# Patient Record
Sex: MALE | Race: BLACK OR AFRICAN AMERICAN | Employment: OTHER | URBAN - METROPOLITAN AREA
[De-identification: names, ages, dates, MRNs, and addresses within clinical notes are randomized per-mention and may not be internally consistent; named-entity substitution may affect disease eponyms.]

---

## 2017-01-03 ENCOUNTER — HOSPITAL ENCOUNTER (EMERGENCY)
Age: 67
Discharge: HOME | End: 2017-01-03
Attending: EMERGENCY MEDICINE
Payer: MEDICAID

## 2017-01-03 VITALS
WEIGHT: 136 LBS | RESPIRATION RATE: 16 BRPM | SYSTOLIC BLOOD PRESSURE: 129 MMHG | DIASTOLIC BLOOD PRESSURE: 59 MMHG | HEIGHT: 66 IN | OXYGEN SATURATION: 98 % | BODY MASS INDEX: 21.86 KG/M2 | HEART RATE: 95 BPM

## 2017-01-03 DIAGNOSIS — R31.9 HEMATURIA: ICD-10-CM

## 2017-01-03 DIAGNOSIS — S37.30XA URETHRAL INJURY, INITIAL ENCOUNTER: Primary | ICD-10-CM

## 2017-01-03 LAB
ALBUMIN SERPL-MCNC: 3.5 G/DL (ref 3.9–4.9)
ALP BLD-CCNC: 133 U/L (ref 35–104)
ALT SERPL-CCNC: 14 U/L (ref 0–41)
ANION GAP SERPL CALCULATED.3IONS-SCNC: 13 MEQ/L (ref 7–13)
AST SERPL-CCNC: 19 U/L (ref 0–40)
BACTERIA: ABNORMAL /HPF
BASOPHILS ABSOLUTE: 0 K/UL (ref 0–0.2)
BASOPHILS RELATIVE PERCENT: 0.3 %
BILIRUB SERPL-MCNC: 0.2 MG/DL (ref 0–1.2)
BILIRUBIN URINE: NEGATIVE
BLOOD, URINE: ABNORMAL
BUN BLDV-MCNC: 16 MG/DL (ref 8–23)
CALCIUM SERPL-MCNC: 9.4 MG/DL (ref 8.6–10.2)
CHLORIDE BLD-SCNC: 99 MEQ/L (ref 98–107)
CLARITY: ABNORMAL
CO2: 27 MEQ/L (ref 22–29)
COLOR: ABNORMAL
CREAT SERPL-MCNC: 0.4 MG/DL (ref 0.7–1.2)
EOSINOPHILS ABSOLUTE: 0.3 K/UL (ref 0–0.7)
EOSINOPHILS RELATIVE PERCENT: 2.3 %
EPITHELIAL CELLS, UA: ABNORMAL /HPF
GFR AFRICAN AMERICAN: >60
GFR NON-AFRICAN AMERICAN: >60
GLOBULIN: 3.4 G/DL (ref 2.3–3.5)
GLUCOSE BLD-MCNC: 224 MG/DL (ref 74–109)
GLUCOSE URINE: 100 MG/DL
HCT VFR BLD CALC: 38.1 % (ref 42–52)
HEMOGLOBIN: 12.6 G/DL (ref 14–18)
INR BLD: 0.9
KETONES, URINE: NEGATIVE MG/DL
LEUKOCYTE ESTERASE, URINE: ABNORMAL
LYMPHOCYTES ABSOLUTE: 1.9 K/UL (ref 1–4.8)
LYMPHOCYTES RELATIVE PERCENT: 13.2 %
MCH RBC QN AUTO: 28.9 PG (ref 27–31.3)
MCHC RBC AUTO-ENTMCNC: 33.1 % (ref 33–37)
MCV RBC AUTO: 87.2 FL (ref 80–100)
MONOCYTES ABSOLUTE: 1.3 K/UL (ref 0.2–0.8)
MONOCYTES RELATIVE PERCENT: 9.5 %
NEUTROPHILS ABSOLUTE: 10.6 K/UL (ref 1.4–6.5)
NEUTROPHILS RELATIVE PERCENT: 74.7 %
NITRITE, URINE: NEGATIVE
PDW BLD-RTO: 14.9 % (ref 11.5–14.5)
PH UA: 6 (ref 5–9)
PLATELET # BLD: 252 K/UL (ref 130–400)
POTASSIUM SERPL-SCNC: 4.1 MEQ/L (ref 3.5–5.1)
PROTEIN UA: >=300 MG/DL
PROTHROMBIN TIME: 9.9 SEC (ref 9.6–12.3)
RBC # BLD: 4.36 M/UL (ref 4.7–6.1)
RBC UA: >100 /HPF (ref 0–2)
SODIUM BLD-SCNC: 139 MEQ/L (ref 132–144)
SPECIFIC GRAVITY UA: 1.02 (ref 1–1.03)
TOTAL PROTEIN: 6.9 G/DL (ref 6.4–8.1)
URINE REFLEX TO CULTURE: YES
UROBILINOGEN, URINE: 0.2 E.U./DL
WBC # BLD: 14.1 K/UL (ref 4.8–10.8)
WBC UA: ABNORMAL /HPF (ref 0–5)

## 2017-01-03 PROCEDURE — 85025 COMPLETE CBC W/AUTO DIFF WBC: CPT

## 2017-01-03 PROCEDURE — 85610 PROTHROMBIN TIME: CPT

## 2017-01-03 PROCEDURE — 6370000000 HC RX 637 (ALT 250 FOR IP): Performed by: EMERGENCY MEDICINE

## 2017-01-03 PROCEDURE — 36415 COLL VENOUS BLD VENIPUNCTURE: CPT

## 2017-01-03 PROCEDURE — 2580000003 HC RX 258: Performed by: EMERGENCY MEDICINE

## 2017-01-03 PROCEDURE — 87086 URINE CULTURE/COLONY COUNT: CPT

## 2017-01-03 PROCEDURE — 81001 URINALYSIS AUTO W/SCOPE: CPT

## 2017-01-03 PROCEDURE — 80053 COMPREHEN METABOLIC PANEL: CPT

## 2017-01-03 PROCEDURE — 99283 EMERGENCY DEPT VISIT LOW MDM: CPT

## 2017-01-03 RX ORDER — 0.9 % SODIUM CHLORIDE 0.9 %
1000 INTRAVENOUS SOLUTION INTRAVENOUS ONCE
Status: COMPLETED | OUTPATIENT
Start: 2017-01-03 | End: 2017-01-03

## 2017-01-03 RX ORDER — CIPROFLOXACIN 500 MG/1
500 TABLET, FILM COATED ORAL 2 TIMES DAILY
Qty: 20 TABLET | Refills: 0 | Status: SHIPPED | OUTPATIENT
Start: 2017-01-03 | End: 2017-01-13

## 2017-01-03 RX ORDER — CIPROFLOXACIN 500 MG/1
500 TABLET, FILM COATED ORAL ONCE
Status: COMPLETED | OUTPATIENT
Start: 2017-01-03 | End: 2017-01-03

## 2017-01-03 RX ADMIN — SODIUM CHLORIDE 1000 ML: 9 INJECTION, SOLUTION INTRAVENOUS at 15:44

## 2017-01-03 RX ADMIN — CIPROFLOXACIN 500 MG: 500 TABLET, FILM COATED ORAL at 15:01

## 2017-01-03 ASSESSMENT — PAIN SCALES - GENERAL: PAINLEVEL_OUTOF10: 0

## 2017-01-03 ASSESSMENT — ENCOUNTER SYMPTOMS
ABDOMINAL PAIN: 0
CHOKING: 0
SINUS PRESSURE: 0
FACIAL SWELLING: 0
EYE DISCHARGE: 0
EYE REDNESS: 0
BLOOD IN STOOL: 0
COUGH: 0
STRIDOR: 0
TROUBLE SWALLOWING: 0
CONSTIPATION: 0
WHEEZING: 0
EYE PAIN: 0
DIARRHEA: 0
BACK PAIN: 0
VOMITING: 0
SHORTNESS OF BREATH: 0
CHEST TIGHTNESS: 0
VOICE CHANGE: 0
SORE THROAT: 0

## 2017-01-05 LAB — URINE CULTURE, ROUTINE: NORMAL

## 2017-03-20 ENCOUNTER — HOSPITAL ENCOUNTER (EMERGENCY)
Age: 67
Discharge: HOME | End: 2017-03-21
Attending: EMERGENCY MEDICINE
Payer: MEDICAID

## 2017-03-20 DIAGNOSIS — Q54.0 BALANIC HYPOSPADIAS: Primary | ICD-10-CM

## 2017-03-20 DIAGNOSIS — T83.9XXS FOLEY CATHETER PROBLEM, SEQUELA: ICD-10-CM

## 2017-03-20 PROCEDURE — 6370000000 HC RX 637 (ALT 250 FOR IP): Performed by: EMERGENCY MEDICINE

## 2017-03-20 PROCEDURE — 99284 EMERGENCY DEPT VISIT MOD MDM: CPT

## 2017-03-20 RX ADMIN — LIDOCAINE HYDROCHLORIDE: 20 JELLY TOPICAL at 21:37

## 2017-03-20 RX ADMIN — LIDOCAINE HYDROCHLORIDE: 20 JELLY TOPICAL at 22:30

## 2017-03-21 VITALS
HEART RATE: 68 BPM | DIASTOLIC BLOOD PRESSURE: 77 MMHG | OXYGEN SATURATION: 97 % | RESPIRATION RATE: 16 BRPM | SYSTOLIC BLOOD PRESSURE: 133 MMHG | TEMPERATURE: 97.7 F

## 2017-05-23 ENCOUNTER — HOSPITAL ENCOUNTER (EMERGENCY)
Age: 67
Discharge: HOME | End: 2017-05-24
Attending: EMERGENCY MEDICINE
Payer: MEDICAID

## 2017-05-23 VITALS — HEIGHT: 66 IN | WEIGHT: 135 LBS | BODY MASS INDEX: 21.69 KG/M2

## 2017-05-23 DIAGNOSIS — R33.9 URINARY RETENTION: Primary | ICD-10-CM

## 2017-05-23 DIAGNOSIS — Q54.9 HYPOSPADIAS, UNSPECIFIED HYPOSPADIAS TYPE: ICD-10-CM

## 2017-05-23 PROCEDURE — 51702 INSERT TEMP BLADDER CATH: CPT

## 2017-05-23 PROCEDURE — 99282 EMERGENCY DEPT VISIT SF MDM: CPT

## 2017-05-23 ASSESSMENT — PAIN SCALES - PAIN ASSESSMENT IN ADVANCED DEMENTIA (PAINAD)
CONSOLABILITY: 0
NEGVOCALIZATION: 0
BODYLANGUAGE: 0
TOTALSCORE: 0
BREATHING: 0
FACIALEXPRESSION: 0

## 2017-05-24 PROCEDURE — 6370000000 HC RX 637 (ALT 250 FOR IP): Performed by: EMERGENCY MEDICINE

## 2017-05-24 RX ADMIN — LIDOCAINE HYDROCHLORIDE: 20 JELLY TOPICAL at 00:23

## 2017-07-13 ENCOUNTER — HOSPITAL ENCOUNTER (EMERGENCY)
Age: 67
Discharge: HOME | End: 2017-07-13
Attending: EMERGENCY MEDICINE
Payer: MEDICAID

## 2017-07-13 VITALS
SYSTOLIC BLOOD PRESSURE: 139 MMHG | TEMPERATURE: 98.5 F | OXYGEN SATURATION: 97 % | BODY MASS INDEX: 24.91 KG/M2 | DIASTOLIC BLOOD PRESSURE: 85 MMHG | HEIGHT: 66 IN | HEART RATE: 78 BPM | RESPIRATION RATE: 16 BRPM | WEIGHT: 155 LBS

## 2017-07-13 DIAGNOSIS — T83.9XXD URINARY CATHETER COMPLICATION, SUBSEQUENT ENCOUNTER: Primary | ICD-10-CM

## 2017-07-13 DIAGNOSIS — Q54.0 HYPOSPADIAS, BALANIC: ICD-10-CM

## 2017-07-13 DIAGNOSIS — R33.9 URINARY RETENTION: ICD-10-CM

## 2017-07-13 PROCEDURE — 99282 EMERGENCY DEPT VISIT SF MDM: CPT

## 2017-07-13 RX ORDER — ATORVASTATIN CALCIUM 10 MG/1
10 TABLET, FILM COATED ORAL DAILY
COMMUNITY
End: 2017-09-05 | Stop reason: SDUPTHER

## 2017-07-13 RX ORDER — BUPROPION HYDROCHLORIDE 150 MG/1
150 TABLET ORAL EVERY MORNING
COMMUNITY
End: 2017-09-05 | Stop reason: SDUPTHER

## 2017-07-13 ASSESSMENT — ENCOUNTER SYMPTOMS
ABDOMINAL PAIN: 0
EYE DISCHARGE: 0
CONSTIPATION: 0
FACIAL SWELLING: 0
SINUS PRESSURE: 0
VOICE CHANGE: 0
PHOTOPHOBIA: 0
WHEEZING: 0
EYE REDNESS: 0
STRIDOR: 0
COUGH: 0
SHORTNESS OF BREATH: 0
NAUSEA: 0
EYE PAIN: 0
APNEA: 0
EYE ITCHING: 0
CHEST TIGHTNESS: 0
SORE THROAT: 0
COLOR CHANGE: 0
RECTAL PAIN: 0
ABDOMINAL DISTENTION: 0
BACK PAIN: 0
ANAL BLEEDING: 0
CHOKING: 0
BLOOD IN STOOL: 0
RHINORRHEA: 0
VOMITING: 0
TROUBLE SWALLOWING: 0
DIARRHEA: 0

## 2017-09-04 ENCOUNTER — OFFICE VISIT (OUTPATIENT)
Dept: GERIATRIC MEDICINE | Age: 67
End: 2017-09-04

## 2017-09-04 DIAGNOSIS — E11.8 DM TYPE 2, CONTROLLED, WITH COMPLICATION (HCC): ICD-10-CM

## 2017-09-04 DIAGNOSIS — G40.909 SEIZURE DISORDER (HCC): ICD-10-CM

## 2017-09-04 DIAGNOSIS — I10 ESSENTIAL HYPERTENSION: ICD-10-CM

## 2017-09-04 DIAGNOSIS — F79 MR (MENTAL RETARDATION): Primary | ICD-10-CM

## 2017-09-04 PROCEDURE — 99309 SBSQ NF CARE MODERATE MDM 30: CPT | Performed by: NURSE PRACTITIONER

## 2017-09-05 VITALS
SYSTOLIC BLOOD PRESSURE: 125 MMHG | RESPIRATION RATE: 16 BRPM | OXYGEN SATURATION: 96 % | DIASTOLIC BLOOD PRESSURE: 73 MMHG | WEIGHT: 142 LBS | TEMPERATURE: 97.3 F | HEART RATE: 81 BPM | BODY MASS INDEX: 22.92 KG/M2

## 2017-09-05 RX ORDER — BUPROPION HYDROCHLORIDE 150 MG/1
150 TABLET ORAL EVERY MORNING
COMMUNITY
End: 2019-08-28

## 2017-09-05 RX ORDER — PROMETHAZINE HYDROCHLORIDE 25 MG/1
25 SUPPOSITORY RECTAL EVERY 6 HOURS PRN
COMMUNITY

## 2017-09-05 RX ORDER — CLONIDINE HYDROCHLORIDE 0.1 MG/1
0.1 TABLET ORAL 3 TIMES DAILY
COMMUNITY

## 2017-09-05 RX ORDER — ACETAMINOPHEN 325 MG/1
325 TABLET ORAL DAILY
COMMUNITY

## 2017-09-05 RX ORDER — LORATADINE 10 MG/1
10 TABLET ORAL DAILY
COMMUNITY
End: 2020-05-27 | Stop reason: SDUPTHER

## 2017-09-05 RX ORDER — ACETAMINOPHEN 160 MG
TABLET,DISINTEGRATING ORAL
COMMUNITY

## 2017-09-05 RX ORDER — FERROUS SULFATE 325(65) MG
325 TABLET ORAL 2 TIMES DAILY
COMMUNITY

## 2017-09-05 RX ORDER — BUSPIRONE HYDROCHLORIDE 15 MG/1
15 TABLET ORAL 3 TIMES DAILY
COMMUNITY

## 2017-09-05 RX ORDER — CYANOCOBALAMIN 1000 UG/ML
1000 INJECTION, SOLUTION INTRAMUSCULAR; SUBCUTANEOUS SEE ADMIN INSTRUCTIONS
COMMUNITY
End: 2019-08-28

## 2017-09-05 RX ORDER — ONDANSETRON 4 MG/1
4 TABLET, FILM COATED ORAL EVERY 6 HOURS PRN
COMMUNITY

## 2017-09-05 RX ORDER — ACETAMINOPHEN 325 MG/1
650 TABLET ORAL EVERY 4 HOURS PRN
COMMUNITY
End: 2019-08-28

## 2017-09-05 RX ORDER — PYRIDOXINE HCL (VITAMIN B6) 100 MG
500 TABLET ORAL 2 TIMES DAILY
COMMUNITY

## 2017-09-05 RX ORDER — LISINOPRIL 40 MG/1
40 TABLET ORAL 2 TIMES DAILY
COMMUNITY

## 2017-09-05 RX ORDER — PANTOPRAZOLE SODIUM 40 MG/1
40 TABLET, DELAYED RELEASE ORAL DAILY
COMMUNITY

## 2017-09-05 RX ORDER — AMLODIPINE BESYLATE 5 MG/1
5 TABLET ORAL DAILY
COMMUNITY

## 2017-09-05 RX ORDER — CALCIUM CARBONATE 500(1250)
500 TABLET ORAL 2 TIMES DAILY
COMMUNITY

## 2017-09-05 RX ORDER — SERTRALINE HYDROCHLORIDE 100 MG/1
100 TABLET, FILM COATED ORAL DAILY
COMMUNITY
End: 2020-11-15

## 2017-09-05 RX ORDER — ATORVASTATIN CALCIUM 10 MG/1
10 TABLET, FILM COATED ORAL DAILY
COMMUNITY

## 2017-09-05 RX ORDER — LEVETIRACETAM 750 MG/1
750 TABLET ORAL 2 TIMES DAILY
COMMUNITY

## 2017-09-05 RX ORDER — LANOLIN ALCOHOL/MO/W.PET/CERES
25 CREAM (GRAM) TOPICAL DAILY
COMMUNITY

## 2017-09-05 RX ORDER — PIOGLITAZONEHYDROCHLORIDE 30 MG/1
45 TABLET ORAL DAILY
COMMUNITY

## 2017-10-03 LAB
AVERAGE GLUCOSE: 169
HBA1C MFR BLD: 7.5 %

## 2017-10-11 DIAGNOSIS — Z79.899 ENCOUNTER FOR LONG-TERM (CURRENT) USE OF OTHER MEDICATIONS: ICD-10-CM

## 2017-10-11 PROCEDURE — 77080 DXA BONE DENSITY AXIAL: CPT

## 2017-11-21 ENCOUNTER — OFFICE VISIT (OUTPATIENT)
Dept: GERIATRIC MEDICINE | Age: 67
End: 2017-11-21

## 2017-11-21 VITALS
OXYGEN SATURATION: 94 % | DIASTOLIC BLOOD PRESSURE: 80 MMHG | HEART RATE: 84 BPM | SYSTOLIC BLOOD PRESSURE: 119 MMHG | RESPIRATION RATE: 16 BRPM | TEMPERATURE: 97.7 F

## 2017-11-21 DIAGNOSIS — E55.9 HYPOVITAMINOSIS D: ICD-10-CM

## 2017-11-21 DIAGNOSIS — N18.1 DIABETES MELLITUS DUE TO UNDERLYING CONDITION WITH STAGE 1 CHRONIC KIDNEY DISEASE, WITHOUT LONG-TERM CURRENT USE OF INSULIN (HCC): ICD-10-CM

## 2017-11-21 DIAGNOSIS — F79 MR (MENTAL RETARDATION): Primary | ICD-10-CM

## 2017-11-21 DIAGNOSIS — E08.22 DIABETES MELLITUS DUE TO UNDERLYING CONDITION WITH STAGE 1 CHRONIC KIDNEY DISEASE, WITHOUT LONG-TERM CURRENT USE OF INSULIN (HCC): ICD-10-CM

## 2017-11-21 DIAGNOSIS — Z97.8 CHRONIC INDWELLING FOLEY CATHETER: ICD-10-CM

## 2017-11-21 PROCEDURE — 99307 SBSQ NF CARE SF MDM 10: CPT | Performed by: NURSE PRACTITIONER

## 2017-12-03 NOTE — PROGRESS NOTES
does   self propel,  Constitutional: There are no reports of screaming out loud psychotic behavioral issues, can be uncooperative at times, no change in appetite, fever, has general weakness at baseline. No bleeding issues. Respiratory: nurse notes no  SOB, dyspnea, dyspnea on exertion  Cardiovascular: nursereports no  CP, PND, orthopnea. GI: Eats fairly well with no gross wt loss, Is incontinent of bowel  No N/V/D. : gayle, has chronic UTIs, last one ~ one year ago   Extremities: non ambulatory, No reports of pain issues, has trace edema at baseline    Physical exam:   /80   Pulse 84   Temp 97.7 °F (36.5 °C)   Resp 16   SpO2 94%   Constitutional: Awake and alert sitting up in wheel chair, slightly uncooperative with exam,  non verbal  mumbles at exam   Cardiovascular: Regular rate  -c/r  Respiratory: LCTA  GI: abdomen NT ND  : no suprapubic tenderness  Extremities: tr  edema, DP pulses 2+/4    ASSESSMENT:    1. MR (mental retardation)     2. Chronic indwelling Gayle catheter     3. Diabetes mellitus due to underlying condition with stage 1 chronic kidney disease, without long-term current use of insulin ([facility] [address]     4. Hypovitaminosis D         PLAN:   1. senior care care  2. No recent UTIs  3. A1c 7.5%, could be better for age  3. Supplement     Return in about 2 months (around 1/21/2018). Pertinent POC, labs, have been reviewed, continue same. Encourage fluids and good nutrition. Fall prevention strategies in place.     Nilesh Fall, DNP, MSN, RN, GNP-BC, NP-C  Adult Nurse Practitioner  [address], [facility]  Department of Geriatrics  8:30am-4:30pm   [phone]  After hours Answering service [phone]

## 2018-01-02 ENCOUNTER — HOSPITAL ENCOUNTER (EMERGENCY)
Age: 68
Discharge: HOME | End: 2018-01-03
Attending: EMERGENCY MEDICINE
Payer: MEDICAID

## 2018-01-02 DIAGNOSIS — Q54.1 PENILE HYPOSPADIAS: ICD-10-CM

## 2018-01-02 DIAGNOSIS — R33.9 URINARY RETENTION: Primary | ICD-10-CM

## 2018-01-02 PROCEDURE — 87186 SC STD MICRODIL/AGAR DIL: CPT

## 2018-01-02 PROCEDURE — 51702 INSERT TEMP BLADDER CATH: CPT

## 2018-01-02 PROCEDURE — 87077 CULTURE AEROBIC IDENTIFY: CPT

## 2018-01-02 PROCEDURE — 87086 URINE CULTURE/COLONY COUNT: CPT

## 2018-01-02 PROCEDURE — 99284 EMERGENCY DEPT VISIT MOD MDM: CPT

## 2018-01-03 VITALS
HEART RATE: 78 BPM | SYSTOLIC BLOOD PRESSURE: 166 MMHG | DIASTOLIC BLOOD PRESSURE: 92 MMHG | RESPIRATION RATE: 18 BRPM | OXYGEN SATURATION: 98 % | TEMPERATURE: 97.2 F

## 2018-01-03 NOTE — ED NOTES
[phone] report given to nurse, aware patient returning.      Morgan Shin RN  01/02/18 0460

## 2018-01-03 NOTE — ED PROVIDER NOTES
[facility] ED  eMERGENCY dEPARTMENT eNCOUnter      Pt Name: James Negrete  MRN: [id number]  [place] 1950  Date of evaluation: 1/2/2018  Provider: Milan Mac MD    [address] [place] [address]       Chief Complaint   Patient presents with    Urinary Catheter Problem         HISTORY OF PRESENT ILLNESS   (Location/Symptom, Timing/Onset, Context/Setting, Quality, Duration, Modifying Factors, Severity)  Note limiting factors. James Negrete is a 74 y.o. male who presents to the emergency department For Ha catheter replacement. Patient has a chronic indwelling Ha catheter for an extended period of time. 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 Ha catheter could not be replaced after it became displaced at the nursing facility. No indication of fever or abdominal pain. The history is provided by the nursing home. Nursing Notes were reviewed. REVIEW OF SYSTEMS    (2-9 systems for level 4, 10 or more for level 5)     Review of Systems   Unable to perform ROS: Patient nonverbal       Except as noted above the remainder of the review of systems was reviewed and negative.        PAST MEDICAL HISTORY     Past Medical History:   Diagnosis Date    Anemia     Chronic indwelling Ha catheter     Chronic UTI     Chronic UTI     Dermatitis     DM (diabetes mellitus) (HCC)     Eczema     Gastric polyp     GERD (gastroesophageal reflux disease)     H/O bone density study 9.2.11    lumbar spine, lt femoral neck osteopenia, rt femoral neck normal    High blood pressure     HTN (hypertension)     Hypokalemia     Hypovitaminosis D     VANIA (iron deficiency anemia)     MR (mental retardation)     Osteopenia     Seizure disorder ([address]     Wound of back          SURGICAL HISTORY       Past Surgical History:   Procedure Laterality Date    COLONOSCOPY  8.19.11    negative    UPPER GASTROINTESTINAL ENDOSCOPY  8.19.11    by Dr. Nicolette Bence MEDICATIONS       Previous Medications    ACETAMINOPHEN (TYLENOL) 325 MG TABLET    Take 325 mg by mouth daily    ACETAMINOPHEN (TYLENOL) 325 MG TABLET    Take 650 mg by mouth every 4 hours as needed for Pain    AMLODIPINE (NORVASC) 5 MG TABLET    Take 5 mg by mouth daily    ATORVASTATIN (LIPITOR) 10 MG TABLET    Take 10 mg by mouth daily    BUPROPION (WELLBUTRIN XL) 150 MG EXTENDED RELEASE TABLET    Take 150 mg by mouth every morning    BUSPIRONE (BUSPAR) 15 MG TABLET    Take 15 mg by mouth 3 times daily    CALCIUM CARBONATE (OSCAL) 500 MG TABS TABLET    Take 500 mg by mouth 2 times daily    CHOLECALCIFEROL (VITAMIN D3) 2000 UNITS CAPS    Take by mouth Daily with lunch    CLONIDINE (CATAPRES) 0.1 MG TABLET    Take 0.1 mg by mouth 3 times daily    CRANBERRY 500 MG CAPS    Take 500 mg by mouth 2 times daily    CYANOCOBALAMIN 1000 MCG/ML INJECTION    Inject 1,000 mcg into the muscle See Admin Instructions THE 7TH AND 21ST OF EVERY MO    FERROUS SULFATE 325 (65 FE) MG TABLET    Take 325 mg by mouth 2 times daily    LEVETIRACETAM (KEPPRA) 750 MG TABLET    Take 750 mg by mouth 2 times daily    LISINOPRIL (PRINIVIL;ZESTRIL) 40 MG TABLET    Take 40 mg by mouth 2 times daily    LORATADINE (CLARITIN) 10 MG TABLET    Take 10 mg by mouth daily    METFORMIN (GLUCOPHAGE) 500 MG TABLET    Take 750 mg by mouth 2 times daily (with meals)    MULTIPLE VITAMIN (MULTIVITAMINS PO)    Take by mouth daily    ONDANSETRON (ZOFRAN) 4 MG TABLET    Take 4 mg by mouth every 6 hours as needed for Nausea or Vomiting    PANTOPRAZOLE (PROTONIX) 40 MG TABLET    Take 40 mg by mouth daily    PIOGLITAZONE (ACTOS) 30 MG TABLET    Take 30 mg by mouth daily    PROBIOTIC CAPS    Take by mouth 2 times daily    PROMETHAZINE (PHENERGAN) 25 MG SUPPOSITORY    Place 25 mg rectally every 6 hours as needed for Nausea    PYRIDOXINE HCL (VITAMIN B-6) 50 MG TABLET    Take 25 mg by mouth daily    SERTRALINE (ZOLOFT) 100 MG TABLET    Take 100 mg by mouth daily ALLERGIES     Review of patient's allergies indicates no known allergies. FAMILY HISTORY     History reviewed. No pertinent family history. SOCIAL HISTORY       Social History     Social History    Marital status: Single     Spouse name: N/A    Number of children: N/A    Years of education: N/A     Social History Main Topics    Smoking status: Never Smoker    Smokeless tobacco: Never Used    Alcohol use No    Drug use: No    Sexual activity: No     Other Topics Concern    None     Social History Narrative    None       SCREENINGS             PHYSICAL EXAM    (up to 7 for level 4, 8 or more for level 5)     ED Triage Vitals [01/02/18 2214]   BP Temp Temp Source Pulse Resp SpO2 Height Weight   (!) 164/93 97.2 °F (36.2 °C) Tympanic 74 18 98 % -- --       Physical Exam  This is a 29-year-old male without distress. No abdominal tenderness or obvious bladder distention. No acute skin rash. Patient is awake but nonverbal.  No gross area of skin rash. Genital exam shows there is significant erosion of the urethral meatus from chronic Ha catheter trauma. DIAGNOSTIC RESULTS     EKG: All EKG's are interpreted by the Emergency Department Physician who either signs or Co-signs this chart in the absence of a cardiologist.        RADIOLOGY:   Non-plain film images such as CT, Ultrasound and MRI are read by the radiologist. Plain radiographic images are visualized and preliminarily interpreted by the emergency physician with the below findings:        Interpretation per the Radiologist below, if available at the time of this note:    No orders to display         ED BEDSIDE ULTRASOUND:   Performed by ED Physician - none    LABS:  Labs Reviewed   URINE CULTURE       All other labs were within normal range or not returned as of this dictation.     EMERGENCY DEPARTMENT COURSE and DIFFERENTIAL DIAGNOSIS/MDM:   Vitals:    Vitals:    01/02/18 2214   BP: (!) 164/93   Pulse: 74   Resp: 18   Temp: 97.2 °F (36.2 °C)   TempSrc: Tympanic   SpO2: 98%       The Ha catheter was placed by nursing staff significant difficulty. Coudé catheter was utilized. Didn't appear clinically that he has a false lumen present that was causing this difficulty. In light of the significant traumatic hypospadias and difficulty with catheterization recommended that he have evaluation by urologist.  Urine culture is sent and pending. MDM    CRITICAL CARE TIME   Total Critical Care time was  minutes, excluding separately reportable procedures. There was a high probability of clinically significant/life threatening deterioration in the patient's condition which required my urgent intervention. CONSULTS:  None    PROCEDURES:  Unless otherwise noted below, none     Procedures    FINAL IMPRESSION      1. Urinary retention    2.  Penile hypospadias          DISPOSITION/PLAN   DISPOSITION Decision To Discharge 01/02/2018 11:15:58 PM      PATIENT REFERRED TO:  Jarocho Kwok MD  [address]  [phone]    Schedule an appointment as soon as possible for a visit         DISCHARGE MEDICATIONS:  New Prescriptions    No medications on file          (Please note that portions of this note were completed with a voice recognition program.  Efforts were made to edit the dictations but occasionally words are mis-transcribed.)    Loreto Carrillo MD (electronically signed)  Attending Emergency Physician          Carlotta Beltran MD  01/02/18 0390

## 2018-01-03 NOTE — ED NOTES
Catheter balloon inflated with 15ml of saline- per package insert okay to do, Dr. Devan Christensen aware and okay with this due to patients current penile anatomy state.       Janel Soria RN  01/02/18 5653

## 2018-01-05 LAB
ORGANISM: ABNORMAL
URINE CULTURE, ROUTINE: ABNORMAL
URINE CULTURE, ROUTINE: ABNORMAL

## 2018-01-23 ENCOUNTER — HOSPITAL ENCOUNTER (OUTPATIENT)
Dept: PHYSICAL THERAPY | Age: 68
Setting detail: THERAPIES SERIES
Discharge: HOME | End: 2018-01-23
Payer: MEDICAID

## 2018-01-23 ENCOUNTER — HOSPITAL ENCOUNTER (OUTPATIENT)
Dept: SPEECH THERAPY | Age: 68
Setting detail: THERAPIES SERIES
Discharge: HOME | End: 2018-01-23
Payer: MEDICAID

## 2018-01-23 PROCEDURE — G8990 OTHER PT/OT CURRENT STATUS: HCPCS

## 2018-01-23 PROCEDURE — G8996 SWALLOW CURRENT STATUS: HCPCS

## 2018-01-23 PROCEDURE — G8991 OTHER PT/OT GOAL STATUS: HCPCS

## 2018-01-23 PROCEDURE — G8992 OTHER PT/OT  D/C STATUS: HCPCS

## 2018-01-23 PROCEDURE — G8997 SWALLOW GOAL STATUS: HCPCS

## 2018-01-23 PROCEDURE — 97163 PT EVAL HIGH COMPLEX 45 MIN: CPT

## 2018-01-23 PROCEDURE — 97110 THERAPEUTIC EXERCISES: CPT

## 2018-01-23 PROCEDURE — 92610 EVALUATE SWALLOWING FUNCTION: CPT

## 2018-01-23 PROCEDURE — G8998 SWALLOW D/C STATUS: HCPCS

## 2018-02-09 ENCOUNTER — OFFICE VISIT (OUTPATIENT)
Dept: GERIATRIC MEDICINE | Age: 68
End: 2018-02-09
Payer: MEDICAID

## 2018-02-09 DIAGNOSIS — I10 ESSENTIAL HYPERTENSION: ICD-10-CM

## 2018-02-09 DIAGNOSIS — E08.22 DIABETES MELLITUS DUE TO UNDERLYING CONDITION WITH STAGE 1 CHRONIC KIDNEY DISEASE, WITHOUT LONG-TERM CURRENT USE OF INSULIN (HCC): Primary | ICD-10-CM

## 2018-02-09 DIAGNOSIS — F79 MR (MENTAL RETARDATION): ICD-10-CM

## 2018-02-09 DIAGNOSIS — E55.9 HYPOVITAMINOSIS D: ICD-10-CM

## 2018-02-09 DIAGNOSIS — N18.1 DIABETES MELLITUS DUE TO UNDERLYING CONDITION WITH STAGE 1 CHRONIC KIDNEY DISEASE, WITHOUT LONG-TERM CURRENT USE OF INSULIN (HCC): Primary | ICD-10-CM

## 2018-02-09 PROCEDURE — 99308 SBSQ NF CARE LOW MDM 20: CPT | Performed by: NURSE PRACTITIONER

## 2018-02-13 VITALS
DIASTOLIC BLOOD PRESSURE: 79 MMHG | RESPIRATION RATE: 20 BRPM | WEIGHT: 134 LBS | HEART RATE: 95 BPM | SYSTOLIC BLOOD PRESSURE: 134 MMHG | TEMPERATURE: 97 F | BODY MASS INDEX: 21.63 KG/M2

## 2018-04-13 ENCOUNTER — OFFICE VISIT (OUTPATIENT)
Dept: GERIATRIC MEDICINE | Age: 68
End: 2018-04-13
Payer: MEDICAID

## 2018-04-13 DIAGNOSIS — I10 ESSENTIAL HYPERTENSION: ICD-10-CM

## 2018-04-13 DIAGNOSIS — E08.22 DIABETES MELLITUS DUE TO UNDERLYING CONDITION WITH STAGE 1 CHRONIC KIDNEY DISEASE, WITHOUT LONG-TERM CURRENT USE OF INSULIN (HCC): ICD-10-CM

## 2018-04-13 DIAGNOSIS — F79 MR (MENTAL RETARDATION): Primary | ICD-10-CM

## 2018-04-13 DIAGNOSIS — N18.1 DIABETES MELLITUS DUE TO UNDERLYING CONDITION WITH STAGE 1 CHRONIC KIDNEY DISEASE, WITHOUT LONG-TERM CURRENT USE OF INSULIN (HCC): ICD-10-CM

## 2018-04-13 DIAGNOSIS — Z97.8 CHRONIC INDWELLING FOLEY CATHETER: ICD-10-CM

## 2018-04-13 PROCEDURE — 99308 SBSQ NF CARE LOW MDM 20: CPT | Performed by: NURSE PRACTITIONER

## 2018-04-17 VITALS
WEIGHT: 133.2 LBS | OXYGEN SATURATION: 96 % | TEMPERATURE: 97.6 F | HEART RATE: 83 BPM | BODY MASS INDEX: 21.5 KG/M2 | SYSTOLIC BLOOD PRESSURE: 118 MMHG | RESPIRATION RATE: 20 BRPM | DIASTOLIC BLOOD PRESSURE: 77 MMHG

## 2018-06-21 ENCOUNTER — OFFICE VISIT (OUTPATIENT)
Dept: GERIATRIC MEDICINE | Age: 68
End: 2018-06-21
Payer: MEDICAID

## 2018-06-21 DIAGNOSIS — E08.22 DIABETES MELLITUS DUE TO UNDERLYING CONDITION WITH STAGE 1 CHRONIC KIDNEY DISEASE, WITHOUT LONG-TERM CURRENT USE OF INSULIN (HCC): ICD-10-CM

## 2018-06-21 DIAGNOSIS — N18.1 DIABETES MELLITUS DUE TO UNDERLYING CONDITION WITH STAGE 1 CHRONIC KIDNEY DISEASE, WITHOUT LONG-TERM CURRENT USE OF INSULIN (HCC): ICD-10-CM

## 2018-06-21 DIAGNOSIS — F79 DISORDER OF INTELLECTUAL DEVELOPMENT: Primary | ICD-10-CM

## 2018-06-21 DIAGNOSIS — M85.80 OSTEOPENIA, UNSPECIFIED LOCATION: ICD-10-CM

## 2018-06-21 DIAGNOSIS — I10 ESSENTIAL HYPERTENSION: ICD-10-CM

## 2018-06-21 PROCEDURE — 99308 SBSQ NF CARE LOW MDM 20: CPT | Performed by: NURSE PRACTITIONER

## 2018-07-03 ENCOUNTER — OFFICE VISIT (OUTPATIENT)
Dept: GERIATRIC MEDICINE | Age: 68
End: 2018-07-03
Payer: MEDICAID

## 2018-07-03 DIAGNOSIS — M85.80 OSTEOPENIA, UNSPECIFIED LOCATION: ICD-10-CM

## 2018-07-03 DIAGNOSIS — I10 ESSENTIAL HYPERTENSION: ICD-10-CM

## 2018-07-03 DIAGNOSIS — F79 DISORDER OF INTELLECTUAL DEVELOPMENT: Primary | ICD-10-CM

## 2018-07-03 DIAGNOSIS — G40.909 SEIZURE DISORDER (HCC): ICD-10-CM

## 2018-07-03 DIAGNOSIS — K21.9 GASTROESOPHAGEAL REFLUX DISEASE WITHOUT ESOPHAGITIS: ICD-10-CM

## 2018-07-03 DIAGNOSIS — N18.1 DIABETES MELLITUS DUE TO UNDERLYING CONDITION WITH STAGE 1 CHRONIC KIDNEY DISEASE, WITHOUT LONG-TERM CURRENT USE OF INSULIN (HCC): ICD-10-CM

## 2018-07-03 DIAGNOSIS — E08.22 DIABETES MELLITUS DUE TO UNDERLYING CONDITION WITH STAGE 1 CHRONIC KIDNEY DISEASE, WITHOUT LONG-TERM CURRENT USE OF INSULIN (HCC): ICD-10-CM

## 2018-07-03 PROCEDURE — 99310 SBSQ NF CARE HIGH MDM 45: CPT | Performed by: NURSE PRACTITIONER

## 2018-07-13 VITALS
HEART RATE: 87 BPM | TEMPERATURE: 98.4 F | RESPIRATION RATE: 20 BRPM | HEIGHT: 66 IN | WEIGHT: 132 LBS | OXYGEN SATURATION: 95 % | SYSTOLIC BLOOD PRESSURE: 115 MMHG | BODY MASS INDEX: 21.21 KG/M2 | DIASTOLIC BLOOD PRESSURE: 76 MMHG

## 2018-07-14 NOTE — PROGRESS NOTES
use: No    Sexual activity: No     Other Topics Concern    Not on file     Social History Narrative    No narrative on file       Allergies: Patient has no known allergies.   Current Outpatient Prescriptions on File Prior to Visit   Medication Sig Dispense Refill    amLODIPine (NORVASC) 5 MG tablet Take 5 mg by mouth daily      acetaminophen (TYLENOL) 325 MG tablet Take 325 mg by mouth daily      atorvastatin (LIPITOR) 10 MG tablet Take 10 mg by mouth daily      buPROPion (WELLBUTRIN XL) 150 MG extended release tablet Take 150 mg by mouth every morning      busPIRone (BUSPAR) 15 MG tablet Take 15 mg by mouth 3 times daily      cloNIDine (CATAPRES) 0.1 MG tablet Take 0.1 mg by mouth 3 times daily      Cranberry 500 MG CAPS Take 500 mg by mouth 2 times daily      cyanocobalamin 1000 MCG/ML injection Inject 1,000 mcg into the muscle See Admin Instructions THE 7TH AND 21ST OF EVERY MO      ferrous sulfate 325 (65 Fe) MG tablet Take 325 mg by mouth 2 times daily      levETIRAcetam (KEPPRA) 750 MG tablet Take 750 mg by mouth 2 times daily      lisinopril (PRINIVIL;ZESTRIL) 40 MG tablet Take 40 mg by mouth 2 times daily      loratadine (CLARITIN) 10 MG tablet Take 10 mg by mouth daily      metFORMIN (GLUCOPHAGE) 500 MG tablet Take 750 mg by mouth 2 times daily (with meals)      Multiple Vitamin (MULTIVITAMINS PO) Take by mouth daily      calcium carbonate (OSCAL) 500 MG TABS tablet Take 500 mg by mouth 2 times daily      pantoprazole (PROTONIX) 40 MG tablet Take 40 mg by mouth daily      pioglitazone (ACTOS) 30 MG tablet Take 30 mg by mouth daily      sertraline (ZOLOFT) 100 MG tablet Take 100 mg by mouth daily      Pyridoxine HCl (VITAMIN B-6) 50 MG tablet Take 25 mg by mouth daily      Cholecalciferol (VITAMIN D3) 2000 units CAPS Take by mouth Daily with lunch      acetaminophen (TYLENOL) 325 MG tablet Take 650 mg by mouth every 4 hours as needed for Pain      ondansetron (ZOFRAN) 4 MG tablet Take 4 mg by mouth every 6 hours as needed for Nausea or Vomiting      Probiotic CAPS Take by mouth 2 times daily      promethazine (PHENERGAN) 25 MG suppository Place 25 mg rectally every 6 hours as needed for Nausea       No current facility-administered medications on file prior to visit. NF MEDICATIONS REVIEWED/Labs reviewed    Review of Systems    ROS:  Does not speak much but can say no clearly,  able to  Communicate with staff,   Can  make needs known. Non ambulatory uses modified wheel chair,   does not  self propel. Constitutional:  no change in appetite, no gross wt loss, no fever, has general weakness at baseline. No bleeding issues. FUNCTIONAL LIMITATIONS: Prosthetic devices  none  Implants none  HEENT:  Vision good     Glasses  none Teeth own Dentures none  Hearing intact  Respiratory: nurse notes no  SOB, dyspnea, dyspnea on exertion  Cardiovascular: nurse reports no  CP, PND, orthopnea. GI: Eats  fairly well with Modified diet . Is incontinent of  bowel    No N/V/D.  + constipation       No   Hernia   : neurogenic bladder,  Ha for drainage is long term   MS/Extremities: Scoliosis  None     Kyphosis none   non ambulatory,   Chronic pain issues none, Chronic edema in pedal, and danle at times,  Chronic rash none    Wound  none    Objective:   /76   Pulse 87   Temp 98.4 °F (36.9 °C)   Resp 20   Ht 5' 6\" (1.676 m)   Wt 132 lb (59.9 kg)   SpO2 95%   BMI 21.31 kg/m²     Physical Exam    PE:  General Appearance: sitting up in [facility] with lap belt. Good grooming, No drooling  HEENT: atraumatic,  + temporal and thenar wasting,  Teeth intact, ears wnl, PEARLA, EOMI  Chest: wnl  Lungs: CTA  Breast: wnl  Cardiac: S1s2 wnl  -m/c/r  GI: +BS NT ND no hepatomegaly  : no suprapubic tnderness  Back: wnl  Extremities:  B/l pedal 2+ edema, Right shoe lift  L knee inversion 2/2 OA and remodeling. Neurological: grossly intact,  José Luis x 4    Assessment:       Diagnosis Orders   1.  Disorder of

## 2018-07-15 VITALS
RESPIRATION RATE: 18 BRPM | HEART RATE: 80 BPM | SYSTOLIC BLOOD PRESSURE: 120 MMHG | BODY MASS INDEX: 21.31 KG/M2 | DIASTOLIC BLOOD PRESSURE: 72 MMHG | OXYGEN SATURATION: 95 % | WEIGHT: 132 LBS | TEMPERATURE: 97 F

## 2018-07-15 NOTE — PROGRESS NOTES
PATIENTArbie Class : 1950 DOS: 2018     [facility]    The patient is being seen for followup of chronic illnesses of IDD, osteopenia, hypertension, diabetes. The patient lives in [address] [place] [address] for [facility] care. He does not speak clearly, but he can say no. He can also make his needs known and he appears to understand and follow simple commands. No screaming out psychotic behaviors. He can be uncooperative at times. There have been no fevers, no bleeding issues. No known chest pain, orthopnea. No gross weight loss. He does have a Ha with chronic UTIs, generally nonambulatory. No reports of pain issues. MEDICATIONS/ALLERGIES: Reviewed in the nursing facility chart. SOCIAL HISTORY AND FAMILY MEDICAL HISTORY: See Epic H&P.     PMH: see EPIC note     PHYSICAL EXAMINATION: Vital Signs: 97, 80, 18, 120/72, 95% O2 saturation on room air. Weight is 132 pounds. The patient is sitting up in the wheelchair. His seat belt is in place. Ha catheter noted to drain yellow urine with some white sedimentation. He is awake, alert, good tracking with his eyes. He is following simple commands, slightly hypervigilant. He is thin. He is frail in appearance. Heart: RRR. Lungs are clear to auscultation. Positive bowel sounds. No hepatomegaly. Lower extremities: Left has some edema at baseline. He is slightly local. He is nodding his head yes and no appropriately. ASSESSMENT AND PLAN:   1.  IDD. 2.  DM2.   3.  Chronic kidney disease I without insulin. 4.  Osteopenia, tolerating meds. 5.  Hypertension: Blood pressure is under fairly good control. POC, labs reviewed. We will follow up in 2 months.           Electronically Signed By: KARLOS Pino GNP-BC on 2018 13:44:53  ______________________________  KARLOS Pino GNP-BC  /[id number]  D: 2018 17:03:33  T: 2018 08:58:35

## 2018-08-07 ENCOUNTER — HOSPITAL ENCOUNTER (EMERGENCY)
Age: 68
Discharge: HOME | End: 2018-08-07
Attending: EMERGENCY MEDICINE
Payer: MEDICAID

## 2018-08-07 VITALS
DIASTOLIC BLOOD PRESSURE: 58 MMHG | OXYGEN SATURATION: 98 % | HEIGHT: 66 IN | RESPIRATION RATE: 18 BRPM | HEART RATE: 97 BPM | SYSTOLIC BLOOD PRESSURE: 113 MMHG | TEMPERATURE: 99 F | BODY MASS INDEX: 24.91 KG/M2 | WEIGHT: 155 LBS

## 2018-08-07 DIAGNOSIS — T83.011A MALFUNCTION OF INDWELLING URINARY CATHETER, INITIAL ENCOUNTER (HCC): Primary | ICD-10-CM

## 2018-08-07 PROCEDURE — 99283 EMERGENCY DEPT VISIT LOW MDM: CPT

## 2018-08-07 PROCEDURE — 51798 US URINE CAPACITY MEASURE: CPT

## 2018-08-08 ENCOUNTER — HOSPITAL ENCOUNTER (EMERGENCY)
Age: 68
Discharge: HOME | End: 2018-08-08
Attending: EMERGENCY MEDICINE
Payer: MEDICAID

## 2018-08-08 VITALS
OXYGEN SATURATION: 100 % | DIASTOLIC BLOOD PRESSURE: 80 MMHG | SYSTOLIC BLOOD PRESSURE: 115 MMHG | HEART RATE: 67 BPM | RESPIRATION RATE: 16 BRPM

## 2018-08-08 DIAGNOSIS — R33.9 URINARY RETENTION: Primary | ICD-10-CM

## 2018-08-08 DIAGNOSIS — N39.0 URINARY TRACT INFECTION WITHOUT HEMATURIA, SITE UNSPECIFIED: ICD-10-CM

## 2018-08-08 PROCEDURE — 51702 INSERT TEMP BLADDER CATH: CPT

## 2018-08-08 PROCEDURE — 99284 EMERGENCY DEPT VISIT MOD MDM: CPT

## 2018-08-08 NOTE — ED TRIAGE NOTES
patient in via ems from [facility] with catheter problem. Patient was given a bladder scan and 765 ml was retained in bladder . Doctor at bedside to reinsertcatherer.

## 2018-08-08 NOTE — ED PROVIDER NOTES
Dr. Loni Malhotra       Current Discharge Medication List      CONTINUE these medications which have NOT CHANGED    Details   amLODIPine (NORVASC) 5 MG tablet Take 5 mg by mouth daily      !! acetaminophen (TYLENOL) 325 MG tablet Take 325 mg by mouth daily      atorvastatin (LIPITOR) 10 MG tablet Take 10 mg by mouth daily      buPROPion (WELLBUTRIN XL) 150 MG extended release tablet Take 150 mg by mouth every morning      busPIRone (BUSPAR) 15 MG tablet Take 15 mg by mouth 3 times daily      cloNIDine (CATAPRES) 0.1 MG tablet Take 0.1 mg by mouth 3 times daily      Cranberry 500 MG CAPS Take 500 mg by mouth 2 times daily      cyanocobalamin 1000 MCG/ML injection Inject 1,000 mcg into the muscle See Admin Instructions THE 7TH AND 21ST OF EVERY MO      ferrous sulfate 325 (65 Fe) MG tablet Take 325 mg by mouth 2 times daily      levETIRAcetam (KEPPRA) 750 MG tablet Take 750 mg by mouth 2 times daily      lisinopril (PRINIVIL;ZESTRIL) 40 MG tablet Take 40 mg by mouth 2 times daily      loratadine (CLARITIN) 10 MG tablet Take 10 mg by mouth daily      metFORMIN (GLUCOPHAGE) 500 MG tablet Take 750 mg by mouth 2 times daily (with meals)      Multiple Vitamin (MULTIVITAMINS PO) Take by mouth daily      calcium carbonate (OSCAL) 500 MG TABS tablet Take 500 mg by mouth 2 times daily      pantoprazole (PROTONIX) 40 MG tablet Take 40 mg by mouth daily      pioglitazone (ACTOS) 30 MG tablet Take 30 mg by mouth daily      sertraline (ZOLOFT) 100 MG tablet Take 100 mg by mouth daily      Pyridoxine HCl (VITAMIN B-6) 50 MG tablet Take 25 mg by mouth daily      Cholecalciferol (VITAMIN D3) 2000 units CAPS Take by mouth Daily with lunch      !! acetaminophen (TYLENOL) 325 MG tablet Take 650 mg by mouth every 4 hours as needed for Pain      ondansetron (ZOFRAN) 4 MG tablet Take 4 mg by mouth every 6 hours as needed for Nausea or Vomiting      Probiotic CAPS Take by mouth 2 times daily      promethazine (PHENERGAN) 25 MG suppository Place 25 mg rectally every 6 hours as needed for Nausea       !! - Potential duplicate medications found. Please discuss with provider. ALLERGIES     Patient has no known allergies. FAMILY HISTORY     History reviewed. No pertinent family history. SOCIAL HISTORY       Social History     Social History    Marital status: Single     Spouse name: N/A    Number of children: N/A    Years of education: N/A     Social History Main Topics    Smoking status: Never Smoker    Smokeless tobacco: Never Used    Alcohol use No    Drug use: No    Sexual activity: No     Other Topics Concern    None     Social History Narrative    None       SCREENINGS             PHYSICAL EXAM    (up to 7 for level 4, 8 or more for level 5)     ED Triage Vitals [08/07/18 2124]   BP Temp Temp Source Pulse Resp SpO2 Height Weight   (!) 113/58 99 °F (37.2 °C) Axillary 97 18 98 % 5' 6\" (1.676 m) 155 lb (70.3 kg)       Physical Exam   Constitutional: He is oriented to person, place, and time. He appears well-developed and well-nourished. No distress. HENT:   Head: Normocephalic and atraumatic. Nose: Nose normal.   Mouth/Throat: Oropharynx is clear and moist. No oropharyngeal exudate. Eyes: Conjunctivae and EOM are normal. Pupils are equal, round, and reactive to light. Right eye exhibits no discharge. Left eye exhibits no discharge. No scleral icterus. Neck: Normal range of motion. Neck supple. No JVD present. No tracheal deviation present. No thyromegaly present. Cardiovascular: Normal rate, regular rhythm, normal heart sounds and intact distal pulses. Exam reveals no gallop and no friction rub. No murmur heard. Pulmonary/Chest: Effort normal and breath sounds normal. No stridor. No respiratory distress. He has no wheezes. He has no rales. He exhibits no tenderness. Abdominal: Soft. Bowel sounds are normal. He exhibits no distension and no mass. There is no tenderness.  There is

## 2018-08-08 NOTE — ED PROVIDER NOTES
 Hypovitaminosis D     VANIA (iron deficiency anemia)     MR (mental retardation)     Osteopenia     Seizure disorder (HCC)     Wound of back          SURGICAL HISTORY       Past Surgical History:   Procedure Laterality Date    COLONOSCOPY  8.19.11    negative    UPPER GASTROINTESTINAL ENDOSCOPY  8.19.11    by Dr. Huseyin Scott       Previous Medications    ACETAMINOPHEN (TYLENOL) 325 MG TABLET    Take 325 mg by mouth daily    ACETAMINOPHEN (TYLENOL) 325 MG TABLET    Take 650 mg by mouth every 4 hours as needed for Pain    AMLODIPINE (NORVASC) 5 MG TABLET    Take 5 mg by mouth daily    ATORVASTATIN (LIPITOR) 10 MG TABLET    Take 10 mg by mouth daily    BUPROPION (WELLBUTRIN XL) 150 MG EXTENDED RELEASE TABLET    Take 150 mg by mouth every morning    BUSPIRONE (BUSPAR) 15 MG TABLET    Take 15 mg by mouth 3 times daily    CALCIUM CARBONATE (OSCAL) 500 MG TABS TABLET    Take 500 mg by mouth 2 times daily    CHOLECALCIFEROL (VITAMIN D3) 2000 UNITS CAPS    Take by mouth Daily with lunch    CLONIDINE (CATAPRES) 0.1 MG TABLET    Take 0.1 mg by mouth 3 times daily    CRANBERRY 500 MG CAPS    Take 500 mg by mouth 2 times daily    CYANOCOBALAMIN 1000 MCG/ML INJECTION    Inject 1,000 mcg into the muscle See Admin Instructions THE 7TH AND 21ST OF EVERY MO    FERROUS SULFATE 325 (65 FE) MG TABLET    Take 325 mg by mouth 2 times daily    LEVETIRACETAM (KEPPRA) 750 MG TABLET    Take 750 mg by mouth 2 times daily    LISINOPRIL (PRINIVIL;ZESTRIL) 40 MG TABLET    Take 40 mg by mouth 2 times daily    LORATADINE (CLARITIN) 10 MG TABLET    Take 10 mg by mouth daily    METFORMIN (GLUCOPHAGE) 500 MG TABLET    Take 750 mg by mouth 2 times daily (with meals)    MULTIPLE VITAMIN (MULTIVITAMINS PO)    Take by mouth daily    ONDANSETRON (ZOFRAN) 4 MG TABLET    Take 4 mg by mouth every 6 hours as needed for Nausea or Vomiting    PANTOPRAZOLE (PROTONIX) 40 MG TABLET    Take 40 mg by mouth daily    PIOGLITAZONE (ACTOS) Resp: 16   SpO2: 100%       Patient presents to the emergency department for reevaluation of urination issues as described. We're able to easily pass a Ha catheter. He is currently being treated for UTI and the treatment will continue. No complications, catheter in place, patient will be sent back to nursing home. No orders to display              CONSULTS:  None    PROCEDURES:  Unless otherwise noted below, none     Procedures    FINAL IMPRESSION      1. Urinary retention    2.  Urinary tract infection without hematuria, site unspecified          DISPOSITION/PLAN   DISPOSITION Discharge - Pending Orders Complete 08/08/2018 04:22:14 AM      PATIENT REFERRED TO:  Marilyn Julian MD  [address] [facility]  [phone]    In 3 days        DISCHARGE MEDICATIONS:  New Prescriptions    No medications on file          (Please note that portions of this note were completed with a voice recognition program.  Efforts were made to edit the dictations but occasionally words are mis-transcribed.)    Lori Major DO (electronically signed)  Attending Emergency Physician          Lori Major DO  08/08/18 9815

## 2018-08-09 ENCOUNTER — OFFICE VISIT (OUTPATIENT)
Dept: GERIATRIC MEDICINE | Age: 68
End: 2018-08-09
Payer: MEDICAID

## 2018-08-09 DIAGNOSIS — E08.22 DIABETES MELLITUS DUE TO UNDERLYING CONDITION WITH STAGE 1 CHRONIC KIDNEY DISEASE, WITHOUT LONG-TERM CURRENT USE OF INSULIN (HCC): Primary | ICD-10-CM

## 2018-08-09 DIAGNOSIS — F79 DISORDER OF INTELLECTUAL DEVELOPMENT: ICD-10-CM

## 2018-08-09 DIAGNOSIS — I10 ESSENTIAL HYPERTENSION: ICD-10-CM

## 2018-08-09 DIAGNOSIS — N18.1 DIABETES MELLITUS DUE TO UNDERLYING CONDITION WITH STAGE 1 CHRONIC KIDNEY DISEASE, WITHOUT LONG-TERM CURRENT USE OF INSULIN (HCC): Primary | ICD-10-CM

## 2018-08-09 PROCEDURE — 99308 SBSQ NF CARE LOW MDM 20: CPT | Performed by: NURSE PRACTITIONER

## 2018-08-27 VITALS
HEART RATE: 72 BPM | OXYGEN SATURATION: 97 % | SYSTOLIC BLOOD PRESSURE: 100 MMHG | TEMPERATURE: 98.4 F | RESPIRATION RATE: 16 BRPM | DIASTOLIC BLOOD PRESSURE: 62 MMHG

## 2018-10-23 ENCOUNTER — OFFICE VISIT (OUTPATIENT)
Dept: GERIATRIC MEDICINE | Age: 68
End: 2018-10-23
Payer: MEDICAID

## 2018-10-23 DIAGNOSIS — E08.22 DIABETES MELLITUS DUE TO UNDERLYING CONDITION WITH STAGE 1 CHRONIC KIDNEY DISEASE, WITHOUT LONG-TERM CURRENT USE OF INSULIN (HCC): ICD-10-CM

## 2018-10-23 DIAGNOSIS — N18.1 DIABETES MELLITUS DUE TO UNDERLYING CONDITION WITH STAGE 1 CHRONIC KIDNEY DISEASE, WITHOUT LONG-TERM CURRENT USE OF INSULIN (HCC): ICD-10-CM

## 2018-10-23 DIAGNOSIS — Z97.8 CHRONIC INDWELLING FOLEY CATHETER: ICD-10-CM

## 2018-10-23 DIAGNOSIS — F79 DISORDER OF INTELLECTUAL DEVELOPMENT: Primary | ICD-10-CM

## 2018-10-23 PROCEDURE — 99308 SBSQ NF CARE LOW MDM 20: CPT | Performed by: NURSE PRACTITIONER

## 2018-11-03 ENCOUNTER — HOSPITAL ENCOUNTER (EMERGENCY)
Age: 68
Discharge: HOME | End: 2018-11-03
Attending: EMERGENCY MEDICINE
Payer: MEDICAID

## 2018-11-03 VITALS
TEMPERATURE: 98.2 F | WEIGHT: 150 LBS | HEIGHT: 68 IN | RESPIRATION RATE: 18 BRPM | BODY MASS INDEX: 22.73 KG/M2 | HEART RATE: 56 BPM | OXYGEN SATURATION: 97 % | DIASTOLIC BLOOD PRESSURE: 71 MMHG | SYSTOLIC BLOOD PRESSURE: 135 MMHG

## 2018-11-03 DIAGNOSIS — T83.021A DISLODGED FOLEY CATHETER, INITIAL ENCOUNTER: Primary | ICD-10-CM

## 2018-11-03 PROCEDURE — 51701 INSERT BLADDER CATHETER: CPT

## 2018-11-03 PROCEDURE — 99284 EMERGENCY DEPT VISIT MOD MDM: CPT

## 2018-11-03 ASSESSMENT — ENCOUNTER SYMPTOMS
ABDOMINAL PAIN: 0
PHOTOPHOBIA: 0
SINUS PRESSURE: 0
COUGH: 0
ABDOMINAL DISTENTION: 0
BACK PAIN: 0
DIARRHEA: 0
VOMITING: 0
SHORTNESS OF BREATH: 0
COLOR CHANGE: 0
APNEA: 0
NAUSEA: 0
SORE THROAT: 0
RHINORRHEA: 0
CONSTIPATION: 0
EYE PAIN: 0
WHEEZING: 0

## 2018-11-03 NOTE — ED PROVIDER NOTES
MEDICATIONS:  Discharge Medication List as of 11/3/2018  1:16 PM             (Please note that portions of this note were completed with a voice recognitionprogram.  Efforts were made to edit the dictations but occasionally words are mis-transcribed.)    Yelitza Rose MD (electronically signed)  Attending Emergency Physician          Yelitza Rose MD  11/03/18 0458

## 2018-11-03 NOTE — ED NOTES
Inserted 16 F gayle per Wes Davies and Dr. Clifton Chavez. Immediate return of clear yellow urine. Patient tolerated well.  Patient to [facility] Ellis RN  11/03/18 4400

## 2018-11-07 VITALS
TEMPERATURE: 97.4 F | HEART RATE: 57 BPM | RESPIRATION RATE: 18 BRPM | DIASTOLIC BLOOD PRESSURE: 90 MMHG | SYSTOLIC BLOOD PRESSURE: 125 MMHG | WEIGHT: 127.8 LBS | BODY MASS INDEX: 20.63 KG/M2 | OXYGEN SATURATION: 97 %

## 2018-12-20 ENCOUNTER — OFFICE VISIT (OUTPATIENT)
Dept: GERIATRIC MEDICINE | Age: 68
End: 2018-12-20
Payer: MEDICAID

## 2018-12-20 DIAGNOSIS — E08.22 DIABETES MELLITUS DUE TO UNDERLYING CONDITION WITH STAGE 1 CHRONIC KIDNEY DISEASE, WITHOUT LONG-TERM CURRENT USE OF INSULIN (HCC): Primary | ICD-10-CM

## 2018-12-20 DIAGNOSIS — N31.9 NEUROGENIC DYSFUNCTION OF THE URINARY BLADDER: ICD-10-CM

## 2018-12-20 DIAGNOSIS — N18.1 DIABETES MELLITUS DUE TO UNDERLYING CONDITION WITH STAGE 1 CHRONIC KIDNEY DISEASE, WITHOUT LONG-TERM CURRENT USE OF INSULIN (HCC): Primary | ICD-10-CM

## 2018-12-20 DIAGNOSIS — I10 ESSENTIAL HYPERTENSION: ICD-10-CM

## 2018-12-20 DIAGNOSIS — F79 DISORDER OF INTELLECTUAL DEVELOPMENT: ICD-10-CM

## 2018-12-20 PROCEDURE — 99308 SBSQ NF CARE LOW MDM 20: CPT | Performed by: NURSE PRACTITIONER

## 2019-01-22 VITALS
HEART RATE: 67 BPM | BODY MASS INDEX: 19.71 KG/M2 | OXYGEN SATURATION: 97 % | DIASTOLIC BLOOD PRESSURE: 80 MMHG | RESPIRATION RATE: 16 BRPM | WEIGHT: 129.6 LBS | SYSTOLIC BLOOD PRESSURE: 129 MMHG | TEMPERATURE: 97 F

## 2019-01-22 PROBLEM — N31.9 NEUROGENIC DYSFUNCTION OF THE URINARY BLADDER: Status: ACTIVE | Noted: 2019-01-22

## 2019-01-22 RX ORDER — CEPHALEXIN 500 MG/1
500 CAPSULE ORAL
COMMUNITY
Start: 2019-01-15 | End: 2019-01-25

## 2019-02-13 ENCOUNTER — OFFICE VISIT (OUTPATIENT)
Dept: GERIATRIC MEDICINE | Age: 69
End: 2019-02-13
Payer: MEDICAID

## 2019-02-13 DIAGNOSIS — E08.22 DIABETES MELLITUS DUE TO UNDERLYING CONDITION WITH STAGE 1 CHRONIC KIDNEY DISEASE, WITHOUT LONG-TERM CURRENT USE OF INSULIN (HCC): Primary | ICD-10-CM

## 2019-02-13 DIAGNOSIS — N18.1 DIABETES MELLITUS DUE TO UNDERLYING CONDITION WITH STAGE 1 CHRONIC KIDNEY DISEASE, WITHOUT LONG-TERM CURRENT USE OF INSULIN (HCC): Primary | ICD-10-CM

## 2019-02-13 PROCEDURE — 99308 SBSQ NF CARE LOW MDM 20: CPT | Performed by: PHYSICIAN ASSISTANT

## 2019-02-16 ENCOUNTER — HOSPITAL ENCOUNTER (EMERGENCY)
Age: 69
Discharge: HOME | End: 2019-02-16
Attending: EMERGENCY MEDICINE
Payer: MEDICAID

## 2019-02-16 VITALS
RESPIRATION RATE: 20 BRPM | TEMPERATURE: 98.9 F | SYSTOLIC BLOOD PRESSURE: 143 MMHG | HEART RATE: 74 BPM | OXYGEN SATURATION: 98 % | DIASTOLIC BLOOD PRESSURE: 75 MMHG

## 2019-02-16 DIAGNOSIS — Z46.6 URINARY CATHETER (FOLEY) CHANGE REQUIRED: Primary | ICD-10-CM

## 2019-02-16 PROCEDURE — 99284 EMERGENCY DEPT VISIT MOD MDM: CPT

## 2019-02-16 PROCEDURE — 51701 INSERT BLADDER CATHETER: CPT

## 2019-02-27 ENCOUNTER — OFFICE VISIT (OUTPATIENT)
Dept: GERIATRIC MEDICINE | Age: 69
End: 2019-02-27
Payer: MEDICAID

## 2019-02-27 DIAGNOSIS — N39.0 CHRONIC UTI: ICD-10-CM

## 2019-02-27 DIAGNOSIS — Z97.8 CHRONIC INDWELLING FOLEY CATHETER: ICD-10-CM

## 2019-02-27 DIAGNOSIS — I10 ESSENTIAL HYPERTENSION: Primary | ICD-10-CM

## 2019-02-27 DIAGNOSIS — E08.22 DIABETES MELLITUS DUE TO UNDERLYING CONDITION WITH STAGE 1 CHRONIC KIDNEY DISEASE, WITHOUT LONG-TERM CURRENT USE OF INSULIN (HCC): ICD-10-CM

## 2019-02-27 DIAGNOSIS — N18.1 DIABETES MELLITUS DUE TO UNDERLYING CONDITION WITH STAGE 1 CHRONIC KIDNEY DISEASE, WITHOUT LONG-TERM CURRENT USE OF INSULIN (HCC): ICD-10-CM

## 2019-02-27 PROCEDURE — 99309 SBSQ NF CARE MODERATE MDM 30: CPT | Performed by: PHYSICIAN ASSISTANT

## 2019-03-08 ENCOUNTER — APPOINTMENT (OUTPATIENT)
Dept: GENERAL RADIOLOGY | Age: 69
End: 2019-03-08
Payer: MEDICAID

## 2019-03-08 ENCOUNTER — HOSPITAL ENCOUNTER (EMERGENCY)
Age: 69
Discharge: HOME | End: 2019-03-08
Attending: EMERGENCY MEDICINE
Payer: MEDICAID

## 2019-03-08 VITALS
DIASTOLIC BLOOD PRESSURE: 80 MMHG | SYSTOLIC BLOOD PRESSURE: 160 MMHG | HEART RATE: 87 BPM | TEMPERATURE: 97.3 F | RESPIRATION RATE: 16 BRPM | WEIGHT: 165 LBS | BODY MASS INDEX: 25.09 KG/M2 | OXYGEN SATURATION: 98 %

## 2019-03-08 DIAGNOSIS — R09.89 CHOKING EPISODE: Primary | ICD-10-CM

## 2019-03-08 PROCEDURE — 99283 EMERGENCY DEPT VISIT LOW MDM: CPT

## 2019-03-08 PROCEDURE — 71045 X-RAY EXAM CHEST 1 VIEW: CPT

## 2019-03-31 VITALS
BODY MASS INDEX: 19.86 KG/M2 | RESPIRATION RATE: 16 BRPM | OXYGEN SATURATION: 97 % | HEART RATE: 74 BPM | DIASTOLIC BLOOD PRESSURE: 79 MMHG | TEMPERATURE: 97.6 F | WEIGHT: 130.6 LBS | SYSTOLIC BLOOD PRESSURE: 112 MMHG

## 2019-04-24 ENCOUNTER — OFFICE VISIT (OUTPATIENT)
Dept: GERIATRIC MEDICINE | Age: 69
End: 2019-04-24
Payer: MEDICAID

## 2019-04-24 DIAGNOSIS — M85.80 OSTEOPENIA, UNSPECIFIED LOCATION: ICD-10-CM

## 2019-04-24 DIAGNOSIS — G40.909 SEIZURE DISORDER (HCC): ICD-10-CM

## 2019-04-24 DIAGNOSIS — K21.9 GASTROESOPHAGEAL REFLUX DISEASE WITHOUT ESOPHAGITIS: Primary | ICD-10-CM

## 2019-04-24 PROCEDURE — 99308 SBSQ NF CARE LOW MDM 20: CPT | Performed by: PHYSICIAN ASSISTANT

## 2019-05-20 VITALS
RESPIRATION RATE: 16 BRPM | TEMPERATURE: 98.2 F | DIASTOLIC BLOOD PRESSURE: 68 MMHG | OXYGEN SATURATION: 98 % | HEART RATE: 69 BPM | SYSTOLIC BLOOD PRESSURE: 130 MMHG

## 2019-06-05 ENCOUNTER — OFFICE VISIT (OUTPATIENT)
Dept: GERIATRIC MEDICINE | Age: 69
End: 2019-06-05
Payer: MEDICAID

## 2019-06-05 DIAGNOSIS — I10 ESSENTIAL HYPERTENSION: ICD-10-CM

## 2019-06-05 DIAGNOSIS — N39.0 CHRONIC UTI: ICD-10-CM

## 2019-06-05 DIAGNOSIS — G40.909 SEIZURE DISORDER (HCC): ICD-10-CM

## 2019-06-05 DIAGNOSIS — F79 DISORDER OF INTELLECTUAL DEVELOPMENT: Primary | ICD-10-CM

## 2019-06-05 PROCEDURE — 99308 SBSQ NF CARE LOW MDM 20: CPT | Performed by: PHYSICIAN ASSISTANT

## 2019-06-30 ENCOUNTER — HOSPITAL ENCOUNTER (EMERGENCY)
Age: 69
Discharge: HOME | End: 2019-06-30
Payer: MEDICAID

## 2019-06-30 ENCOUNTER — APPOINTMENT (OUTPATIENT)
Dept: GENERAL RADIOLOGY | Age: 69
End: 2019-06-30
Payer: MEDICAID

## 2019-06-30 VITALS
HEART RATE: 69 BPM | RESPIRATION RATE: 17 BRPM | OXYGEN SATURATION: 97 % | SYSTOLIC BLOOD PRESSURE: 114 MMHG | TEMPERATURE: 96.9 F | DIASTOLIC BLOOD PRESSURE: 78 MMHG

## 2019-06-30 DIAGNOSIS — R60.9 DEPENDENT EDEMA: Primary | ICD-10-CM

## 2019-06-30 LAB
ALBUMIN SERPL-MCNC: 3.9 G/DL (ref 3.5–4.6)
ALP BLD-CCNC: 131 U/L (ref 35–104)
ALT SERPL-CCNC: 6 U/L (ref 0–41)
ANION GAP SERPL CALCULATED.3IONS-SCNC: 12 MEQ/L (ref 9–15)
APTT: 29.9 SEC (ref 21.6–35.4)
AST SERPL-CCNC: 12 U/L (ref 0–40)
BASOPHILS ABSOLUTE: 0.1 K/UL (ref 0–0.2)
BASOPHILS RELATIVE PERCENT: 0.6 %
BILIRUB SERPL-MCNC: <0.2 MG/DL (ref 0.2–0.7)
BUN BLDV-MCNC: 15 MG/DL (ref 8–23)
CALCIUM SERPL-MCNC: 9.5 MG/DL (ref 8.5–9.9)
CHLORIDE BLD-SCNC: 102 MEQ/L (ref 95–107)
CO2: 27 MEQ/L (ref 20–31)
CREAT SERPL-MCNC: 0.36 MG/DL (ref 0.7–1.2)
EOSINOPHILS ABSOLUTE: 0.3 K/UL (ref 0–0.7)
EOSINOPHILS RELATIVE PERCENT: 2.9 %
GFR AFRICAN AMERICAN: >60
GFR NON-AFRICAN AMERICAN: >60
GLOBULIN: 3.5 G/DL (ref 2.3–3.5)
GLUCOSE BLD-MCNC: 152 MG/DL (ref 70–99)
HCT VFR BLD CALC: 36.4 % (ref 42–52)
HEMOGLOBIN: 12.3 G/DL (ref 14–18)
INR BLD: 1.1
LYMPHOCYTES ABSOLUTE: 2.3 K/UL (ref 1–4.8)
LYMPHOCYTES RELATIVE PERCENT: 19.7 %
MCH RBC QN AUTO: 30.4 PG (ref 27–31.3)
MCHC RBC AUTO-ENTMCNC: 33.7 % (ref 33–37)
MCV RBC AUTO: 89.9 FL (ref 80–100)
MONOCYTES ABSOLUTE: 1 K/UL (ref 0.2–0.8)
MONOCYTES RELATIVE PERCENT: 8.5 %
NEUTROPHILS ABSOLUTE: 8 K/UL (ref 1.4–6.5)
NEUTROPHILS RELATIVE PERCENT: 68.3 %
PDW BLD-RTO: 14.6 % (ref 11.5–14.5)
PLATELET # BLD: 253 K/UL (ref 130–400)
POTASSIUM SERPL-SCNC: 3.8 MEQ/L (ref 3.4–4.9)
PRO-BNP: 173 PG/ML
PROTHROMBIN TIME: 10.7 SEC (ref 9–11.5)
RBC # BLD: 4.05 M/UL (ref 4.7–6.1)
SODIUM BLD-SCNC: 141 MEQ/L (ref 135–144)
TOTAL PROTEIN: 7.4 G/DL (ref 6.3–8)
WBC # BLD: 11.7 K/UL (ref 4.8–10.8)

## 2019-06-30 PROCEDURE — 85730 THROMBOPLASTIN TIME PARTIAL: CPT

## 2019-06-30 PROCEDURE — 36415 COLL VENOUS BLD VENIPUNCTURE: CPT

## 2019-06-30 PROCEDURE — 83880 ASSAY OF NATRIURETIC PEPTIDE: CPT

## 2019-06-30 PROCEDURE — 85610 PROTHROMBIN TIME: CPT

## 2019-06-30 PROCEDURE — 99284 EMERGENCY DEPT VISIT MOD MDM: CPT

## 2019-06-30 PROCEDURE — 80053 COMPREHEN METABOLIC PANEL: CPT

## 2019-06-30 PROCEDURE — 85025 COMPLETE CBC W/AUTO DIFF WBC: CPT

## 2019-06-30 PROCEDURE — 71045 X-RAY EXAM CHEST 1 VIEW: CPT

## 2019-06-30 ASSESSMENT — ENCOUNTER SYMPTOMS
SORE THROAT: 0
RHINORRHEA: 0
EYE DISCHARGE: 0
COLOR CHANGE: 0
CONSTIPATION: 0
ABDOMINAL DISTENTION: 0
SHORTNESS OF BREATH: 0
ABDOMINAL PAIN: 0

## 2019-06-30 NOTE — ED NOTES
Bed: 08  Expected date: 6/30/19  Expected time:   Means of arrival:   Comments:  79 male with swollen legd.  One touch [id number]     Delano Sinha RN  06/30/19 5908

## 2019-06-30 NOTE — ED PROVIDER NOTES
arthralgias. Skin: Negative for color change. Neurological: Negative for dizziness, syncope, numbness and headaches. Psychiatric/Behavioral: Negative for agitation and confusion. Except as noted above the remainder of the review of systems was reviewed and negative.        PAST MEDICAL HISTORY     Past Medical History:   Diagnosis Date    Anemia     Chronic indwelling Ha catheter     Chronic UTI     Chronic UTI     Dermatitis     DM [facility]di[facility]) [facility]     DM type 2 (diabetes mellitus, type 2) ([facility] 10/29/2013    Eczema     Gastric polyp     GERD (gastroesophageal reflux disease)     H/O bone density study 9.2.11    lumbar spine, lt femoral neck osteopenia, rt femoral neck normal    High blood pressure     HTN (hypertension)     Hypokalemia     Hypovitaminosis D     VANIA (iron deficiency anemia)     MR (mental retardation)     Neurogenic dysfunction of the urinary bladder 1/22/2019    Osteopenia     Seizure disorder (HCC)     Wound of back          SURGICALHISTORY       Past Surgical History:   Procedure Laterality Date    COLONOSCOPY  8.19.11    negative    UPPER GASTROINTESTINAL ENDOSCOPY  8.19.11    by Dr. Luca Vasquez       Previous Medications    ACETAMINOPHEN (TYLENOL) 325 MG TABLET    Take 325 mg by mouth daily    ACETAMINOPHEN (TYLENOL) 325 MG TABLET    Take 650 mg by mouth every 4 hours as needed for Pain    AMLODIPINE (NORVASC) 5 MG TABLET    Take 5 mg by mouth daily    ATORVASTATIN (LIPITOR) 10 MG TABLET    Take 10 mg by mouth daily    BUPROPION (WELLBUTRIN XL) 150 MG EXTENDED RELEASE TABLET    Take 150 mg by mouth every morning    BUSPIRONE (BUSPAR) 15 MG TABLET    Take 15 mg by mouth 3 times daily    CALCIUM CARBONATE (OSCAL) 500 MG TABS TABLET    Take 500 mg by mouth 2 times daily    CHOLECALCIFEROL (VITAMIN D3) 2000 UNITS CAPS    Take by mouth Daily with lunch    CLONIDINE (CATAPRES) 0.1 MG TABLET    Take 0.1 mg by mouth 3 times daily such as CT, Ultrasound and MRI are read by the radiologist. Plain radiographic images are visualized and preliminarily interpreted by the emergency physician with the below findings:    Portable chest x-ray is negative for acute process no pulmonary edema or focal effusions or infiltrate    Interpretation per the Radiologist below, if available at the time ofthis note:    XR CHEST PORTABLE    (Results Pending)         ED BEDSIDE ULTRASOUND:   Performed by ED Physician - none    LABS:  Labs Reviewed   COMPREHENSIVE METABOLIC PANEL - Abnormal; Notable for the following components:       Result Value    Glucose 152 (*)     CREATININE 0.36 (*)     Alkaline Phosphatase 131 (*)     All other components within normal limits   CBC WITH AUTO DIFFERENTIAL - Abnormal; Notable for the following components:    WBC 11.7 (*)     RBC 4.05 (*)     Hemoglobin 12.3 (*)     Hematocrit 36.4 (*)     RDW 14.6 (*)     Neutrophils # 8.0 (*)     Monocytes # 1.0 (*)     All other components within normal limits   BRAIN NATRIURETIC PEPTIDE   PROTIME-INR   APTT       All other labs were within normal range or not returned as of this dictation. EMERGENCY DEPARTMENT COURSE and DIFFERENTIAL DIAGNOSIS/MDM:   Vitals:    Vitals:    06/30/19 1649 06/30/19 1749 06/30/19 1849 06/30/19 1939   BP: 124/71 132/76 122/64 118/67   Pulse: 81 78 70 77   Resp: 17 16 17 18   Temp: 96.9 °F (36.1 °C)      TempSrc: Temporal      SpO2: 97% 97% 97% 96%       GRETA Root assumed care of this patient at 1800 hrs. awaiting final lab results     MDM  Patient presents is afebrile nontoxic no acute distress hemodynamically stable. Patient has no symptoms or signs of respiratory distress cough or congestion. Lab work was ordered for further evaluation of noted dependent edema of the bilateral lower extremity's +2.   Patient's chart review shows the patient has experienced this in the past.  The new nursing home staff at the patient only for the past few days,

## 2019-06-30 NOTE — ED TRIAGE NOTES
Pt brought by Chanel Marinelli from [facility] ([facility]) for c/o bilateral feet edema. Per Chanel Marinelli employees at [facility] stated he is a new patient and they are unsure if this is the way they always appear.

## 2019-07-29 ENCOUNTER — HOSPITAL ENCOUNTER (EMERGENCY)
Age: 69
Discharge: HOME | End: 2019-07-29
Payer: MEDICAID

## 2019-07-29 VITALS
HEART RATE: 68 BPM | SYSTOLIC BLOOD PRESSURE: 122 MMHG | RESPIRATION RATE: 18 BRPM | DIASTOLIC BLOOD PRESSURE: 68 MMHG | TEMPERATURE: 98.7 F | WEIGHT: 180 LBS | HEIGHT: 70 IN | OXYGEN SATURATION: 96 % | BODY MASS INDEX: 25.77 KG/M2

## 2019-07-29 DIAGNOSIS — T83.511A URINARY TRACT INFECTION ASSOCIATED WITH INDWELLING URETHRAL CATHETER, INITIAL ENCOUNTER (HCC): Primary | ICD-10-CM

## 2019-07-29 DIAGNOSIS — N39.0 URINARY TRACT INFECTION ASSOCIATED WITH INDWELLING URETHRAL CATHETER, INITIAL ENCOUNTER (HCC): Primary | ICD-10-CM

## 2019-07-29 LAB
BACTERIA: ABNORMAL /HPF
BILIRUBIN URINE: NEGATIVE
BLOOD, URINE: ABNORMAL
CLARITY: ABNORMAL
COLOR: ABNORMAL
EPITHELIAL CELLS, UA: ABNORMAL /HPF (ref 0–5)
GLUCOSE URINE: NEGATIVE MG/DL
HYALINE CASTS: ABNORMAL /HPF (ref 0–5)
KETONES, URINE: NEGATIVE MG/DL
LEUKOCYTE ESTERASE, URINE: ABNORMAL
NITRITE, URINE: NEGATIVE
PH UA: >=9 (ref 5–9)
PROTEIN UA: 100 MG/DL
RBC UA: ABNORMAL /HPF (ref 0–5)
SPECIFIC GRAVITY UA: 1.02 (ref 1–1.03)
URINE REFLEX TO CULTURE: YES
UROBILINOGEN, URINE: 1 E.U./DL
WBC UA: ABNORMAL /HPF (ref 0–5)

## 2019-07-29 PROCEDURE — 87086 URINE CULTURE/COLONY COUNT: CPT

## 2019-07-29 PROCEDURE — 87077 CULTURE AEROBIC IDENTIFY: CPT

## 2019-07-29 PROCEDURE — 87186 SC STD MICRODIL/AGAR DIL: CPT

## 2019-07-29 PROCEDURE — 6370000000 HC RX 637 (ALT 250 FOR IP): Performed by: NURSE PRACTITIONER

## 2019-07-29 PROCEDURE — 99283 EMERGENCY DEPT VISIT LOW MDM: CPT

## 2019-07-29 PROCEDURE — 81001 URINALYSIS AUTO W/SCOPE: CPT

## 2019-07-29 RX ORDER — SULFAMETHOXAZOLE AND TRIMETHOPRIM 800; 160 MG/1; MG/1
1 TABLET ORAL 2 TIMES DAILY
Qty: 14 TABLET | Refills: 0 | Status: SHIPPED | OUTPATIENT
Start: 2019-07-29 | End: 2019-10-03

## 2019-07-29 RX ORDER — SULFAMETHOXAZOLE AND TRIMETHOPRIM 800; 160 MG/1; MG/1
1 TABLET ORAL ONCE
Status: COMPLETED | OUTPATIENT
Start: 2019-07-29 | End: 2019-07-29

## 2019-07-29 RX ADMIN — SULFAMETHOXAZOLE AND TRIMETHOPRIM 1 TABLET: 800; 160 TABLET ORAL at 13:19

## 2019-07-29 NOTE — ED PROVIDER NOTES
[facility] ED  EMERGENCY DEPARTMENT ENCOUNTER      Pt Name: Gilberto Whittington  MRN: [id number]  [place] 1950  Date of evaluation: 7/29/2019  Provider: Nadia Simons       Chief Complaint   Patient presents with    Hematuria         HISTORY OF PRESENT ILLNESS   (Location/Symptom, Timing/Onset,Context/Setting, Quality, Duration, Modifying Factors, Severity)  Note limiting factors. Gilberto Whittington is a 79 y.o. male who presents to the emergency department by home health nurse. Nurse felt that the patient may benefit from suprapubic cath due to ongoing pulling and irritation from catheter. Pt lives in group home and nurse was unsure of way to go about getting evaluation for suprapubic cath. Per caregiver at bedside \"she told us to squad him to the ED. \"  No acute problems or complaints. No fever, change in appetite or intake. Location/Symptom - eval  Onset - today  Context/Setting - as above  Quality - evaluation  Duration - today  Modifying Factors - none, nonverbal  Severity - mild        Nursing Notes were reviewed. REVIEW OF SYSTEMS    (2-9 systems for level 4, 10 or more for level 5)     Review of Systems   Constitutional: Negative for chills, diaphoresis, fatigue and fever. HENT: Negative for congestion, rhinorrhea and sore throat. Eyes: Negative for photophobia and pain. Respiratory: Negative for cough and shortness of breath. Cardiovascular: Negative for chest pain and palpitations. Gastrointestinal: Negative for abdominal pain, diarrhea, nausea and vomiting. Genitourinary: Negative for dysuria and flank pain. Musculoskeletal: Negative for back pain. Skin: Negative for rash. Neurological: Negative for dizziness, light-headedness and headaches. Psychiatric/Behavioral: Negative. All other systems reviewed and are negative. Except as noted above the remainder of the review of systems was reviewed and negative.        PAST

## 2019-07-29 NOTE — ED TRIAGE NOTES
Patient arrived via [facility] with c/o hematuria. Per rep from [facility] the patient is new to them they do not have hx for him and he has been having issues with his gayle. Patients gayle is draining tea colored urine.

## 2019-07-30 ASSESSMENT — ENCOUNTER SYMPTOMS
PHOTOPHOBIA: 0
SORE THROAT: 0
BACK PAIN: 0
ABDOMINAL PAIN: 0
RHINORRHEA: 0
DIARRHEA: 0
VOMITING: 0
NAUSEA: 0
EYE PAIN: 0
SHORTNESS OF BREATH: 0
COUGH: 0

## 2019-07-31 LAB
ORGANISM: ABNORMAL
URINE CULTURE, ROUTINE: ABNORMAL
URINE CULTURE, ROUTINE: ABNORMAL

## 2019-08-28 ENCOUNTER — TELEPHONE (OUTPATIENT)
Dept: FAMILY MEDICINE CLINIC | Age: 69
End: 2019-08-28

## 2019-08-28 ENCOUNTER — OFFICE VISIT (OUTPATIENT)
Dept: FAMILY MEDICINE CLINIC | Age: 69
End: 2019-08-28
Payer: MEDICAID

## 2019-08-28 VITALS
SYSTOLIC BLOOD PRESSURE: 116 MMHG | RESPIRATION RATE: 15 BRPM | TEMPERATURE: 97.1 F | DIASTOLIC BLOOD PRESSURE: 66 MMHG | OXYGEN SATURATION: 99 % | HEART RATE: 60 BPM

## 2019-08-28 DIAGNOSIS — K21.9 GASTROESOPHAGEAL REFLUX DISEASE WITHOUT ESOPHAGITIS: ICD-10-CM

## 2019-08-28 DIAGNOSIS — Z12.5 SPECIAL SCREENING FOR MALIGNANT NEOPLASM OF PROSTATE: ICD-10-CM

## 2019-08-28 DIAGNOSIS — Z74.09 MOBILITY IMPAIRED: ICD-10-CM

## 2019-08-28 DIAGNOSIS — E78.00 PURE HYPERCHOLESTEROLEMIA: ICD-10-CM

## 2019-08-28 DIAGNOSIS — F09 COGNITIVE DISORDER: ICD-10-CM

## 2019-08-28 DIAGNOSIS — I10 ESSENTIAL HYPERTENSION: Primary | ICD-10-CM

## 2019-08-28 DIAGNOSIS — G40.909 SEIZURE DISORDER (HCC): ICD-10-CM

## 2019-08-28 DIAGNOSIS — E55.9 VITAMIN D DEFICIENCY: ICD-10-CM

## 2019-08-28 DIAGNOSIS — E53.1 VITAMIN B6 DEFICIENCY: ICD-10-CM

## 2019-08-28 DIAGNOSIS — E11.9 TYPE 2 DIABETES MELLITUS WITHOUT COMPLICATION, WITHOUT LONG-TERM CURRENT USE OF INSULIN (HCC): ICD-10-CM

## 2019-08-28 DIAGNOSIS — F41.9 ANXIETY: ICD-10-CM

## 2019-08-28 PROCEDURE — 99203 OFFICE O/P NEW LOW 30 MIN: CPT | Performed by: FAMILY MEDICINE

## 2019-08-28 RX ORDER — FOLIC ACID 1 MG/1
1 TABLET ORAL DAILY
COMMUNITY

## 2019-08-28 RX ORDER — WHEELCHAIR
EACH MISCELLANEOUS
Qty: 1 EACH | Refills: 0 | Status: SHIPPED | OUTPATIENT
Start: 2019-08-28

## 2019-08-28 RX ORDER — AMMONIUM LACTATE 12 G/100G
LOTION TOPICAL PRN
COMMUNITY

## 2019-08-28 RX ORDER — ASPIRIN 81 MG/1
81 TABLET, CHEWABLE ORAL DAILY
COMMUNITY

## 2019-08-28 ASSESSMENT — PATIENT HEALTH QUESTIONNAIRE - PHQ9
1. LITTLE INTEREST OR PLEASURE IN DOING THINGS: 0
SUM OF ALL RESPONSES TO PHQ QUESTIONS 1-9: 0
SUM OF ALL RESPONSES TO PHQ9 QUESTIONS 1 & 2: 0
2. FEELING DOWN, DEPRESSED OR HOPELESS: 0
SUM OF ALL RESPONSES TO PHQ QUESTIONS 1-9: 0

## 2019-08-28 ASSESSMENT — ENCOUNTER SYMPTOMS
SHORTNESS OF BREATH: 0
ABDOMINAL PAIN: 0

## 2019-08-29 DIAGNOSIS — G40.909 SEIZURE DISORDER (HCC): ICD-10-CM

## 2019-08-29 DIAGNOSIS — E53.1 VITAMIN B6 DEFICIENCY: ICD-10-CM

## 2019-08-29 DIAGNOSIS — Z12.5 SPECIAL SCREENING FOR MALIGNANT NEOPLASM OF PROSTATE: ICD-10-CM

## 2019-08-29 DIAGNOSIS — E11.9 TYPE 2 DIABETES MELLITUS WITHOUT COMPLICATION, WITHOUT LONG-TERM CURRENT USE OF INSULIN (HCC): ICD-10-CM

## 2019-08-29 DIAGNOSIS — I10 ESSENTIAL HYPERTENSION: ICD-10-CM

## 2019-08-29 DIAGNOSIS — E78.00 PURE HYPERCHOLESTEROLEMIA: ICD-10-CM

## 2019-08-29 DIAGNOSIS — E55.9 VITAMIN D DEFICIENCY: ICD-10-CM

## 2019-08-29 LAB
ALBUMIN SERPL-MCNC: 3.7 G/DL (ref 3.5–4.6)
ALP BLD-CCNC: 109 U/L (ref 35–104)
ALT SERPL-CCNC: 11 U/L (ref 0–41)
ANION GAP SERPL CALCULATED.3IONS-SCNC: 16 MEQ/L (ref 9–15)
AST SERPL-CCNC: 22 U/L (ref 0–40)
BASOPHILS ABSOLUTE: 0.1 K/UL (ref 0–0.2)
BASOPHILS RELATIVE PERCENT: 0.7 %
BILIRUB SERPL-MCNC: <0.2 MG/DL (ref 0.2–0.7)
BUN BLDV-MCNC: 19 MG/DL (ref 8–23)
CALCIUM SERPL-MCNC: 9.7 MG/DL (ref 8.5–9.9)
CHLORIDE BLD-SCNC: 104 MEQ/L (ref 95–107)
CHOLESTEROL, TOTAL: 131 MG/DL (ref 0–199)
CO2: 25 MEQ/L (ref 20–31)
CREAT SERPL-MCNC: 0.32 MG/DL (ref 0.7–1.2)
EOSINOPHILS ABSOLUTE: 0.2 K/UL (ref 0–0.7)
EOSINOPHILS RELATIVE PERCENT: 1.9 %
GFR AFRICAN AMERICAN: >60
GFR NON-AFRICAN AMERICAN: >60
GLOBULIN: 3.9 G/DL (ref 2.3–3.5)
GLUCOSE BLD-MCNC: 118 MG/DL (ref 70–99)
HBA1C MFR BLD: 6.4 % (ref 4.8–5.9)
HCT VFR BLD CALC: 36.1 % (ref 42–52)
HDLC SERPL-MCNC: 66 MG/DL (ref 40–59)
HEMOGLOBIN: 11.5 G/DL (ref 14–18)
LDL CHOLESTEROL CALCULATED: 55 MG/DL (ref 0–129)
LYMPHOCYTES ABSOLUTE: 1.7 K/UL (ref 1–4.8)
LYMPHOCYTES RELATIVE PERCENT: 16.2 %
MCH RBC QN AUTO: 29 PG (ref 27–31.3)
MCHC RBC AUTO-ENTMCNC: 31.7 % (ref 33–37)
MCV RBC AUTO: 91.3 FL (ref 80–100)
MONOCYTES ABSOLUTE: 0.9 K/UL (ref 0.2–0.8)
MONOCYTES RELATIVE PERCENT: 8.3 %
NEUTROPHILS ABSOLUTE: 7.6 K/UL (ref 1.4–6.5)
NEUTROPHILS RELATIVE PERCENT: 72.9 %
PDW BLD-RTO: 15.5 % (ref 11.5–14.5)
PLATELET # BLD: 211 K/UL (ref 130–400)
POTASSIUM SERPL-SCNC: 3.8 MEQ/L (ref 3.4–4.9)
PROSTATE SPECIFIC ANTIGEN: 0.01 NG/ML (ref 0–5.4)
RBC # BLD: 3.96 M/UL (ref 4.7–6.1)
SODIUM BLD-SCNC: 145 MEQ/L (ref 135–144)
TOTAL PROTEIN: 7.6 G/DL (ref 6.3–8)
TRIGL SERPL-MCNC: 50 MG/DL (ref 0–150)
VITAMIN D 25-HYDROXY: 71.2 NG/ML (ref 30–100)
WBC # BLD: 10.4 K/UL (ref 4.8–10.8)

## 2019-08-31 LAB — KEPPRA: 28 UG/ML (ref 12–46)

## 2019-09-03 ENCOUNTER — TELEPHONE (OUTPATIENT)
Dept: FAMILY MEDICINE CLINIC | Age: 69
End: 2019-09-03

## 2019-09-03 DIAGNOSIS — D64.9 ANEMIA, UNSPECIFIED TYPE: Primary | ICD-10-CM

## 2019-09-03 DIAGNOSIS — G40.909 SEIZURE DISORDER (HCC): Primary | ICD-10-CM

## 2019-09-03 LAB — VITAMIN B6: 99.8 NMOL/L (ref 20–125)

## 2019-09-03 RX ORDER — SYRING-NEEDL,DISP,INSUL,0.3 ML 30 GX5/16"
1 SYRINGE, EMPTY DISPOSABLE MISCELLANEOUS ONCE
Qty: 1 DEVICE | Refills: 0 | Status: SHIPPED | OUTPATIENT
Start: 2019-09-03 | End: 2019-09-04 | Stop reason: SDUPTHER

## 2019-09-03 NOTE — TELEPHONE ENCOUNTER
The patients needs a new lancet device, the one he has now is broken. Currently they have it taped up but will need a new generic device soon. Please advise, thank you.

## 2019-09-04 ENCOUNTER — TELEPHONE (OUTPATIENT)
Dept: FAMILY MEDICINE CLINIC | Age: 69
End: 2019-09-04

## 2019-09-04 RX ORDER — SYRING-NEEDL,DISP,INSUL,0.3 ML 30 GX5/16"
1 SYRINGE, EMPTY DISPOSABLE MISCELLANEOUS ONCE
Qty: 1 DEVICE | Refills: 0 | Status: SHIPPED | OUTPATIENT
Start: 2019-09-04 | End: 2021-04-09

## 2019-09-05 VITALS
OXYGEN SATURATION: 97 % | SYSTOLIC BLOOD PRESSURE: 114 MMHG | HEART RATE: 76 BPM | DIASTOLIC BLOOD PRESSURE: 72 MMHG | TEMPERATURE: 97.6 F

## 2019-09-05 NOTE — PROGRESS NOTES
David Sampson : 1950 DOS: 2019     RESCARE OF Dereck Lo[facility]    HISTORY OF PRESENT ILLNESS: The patient is being seen today for bimonthly follow up visit for chronic conditions. 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 The patient is doing well, up at this point baseline, had recent catheter replacement this a.m. with Dr. Josey Norwood. No new issues of concern. No new symptoms of UTI at this time. Does have history of chronic infections in the past. Blood sugar is fairly well controlled currently. Vital signs are stable. Overall, the patient has no acute issues. GERD symptoms are well managed at this time with current regimen. Overall baseline visit. MEDICATIONS: Reviewed. REVIEW OF SYSTEMS: The patient IDD disallows for adequate review of systems. Per nursing staff, no new acute issues of note. PHYSICAL EXAMINATION: VITAL SIGNS: /72, temp 97.6, pulse 76, respirations 16, pulse ox 97%. GENERAL: The patient displays good hygiene overall. The patient shows no evident distress. Seated in his wheelchair. The patient wears seatbelt for own safety. Overall awake and alert at this time. HEENT: No new evidence of erythema or exudate on oropharyngeal exam. Pupils are equal and round without injection. They are reactive to light and equal.   CARDIAC: RRR. No significant lower extremity edema. No JVD. Intact distal pulses in bilateral lower extremities. PULMONARY: LSCTA. Good entry and effort. No acute wheezes, rhonchi, or crackles noted. ABDOMINAL: Normoactive bowel sounds. Soft and nontender. No rebound tenderness noted. : The patient has indwelling chronic Ha catheter. No issues. No pus discharge, blood or cloudiness in urine at this time. The patient's urine is straw colored, slightly concentrated, mildly dark.    MENTAL STATUS: The patient is awake, alert, oriented 2/3. ASSESSMENT AND PLAN:   1. IDD - continue to be supervised and have focus with the care at facility. No new orders. 2.  Seizure disorder - will be followed by neurology. Currently no changes reported. No new changes to medications at this time. He will have follow up with neurology for any medication management. 3.  Chronic UTI  -  patient has new Ha catheter placed today. No new issues. We will maintain current management. We will follow up if new symptoms begin. 4.  Essential hypertension - will maintain current medical management. The patient's vital signs are well within normal limits today. No new orders.          Electronically Signed By: Bella Denton PA-C on 08/06/2019 00:18:16  ______________________________  Bella Denton PA-C  OR/[id number]  D: 08/05/2019 23:11:44  T: 08/05/2019 23:59:17    cc: - Leobardo Sauer

## 2019-09-10 DIAGNOSIS — D64.9 ANEMIA, UNSPECIFIED TYPE: ICD-10-CM

## 2019-09-10 LAB
FOLATE: >20 NG/ML (ref 7.3–26.1)
VITAMIN B-12: 733 PG/ML (ref 232–1245)

## 2019-09-18 ENCOUNTER — OFFICE VISIT (OUTPATIENT)
Dept: FAMILY MEDICINE CLINIC | Age: 69
End: 2019-09-18
Payer: MEDICAID

## 2019-09-18 VITALS
DIASTOLIC BLOOD PRESSURE: 70 MMHG | SYSTOLIC BLOOD PRESSURE: 130 MMHG | HEIGHT: 70 IN | RESPIRATION RATE: 14 BRPM | WEIGHT: 155 LBS | TEMPERATURE: 98 F | HEART RATE: 76 BPM | BODY MASS INDEX: 22.19 KG/M2

## 2019-09-18 DIAGNOSIS — E11.9 TYPE 2 DIABETES MELLITUS WITHOUT COMPLICATION, WITHOUT LONG-TERM CURRENT USE OF INSULIN (HCC): ICD-10-CM

## 2019-09-18 DIAGNOSIS — F09 COGNITIVE DISORDER: Primary | ICD-10-CM

## 2019-09-18 DIAGNOSIS — D64.9 ANEMIA, UNSPECIFIED TYPE: ICD-10-CM

## 2019-09-18 DIAGNOSIS — Z74.09 MOBILITY IMPAIRED: ICD-10-CM

## 2019-09-18 DIAGNOSIS — G40.909 SEIZURE DISORDER (HCC): ICD-10-CM

## 2019-09-18 PROCEDURE — 99213 OFFICE O/P EST LOW 20 MIN: CPT | Performed by: FAMILY MEDICINE

## 2019-09-18 NOTE — PROGRESS NOTES
Subjective:      Patient ID: Ruthie Watt is a 79 y.o. male. HPI  Treatment Adherence:   Medication compliance:  {Desc; compliance:5303::\"compliant most of the time\"}  Diet compliance:  {Desc; compliance:5303::\"compliant most of the time\"}  Weight trend: {INCREASING/DECREASING/STABLE:27276}  Current exercise: {EXERCISE NBLN:871312132}  Barriers: {Barriers to success:66341}    Hypertension:  Home blood pressure monitoring: {NO/YES:9682277001::\"No\"}. He {is/is not:9024} adherent to a low sodium diet. Patient {denies/complains:01336} {Symptoms of Hypertension, Denies:78645}. Antihypertensive medication side effects: {Hypertension med side effects:5728::\"no medication side effects noted\"}. Use of agents associated with hypertension: {bp agents assoc with hypertension:511::\"none\"}. Sodium (mEq/L)   Date Value   08/29/2019 145 (H)    BUN (mg/dL)   Date Value   08/29/2019 19    Glucose (mg/dL)   Date Value   08/29/2019 118 (H)   06/01/2012 89      Potassium (mEq/L)   Date Value   08/29/2019 3.8    CREATININE (mg/dL)   Date Value   08/29/2019 0.32 (L)         Hyperlipidemia:  No new myalgias or GI upset on {RP HYPERLIPIDEMIA MEDS:14914}.      Lab Results   Component Value Date    CHOL 131 08/29/2019    TRIG 50 08/29/2019    HDL 66 (H) 08/29/2019    LDLCALC 55 08/29/2019     Lab Results   Component Value Date    ALT 11 08/29/2019    AST 22 08/29/2019          Review of Systems    Objective:   Physical Exam    Assessment / Plan:
of Systems    Objective:   Physical Exam    Assessment / Plan:
(ZOLOFT) 100 MG tablet Take 100 mg by mouth daily      Pyridoxine HCl (VITAMIN B-6) 50 MG tablet Take 25 mg by mouth daily      Cholecalciferol (VITAMIN D3) 2000 units CAPS Take by mouth Daily with lunch      ondansetron (ZOFRAN) 4 MG tablet Take 4 mg by mouth every 6 hours as needed for Nausea or Vomiting      promethazine (PHENERGAN) 25 MG suppository Place 25 mg rectally every 6 hours as needed for Nausea       No current facility-administered medications for this visit. History reviewed. No pertinent family history. Past Medical History:   Diagnosis Date    Anemia     Chronic indwelling Ha catheter     Chronic UTI     Chronic UTI     Dermatitis     [facility]     DM type 2 (diabetes mellitus, type 2) ([facility] 10/29/2013    Eczema     Gastric polyp     GERD (gastroesophageal reflux disease)     H/O bone density study 9.2.11    lumbar spine, lt femoral neck osteopenia, rt femoral neck normal    High blood pressure     HTN (hypertension)     Hypokalemia     Hypovitaminosis D     VANIA (iron deficiency anemia)     MR (mental retardation)     Neurogenic dysfunction of the urinary bladder 1/22/2019    Osteopenia     Seizure disorder (HCC)     Wound of back      Objective:   /70   Pulse 76   Temp 98 °F (36.7 °C) (Oral)   Resp 14   Ht 5' 10\" (1.778 m)   Wt 155 lb (70.3 kg)   BMI 22.24 kg/m²     Physical Exam  Neuro;   Moving arms but minimal movement of legs. No controlled movements of lower extremities; Mental Status:  Awake. Does not follow commands. Heart:   Rate reg. No murmur n  Lungs;   Clear and equal breath sounds    Assessment:       Diagnosis Orders   1. Cognitive disorder  Wheelchair   2. Seizure disorder ([facility]  Wheelchair   3. Mobility impaired  Wheelchair   4. Anemia, unspecified type  Rashida Merchant MD, Gastroenterology, [facility]   5.  Type 2 diabetes mellitus without complication, without long-term current use of insulin

## 2019-10-03 ENCOUNTER — OFFICE VISIT (OUTPATIENT)
Dept: GASTROENTEROLOGY | Age: 69
End: 2019-10-03
Payer: MEDICAID

## 2019-10-03 VITALS
BODY MASS INDEX: 22.24 KG/M2 | OXYGEN SATURATION: 97 % | HEART RATE: 87 BPM | DIASTOLIC BLOOD PRESSURE: 80 MMHG | HEIGHT: 70 IN | SYSTOLIC BLOOD PRESSURE: 120 MMHG

## 2019-10-03 DIAGNOSIS — D50.9 IRON DEFICIENCY ANEMIA, UNSPECIFIED IRON DEFICIENCY ANEMIA TYPE: Primary | ICD-10-CM

## 2019-10-03 PROCEDURE — 99204 OFFICE O/P NEW MOD 45 MIN: CPT | Performed by: INTERNAL MEDICINE

## 2019-10-03 RX ORDER — LOPERAMIDE HYDROCHLORIDE 2 MG/1
2 CAPSULE ORAL 4 TIMES DAILY PRN
COMMUNITY

## 2019-10-03 RX ORDER — SODIUM, POTASSIUM,MAG SULFATES 17.5-3.13G
SOLUTION, RECONSTITUTED, ORAL ORAL
Qty: 1 BOTTLE | Refills: 0 | Status: SHIPPED | OUTPATIENT
Start: 2019-10-03

## 2019-11-07 ENCOUNTER — ANESTHESIA EVENT (OUTPATIENT)
Dept: OPERATING ROOM | Age: 69
End: 2019-11-07
Payer: MEDICAID

## 2019-11-07 ENCOUNTER — HOSPITAL ENCOUNTER (OUTPATIENT)
Age: 69
Setting detail: OUTPATIENT SURGERY
Discharge: HOME | End: 2019-11-07
Attending: INTERNAL MEDICINE | Admitting: INTERNAL MEDICINE
Payer: MEDICAID

## 2019-11-07 ENCOUNTER — ANESTHESIA (OUTPATIENT)
Dept: OPERATING ROOM | Age: 69
End: 2019-11-07
Payer: MEDICAID

## 2019-11-07 VITALS
SYSTOLIC BLOOD PRESSURE: 163 MMHG | RESPIRATION RATE: 16 BRPM | TEMPERATURE: 100 F | DIASTOLIC BLOOD PRESSURE: 83 MMHG | HEART RATE: 109 BPM | OXYGEN SATURATION: 98 %

## 2019-11-07 VITALS
DIASTOLIC BLOOD PRESSURE: 67 MMHG | SYSTOLIC BLOOD PRESSURE: 123 MMHG | RESPIRATION RATE: 15 BRPM | OXYGEN SATURATION: 100 %

## 2019-11-07 DIAGNOSIS — D50.9 IRON DEFICIENCY ANEMIA, UNSPECIFIED IRON DEFICIENCY ANEMIA TYPE: Primary | ICD-10-CM

## 2019-11-07 PROCEDURE — 43235 EGD DIAGNOSTIC BRUSH WASH: CPT | Performed by: INTERNAL MEDICINE

## 2019-11-07 PROCEDURE — 7100000010 HC PHASE II RECOVERY - FIRST 15 MIN: Performed by: INTERNAL MEDICINE

## 2019-11-07 PROCEDURE — 2709999900 HC NON-CHARGEABLE SUPPLY: Performed by: INTERNAL MEDICINE

## 2019-11-07 PROCEDURE — 7100000011 HC PHASE II RECOVERY - ADDTL 15 MIN: Performed by: INTERNAL MEDICINE

## 2019-11-07 PROCEDURE — 3700000001 HC ADD 15 MINUTES (ANESTHESIA): Performed by: INTERNAL MEDICINE

## 2019-11-07 PROCEDURE — 2580000003 HC RX 258: Performed by: INTERNAL MEDICINE

## 2019-11-07 PROCEDURE — 2580000003 HC RX 258: Performed by: NURSE ANESTHETIST, CERTIFIED REGISTERED

## 2019-11-07 PROCEDURE — 3609017100 HC EGD: Performed by: INTERNAL MEDICINE

## 2019-11-07 PROCEDURE — 6360000002 HC RX W HCPCS: Performed by: NURSE ANESTHETIST, CERTIFIED REGISTERED

## 2019-11-07 PROCEDURE — 2580000003 HC RX 258

## 2019-11-07 PROCEDURE — 3700000000 HC ANESTHESIA ATTENDED CARE: Performed by: INTERNAL MEDICINE

## 2019-11-07 PROCEDURE — 2500000003 HC RX 250 WO HCPCS: Performed by: NURSE ANESTHETIST, CERTIFIED REGISTERED

## 2019-11-07 RX ORDER — ONDANSETRON 2 MG/ML
4 INJECTION INTRAMUSCULAR; INTRAVENOUS
Status: DISCONTINUED | OUTPATIENT
Start: 2019-11-07 | End: 2019-11-07 | Stop reason: HOSPADM

## 2019-11-07 RX ORDER — SODIUM CHLORIDE, SODIUM LACTATE, POTASSIUM CHLORIDE, CALCIUM CHLORIDE 600; 310; 30; 20 MG/100ML; MG/100ML; MG/100ML; MG/100ML
INJECTION, SOLUTION INTRAVENOUS CONTINUOUS PRN
Status: DISCONTINUED | OUTPATIENT
Start: 2019-11-07 | End: 2019-11-07 | Stop reason: SDUPTHER

## 2019-11-07 RX ORDER — PROPOFOL 10 MG/ML
INJECTION, EMULSION INTRAVENOUS PRN
Status: DISCONTINUED | OUTPATIENT
Start: 2019-11-07 | End: 2019-11-07 | Stop reason: SDUPTHER

## 2019-11-07 RX ORDER — LIDOCAINE HYDROCHLORIDE 20 MG/ML
INJECTION, SOLUTION INFILTRATION; PERINEURAL PRN
Status: DISCONTINUED | OUTPATIENT
Start: 2019-11-07 | End: 2019-11-07 | Stop reason: SDUPTHER

## 2019-11-07 RX ORDER — SODIUM CHLORIDE, SODIUM LACTATE, POTASSIUM CHLORIDE, CALCIUM CHLORIDE 600; 310; 30; 20 MG/100ML; MG/100ML; MG/100ML; MG/100ML
INJECTION, SOLUTION INTRAVENOUS
Status: COMPLETED
Start: 2019-11-07 | End: 2019-11-07

## 2019-11-07 RX ORDER — MAGNESIUM HYDROXIDE 1200 MG/15ML
LIQUID ORAL PRN
Status: DISCONTINUED | OUTPATIENT
Start: 2019-11-07 | End: 2019-11-07 | Stop reason: ALTCHOICE

## 2019-11-07 RX ADMIN — SODIUM CHLORIDE, POTASSIUM CHLORIDE, SODIUM LACTATE AND CALCIUM CHLORIDE 1 ML/HR: 600; 310; 30; 20 INJECTION, SOLUTION INTRAVENOUS at 12:25

## 2019-11-07 RX ADMIN — SODIUM CHLORIDE, POTASSIUM CHLORIDE, SODIUM LACTATE AND CALCIUM CHLORIDE: 600; 310; 30; 20 INJECTION, SOLUTION INTRAVENOUS at 13:42

## 2019-11-07 RX ADMIN — LIDOCAINE HYDROCHLORIDE 60 MG: 20 INJECTION, SOLUTION INFILTRATION; PERINEURAL at 13:51

## 2019-11-07 RX ADMIN — PROPOFOL 200 MG: 10 INJECTION, EMULSION INTRAVENOUS at 13:51

## 2019-11-07 ASSESSMENT — PULMONARY FUNCTION TESTS
PIF_VALUE: 0
PIF_VALUE: 1
PIF_VALUE: 0

## 2019-11-11 DIAGNOSIS — E11.9 TYPE 2 DIABETES MELLITUS WITHOUT COMPLICATION, WITHOUT LONG-TERM CURRENT USE OF INSULIN (HCC): ICD-10-CM

## 2019-11-11 LAB — HBA1C MFR BLD: 6.7 % (ref 4.8–5.9)

## 2019-11-15 ENCOUNTER — TELEPHONE (OUTPATIENT)
Dept: FAMILY MEDICINE CLINIC | Age: 69
End: 2019-11-15

## 2020-01-21 ENCOUNTER — OFFICE VISIT (OUTPATIENT)
Dept: FAMILY MEDICINE CLINIC | Age: 70
End: 2020-01-21
Payer: MEDICAID

## 2020-01-21 ENCOUNTER — HOSPITAL ENCOUNTER (OUTPATIENT)
Dept: GENERAL RADIOLOGY | Age: 70
Discharge: HOME | End: 2020-01-23
Payer: MEDICAID

## 2020-01-21 VITALS
TEMPERATURE: 97.8 F | HEART RATE: 80 BPM | RESPIRATION RATE: 14 BRPM | OXYGEN SATURATION: 95 % | DIASTOLIC BLOOD PRESSURE: 74 MMHG | SYSTOLIC BLOOD PRESSURE: 120 MMHG

## 2020-01-21 PROCEDURE — 73590 X-RAY EXAM OF LOWER LEG: CPT

## 2020-01-21 PROCEDURE — 73610 X-RAY EXAM OF ANKLE: CPT

## 2020-01-21 PROCEDURE — 73630 X-RAY EXAM OF FOOT: CPT

## 2020-01-21 PROCEDURE — 99213 OFFICE O/P EST LOW 20 MIN: CPT | Performed by: FAMILY MEDICINE

## 2020-01-21 ASSESSMENT — ENCOUNTER SYMPTOMS: VOMITING: 0

## 2020-01-21 ASSESSMENT — PATIENT HEALTH QUESTIONNAIRE - PHQ9
SUM OF ALL RESPONSES TO PHQ QUESTIONS 1-9: 0
1. LITTLE INTEREST OR PLEASURE IN DOING THINGS: 0
SUM OF ALL RESPONSES TO PHQ QUESTIONS 1-9: 0

## 2020-01-21 NOTE — PROGRESS NOTES
Subjective:      Patient ID: Radha Muñoz is a 81 y.o. male    Leg Pain    There was no injury mechanism. The pain is present in the right foot, right ankle and right leg. Other   Pertinent negatives include no fever, rash, vomiting or weakness. here with care giver. Here with right leg problems over the last 5 days. No injury. Seems to be holding foot in abnormal position and seems to have slightly more swelling of leg at times. No rashes. No chills. Review of Systems   Constitutional: Negative for fever and unexpected weight change. Cardiovascular: Positive for leg swelling. Gastrointestinal: Negative for vomiting. Skin: Negative for rash. Neurological: Negative for weakness.      Reviewed allergy, medical, social, surgical, family and med list changes and updated   Files     Social History     Socioeconomic History    Marital status: Single     Spouse name: None    Number of children: None    Years of education: None    Highest education level: None   Occupational History    None   Social Needs    Financial resource strain: None    Food insecurity:     Worry: None     Inability: None    Transportation needs:     Medical: None     Non-medical: None   Tobacco Use    Smoking status: Never Smoker    Smokeless tobacco: Never Used   Substance and Sexual Activity    Alcohol use: No    Drug use: No    Sexual activity: Never   Lifestyle    Physical activity:     Days per week: None     Minutes per session: None    Stress: None   Relationships    Social connections:     Talks on phone: None     Gets together: None     Attends [employer] service: None     Active member of club or organization: None     Attends meetings of clubs or organizations: None     Relationship status: None    Intimate partner violence:     Fear of current or ex partner: None     Emotionally abused: None     Physically abused: None     Forced sexual activity: None   Other Topics Concern    None   Social History Narrative    None     Current Outpatient Medications   Medication Sig Dispense Refill    loperamide (IMODIUM) 2 MG capsule Take 2 mg by mouth 4 times daily as needed for Diarrhea      Na Sulfate-K Sulfate-Mg Sulf 17.5-3.13-1.6 GM/177ML SOLN As directed 1 Bottle 0    SITagliptin (JANUVIA) 100 MG tablet Take 100 mg by mouth      ammonium lactate (LAC-HYDRIN) 12 % lotion Apply topically as needed for Dry Skin Apply topically as needed.  aspirin 81 MG chewable tablet Take 81 mg by mouth daily      folic acid (FOLVITE) 1 MG tablet Take 1 mg by mouth daily      [facility]. Devices [facility]) MISC Manual wheel chair with foot petals  A50.49 1 each 0    Elastic Bandages & Supports (T.E.D. KNEE LENGTH/M-REGULAR) MISC 1 set of MINERVA stockings. Apply daily while upright in wheelchair as needed for dependent edema control.   Pharmacy/ DME may adjust size as appropriate with corrected measurement 1 each 0    amLODIPine (NORVASC) 5 MG tablet Take 5 mg by mouth daily      acetaminophen (TYLENOL) 325 MG tablet Take 325 mg by mouth daily      atorvastatin (LIPITOR) 10 MG tablet Take 10 mg by mouth daily      busPIRone (BUSPAR) 15 MG tablet Take 15 mg by mouth 3 times daily      cloNIDine (CATAPRES) 0.1 MG tablet Take 0.1 mg by mouth 3 times daily      Cranberry 500 MG CAPS Take 500 mg by mouth 2 times daily      ferrous sulfate 325 (65 Fe) MG tablet Take 325 mg by mouth 2 times daily      levETIRAcetam (KEPPRA) 750 MG tablet Take 750 mg by mouth 2 times daily      lisinopril (PRINIVIL;ZESTRIL) 40 MG tablet Take 40 mg by mouth 2 times daily      loratadine (CLARITIN) 10 MG tablet Take 10 mg by mouth daily      metFORMIN (GLUCOPHAGE) 500 MG tablet Take 750 mg by mouth 2 times daily (with meals)      Multiple Vitamin (MULTIVITAMINS PO) Take by mouth daily      calcium carbonate (OSCAL) 500 MG TABS tablet Take 500 mg by mouth 2 times daily      pantoprazole (PROTONIX) 40 MG tablet Take 40 mg by mouth daily  pioglitazone (ACTOS) 30 MG tablet Take 45 mg by mouth daily       sertraline (ZOLOFT) 100 MG tablet Take 100 mg by mouth daily      Pyridoxine HCl (VITAMIN B-6) 50 MG tablet Take 25 mg by mouth daily      Cholecalciferol (VITAMIN D3) 2000 units CAPS Take by mouth Daily with lunch      ondansetron (ZOFRAN) 4 MG tablet Take 4 mg by mouth every 6 hours as needed for Nausea or Vomiting      promethazine (PHENERGAN) 25 MG suppository Place 25 mg rectally every 6 hours as needed for Nausea      Lancet Device MISC 1 Device by Does not apply route once for 1 dose 1 Device 0     No current facility-administered medications for this visit. History reviewed. No pertinent family history. Past Medical History:   Diagnosis Date    Anemia     Chronic indwelling Ha catheter     Chronic UTI     Chronic UTI     Dermatitis     [facility]     DM type 2 (diabetes mellitus, type 2) ([facility] 10/29/2013    Eczema     Gastric polyp     GERD (gastroesophageal reflux disease)     H/O bone density study 9.2.11    lumbar spine, lt femoral neck osteopenia, rt femoral neck normal    High blood pressure     HTN (hypertension)     Hypokalemia     Hypovitaminosis D     VANIA (iron deficiency anemia)     MR (mental retardation)     Neurogenic dysfunction of the urinary bladder 1/22/2019    Osteopenia     Seizure disorder (HCC)     Wound of back      Objective:   /74   Pulse 80   Temp 97.8 °F (36.6 °C)   Resp 14   SpO2 95%     Physical Exam     Extremities:      Mild  Swelling and tenderness of     Right   Ankle. Tenderness over the lateral                            aspect of ankle. No heel tenderness. Mild  forefoot tenderness and swelling  swelling                                 Dorsi/plantar flexion of ankle mildly reduced secondary to pain. Minimal tenderness over distal aspect of right lower leg                                 Drawer   Test is neg. D.P  Pulses 1+ and equal  Lungs:                 Clear and equal breath sounds   Heart:                   Rate reg. No murmur                            Assessment:       Diagnosis Orders   1. Pain of right lower extremity  XR TIBIA FIBULA RIGHT (2 VIEWS)   2. Acute right ankle pain  XR ANKLE RIGHT (MIN 3 VIEWS)   3. Right foot pain  XR FOOT RIGHT (MIN 3 VIEWS)   4.  Cognitive disorder           Plan:      Orders Placed This Encounter   Procedures    XR TIBIA FIBULA RIGHT (2 VIEWS)     Standing Status:   Future     Standing Expiration Date:   1/21/2021    XR ANKLE RIGHT (MIN 3 VIEWS)     Standing Status:   Future     Standing Expiration Date:   1/21/2021    XR FOOT RIGHT (MIN 3 VIEWS)     Standing Status:   Future     Standing Expiration Date:   1/21/2021   f/u dependent on xrys-may need podiatry referral?

## 2020-02-10 PROBLEM — M21.969 FOOT DEFORMITY: Status: ACTIVE | Noted: 2020-02-10

## 2020-04-02 RX ORDER — LORATADINE 10 MG/1
TABLET ORAL
Qty: 30 TABLET | Refills: 10 | OUTPATIENT
Start: 2020-04-02

## 2020-04-24 RX ORDER — LORATADINE 10 MG/1
TABLET ORAL
Qty: 30 TABLET | Refills: 10 | OUTPATIENT
Start: 2020-04-24

## 2020-05-27 RX ORDER — LORATADINE 10 MG/1
10 TABLET ORAL DAILY
Qty: 90 TABLET | Refills: 0 | Status: SHIPPED | OUTPATIENT
Start: 2020-05-27 | End: 2020-08-04

## 2020-07-10 ENCOUNTER — HOSPITAL ENCOUNTER (EMERGENCY)
Age: 70
Discharge: HOME | End: 2020-07-11
Attending: EMERGENCY MEDICINE
Payer: MEDICAID

## 2020-07-10 VITALS
OXYGEN SATURATION: 99 % | DIASTOLIC BLOOD PRESSURE: 79 MMHG | SYSTOLIC BLOOD PRESSURE: 143 MMHG | HEART RATE: 63 BPM | RESPIRATION RATE: 18 BRPM | TEMPERATURE: 97.6 F

## 2020-07-10 PROCEDURE — 99282 EMERGENCY DEPT VISIT SF MDM: CPT

## 2020-07-11 NOTE — ED NOTES
Unable to insert 16 non-latex regular catheter. 16f latex coude inserted. Tolerated well. Urine noted.       José Miguel York RN  07/10/20 [id number]

## 2020-07-11 NOTE — ED NOTES
Patient and caregiver given discharge instructions and verbalized understanding. Vital signs stable. Resp even and unlabored. Skin warm, dry and intact. Patient or caregiver don't have any questions at this time. Patient awaiting [facility] for transportation back to group home.         Kala Gu RN  07/10/20 [id number]

## 2020-07-11 NOTE — ED PROVIDER NOTES
Current Discharge Medication List      CONTINUE these medications which have NOT CHANGED    Details   SITagliptin (JANUVIA) 100 MG tablet Take 1 tablet by mouth daily  Qty: 90 tablet, Refills: 0    Comments: Blood work and follow up prior to any more refills      loratadine (CLARITIN) 10 MG tablet Take 1 tablet by mouth daily  Qty: 90 tablet, Refills: 0      loperamide (IMODIUM) 2 MG capsule Take 2 mg by mouth 4 times daily as needed for Diarrhea      Na Sulfate-K Sulfate-Mg Sulf 17.5-3.13-1.6 GM/177ML SOLN As directed  Qty: 1 Bottle, Refills: 0      Lancet Device MISC 1 Device by Does not apply route once for 1 dose  Qty: 1 Device, Refills: 0      ammonium lactate (LAC-HYDRIN) 12 % lotion Apply topically as needed for Dry Skin Apply topically as needed. aspirin 81 MG chewable tablet Take 81 mg by mouth daily      folic acid (FOLVITE) 1 MG tablet Take 1 mg by mouth daily      Misc. Devices [facility] MISC Manual wheel chair with foot petals  A50.49  Qty: 1 each, Refills: 0      Elastic Bandages & Supports (T.E.D. KNEE LENGTH/M-REGULAR) MISC 1 set of MINERVA stockings. Apply daily while upright in wheelchair as needed for dependent edema control.   Pharmacy/ DME may adjust size as appropriate with corrected measurement  Qty: 1 each, Refills: 0      amLODIPine (NORVASC) 5 MG tablet Take 5 mg by mouth daily      acetaminophen (TYLENOL) 325 MG tablet Take 325 mg by mouth daily      atorvastatin (LIPITOR) 10 MG tablet Take 10 mg by mouth daily      busPIRone (BUSPAR) 15 MG tablet Take 15 mg by mouth 3 times daily      cloNIDine (CATAPRES) 0.1 MG tablet Take 0.1 mg by mouth 3 times daily      Cranberry 500 MG CAPS Take 500 mg by mouth 2 times daily      ferrous sulfate 325 (65 Fe) MG tablet Take 325 mg by mouth 2 times daily      levETIRAcetam (KEPPRA) 750 MG tablet Take 750 mg by mouth 2 times daily      lisinopril (PRINIVIL;ZESTRIL) 40 MG tablet Take 40 mg by mouth 2 times daily      metFORMIN (GLUCOPHAGE) 500 MG tablet Take 750 mg by mouth 2 times daily (with meals)      Multiple Vitamin (MULTIVITAMINS PO) Take by mouth daily      calcium carbonate (OSCAL) 500 MG TABS tablet Take 500 mg by mouth 2 times daily      pantoprazole (PROTONIX) 40 MG tablet Take 40 mg by mouth daily      pioglitazone (ACTOS) 30 MG tablet Take 45 mg by mouth daily       sertraline (ZOLOFT) 100 MG tablet Take 100 mg by mouth daily      Pyridoxine HCl (VITAMIN B-6) 50 MG tablet Take 25 mg by mouth daily      Cholecalciferol (VITAMIN D3) 2000 units CAPS Take by mouth Daily with lunch      ondansetron (ZOFRAN) 4 MG tablet Take 4 mg by mouth every 6 hours as needed for Nausea or Vomiting      promethazine (PHENERGAN) 25 MG suppository Place 25 mg rectally every 6 hours as needed for Nausea             ALLERGIES     Patient has no known allergies. FAMILY HISTORY     No family history on file.        SOCIAL HISTORY       Social History     Socioeconomic History    Marital status: Single     Spouse name: Not on file    Number of children: Not on file    Years of education: Not on file    Highest education level: Not on file   Occupational History    Not on file   Social Needs    Financial resource strain: Not on file    Food insecurity     Worry: Not on file     Inability: Not on file    Transportation needs     Medical: Not on file     Non-medical: Not on file   Tobacco Use    Smoking status: Never Smoker    Smokeless tobacco: Never Used   Substance and Sexual Activity    Alcohol use: No    Drug use: No    Sexual activity: Never   Lifestyle    Physical activity     Days per week: Not on file     Minutes per session: Not on file    Stress: Not on file   Relationships    Social connections     Talks on phone: Not on file     Gets together: Not on file     Attends Hoahaoism service: Not on file     Active member of club or organization: Not on file     Attends meetings of clubs or organizations: Not on file     Relationship status: Not on as CT, Ultrasound and MRI are read by the radiologist. Plain radiographic images are visualized and preliminarily interpreted by the emergency physician with the below findings:    Interpretation per the Radiologist below, if available at the time of this note:    No orders to display       LABS:  Labs Reviewed - No data to display    All other labs were within normal range or not returned as of this dictation. EMERGENCY DEPARTMENT COURSE and DIFFERENTIAL DIAGNOSIS/MDM:   Vitals:    Vitals:    07/10/20 2148   BP: (!) 143/79   Pulse: 63   Resp: 18   Temp: 97.6 °F (36.4 °C)   TempSrc: Oral   SpO2: 99%       MDM  Number of Diagnoses or Management Options  Problem with Ha catheter, initial encounter [facility]):   Diagnosis management comments: 72-year-old male presenting for catheter exchange. Catheter exchanged by nursing staff. Patient discharged from ED back to group home. Stable through ED course. Patient Progress  Patient progress: stable      Procedures    CRITICAL CARE TIME   Total Critical Care time was 0 minutes, excluding separately reportable procedures. There was a high probability of clinically significant/life threatening deterioration in the patient's condition which required my urgent intervention. FINAL IMPRESSION      1.  Problem with Ha catheter, initial encounter [facility])          DISPOSITION/PLAN   DISPOSITION Decision To Discharge 07/10/2020 10:08:05 PM      (Please note that portions of this note were completed with a voice recognition program.  Efforts were made to edit the dictations but occasionally words are mis-transcribed.)    Adama Lange MD (electronically signed)  Attending Emergency Physician        Adama Lange MD  07/10/20 5995

## 2020-07-11 NOTE — ED TRIAGE NOTES
Patient arrived to ER via EMS from [facility] in [place]. His care giver starts that his gayle catheter got stuck on something and came out and he just needs it replaced then he can go back to [facility]. The patient doesn't seem to be in any pain but is non-verbal. There is no bleeding or discharge coming from penis. Resp even and unlabored. Skin warm, dry and intact. He is alert but non-verbal. Patients care giver at bedside.

## 2020-07-11 NOTE — ED NOTES
Bed: 07  Expected date: 7/10/20  Expected time: 9:27 PM  Means of arrival: [facility]  Comments:  49m from group home. Catheter issues. 135/75 60 nsr. 18 resp. 100% RA.       Myron Wells, RN  07/10/20 3746

## 2020-09-03 RX ORDER — LORATADINE 10 MG/1
TABLET ORAL
Qty: 31 TABLET | Refills: 0 | Status: SHIPPED | OUTPATIENT
Start: 2020-09-03 | End: 2020-09-17

## 2020-09-11 ENCOUNTER — APPOINTMENT (OUTPATIENT)
Dept: GENERAL RADIOLOGY | Age: 70
End: 2020-09-11
Payer: MEDICAID

## 2020-09-11 ENCOUNTER — HOSPITAL ENCOUNTER (EMERGENCY)
Age: 70
Discharge: HOME | End: 2020-09-11
Payer: MEDICAID

## 2020-09-11 VITALS
WEIGHT: 155 LBS | TEMPERATURE: 97.5 F | RESPIRATION RATE: 18 BRPM | SYSTOLIC BLOOD PRESSURE: 144 MMHG | DIASTOLIC BLOOD PRESSURE: 76 MMHG | HEIGHT: 70 IN | OXYGEN SATURATION: 100 % | BODY MASS INDEX: 22.19 KG/M2 | HEART RATE: 76 BPM

## 2020-09-11 LAB
ALBUMIN SERPL-MCNC: 3.1 G/DL (ref 3.5–4.6)
ALP BLD-CCNC: 185 U/L (ref 35–104)
ALT SERPL-CCNC: 16 U/L (ref 0–41)
ANION GAP SERPL CALCULATED.3IONS-SCNC: 12 MEQ/L (ref 9–15)
AST SERPL-CCNC: 36 U/L (ref 0–40)
BASOPHILS ABSOLUTE: 0.1 K/UL (ref 0–0.2)
BASOPHILS RELATIVE PERCENT: 0.4 %
BILIRUB SERPL-MCNC: <0.2 MG/DL (ref 0.2–0.7)
BUN BLDV-MCNC: 14 MG/DL (ref 8–23)
CALCIUM SERPL-MCNC: 9.4 MG/DL (ref 8.5–9.9)
CHLORIDE BLD-SCNC: 99 MEQ/L (ref 95–107)
CO2: 29 MEQ/L (ref 20–31)
CREAT SERPL-MCNC: 0.27 MG/DL (ref 0.7–1.2)
EOSINOPHILS ABSOLUTE: 0.1 K/UL (ref 0–0.7)
EOSINOPHILS RELATIVE PERCENT: 0.6 %
GFR AFRICAN AMERICAN: >60
GFR NON-AFRICAN AMERICAN: >60
GLOBULIN: 4.3 G/DL (ref 2.3–3.5)
GLUCOSE BLD-MCNC: 141 MG/DL (ref 70–99)
HCT VFR BLD CALC: 37.3 % (ref 42–52)
HEMOGLOBIN: 12.5 G/DL (ref 14–18)
LYMPHOCYTES ABSOLUTE: 1.5 K/UL (ref 1–4.8)
LYMPHOCYTES RELATIVE PERCENT: 12.2 %
MCH RBC QN AUTO: 30.1 PG (ref 27–31.3)
MCHC RBC AUTO-ENTMCNC: 33.5 % (ref 33–37)
MCV RBC AUTO: 89.7 FL (ref 80–100)
MONOCYTES ABSOLUTE: 0.9 K/UL (ref 0.2–0.8)
MONOCYTES RELATIVE PERCENT: 7.2 %
NEUTROPHILS ABSOLUTE: 9.5 K/UL (ref 1.4–6.5)
NEUTROPHILS RELATIVE PERCENT: 79.6 %
PDW BLD-RTO: 14.7 % (ref 11.5–14.5)
PLATELET # BLD: 337 K/UL (ref 130–400)
POTASSIUM SERPL-SCNC: 4.3 MEQ/L (ref 3.4–4.9)
RBC # BLD: 4.16 M/UL (ref 4.7–6.1)
SODIUM BLD-SCNC: 140 MEQ/L (ref 135–144)
TOTAL PROTEIN: 7.4 G/DL (ref 6.3–8)
WBC # BLD: 12 K/UL (ref 4.8–10.8)

## 2020-09-11 PROCEDURE — 99283 EMERGENCY DEPT VISIT LOW MDM: CPT

## 2020-09-11 PROCEDURE — 80053 COMPREHEN METABOLIC PANEL: CPT

## 2020-09-11 PROCEDURE — 72220 X-RAY EXAM SACRUM TAILBONE: CPT

## 2020-09-11 PROCEDURE — 87040 BLOOD CULTURE FOR BACTERIA: CPT

## 2020-09-11 PROCEDURE — 85025 COMPLETE CBC W/AUTO DIFF WBC: CPT

## 2020-09-11 PROCEDURE — 36415 COLL VENOUS BLD VENIPUNCTURE: CPT

## 2020-09-11 RX ORDER — SULFAMETHOXAZOLE AND TRIMETHOPRIM 800; 160 MG/1; MG/1
1 TABLET ORAL 2 TIMES DAILY
Qty: 14 TABLET | Refills: 0 | Status: SHIPPED | OUTPATIENT
Start: 2020-09-11 | End: 2020-09-17 | Stop reason: ALTCHOICE

## 2020-09-11 ASSESSMENT — ENCOUNTER SYMPTOMS
COUGH: 0
SHORTNESS OF BREATH: 0
EYE DISCHARGE: 0
WHEEZING: 0
VOMITING: 0
SORE THROAT: 0
STRIDOR: 0
DIARRHEA: 0
RHINORRHEA: 0
CONSTIPATION: 0
ABDOMINAL DISTENTION: 0
COLOR CHANGE: 0
ABDOMINAL PAIN: 0

## 2020-09-11 NOTE — ED NOTES
This RN spoke to RN at the NH, Per report the pt had a skin tear noted to lower back in July. Pt has had wound care 3 times a week for \"about 2 weeks\" by  \"home care\". Wet to dry dressing.   Today, home health staff noted that wound worsening and residential home staff were told to send pt to ed for \"possible debridement\"  Mal Katharine made aware of updated information for residential staff     Carey Dempsey RN  09/11/20 [facility], RN  09/11/20 [address] [place] [address], RN  09/11/20 5497

## 2020-09-11 NOTE — ED TRIAGE NOTES
Pt comes from [facility] group home with a pressure ulcer to his coccyx area. Pt denies pain.   Pt does have mrdd but cooperative

## 2020-09-11 NOTE — ED PROVIDER NOTES
[address] [facility] ED  eMERGENCY dEPARTMENT eNCOUnter      Pt Name: Carmel Damon  MRN: [id number]  [place] 1950  Date of evaluation: 9/11/2020  Provider: Jaswinder Marcano PA-C    CHIEF COMPLAINT       Chief Complaint   Patient presents with    Wound Check     pt from group home with pressure ulcer to coccyx         HISTORY OF PRESENT ILLNESS   (Location/Symptom, Timing/Onset,Context/Setting, Quality, Duration, Modifying Factors, Severity)  Note limiting factors. Carmel Damon is a 81 y.o. male who presents to the emergency department patient sent from group home for decubitus ulceration and gluteus. HPI    NursingNotes were reviewed. REVIEW OF SYSTEMS    (2-9 systems for level 4, 10 or more for level 5)     Review of Systems   Constitutional: Negative for activity change and appetite change. HENT: Negative for congestion, ear discharge, ear pain, nosebleeds, rhinorrhea and sore throat. Eyes: Negative for discharge. Respiratory: Negative for cough, shortness of breath, wheezing and stridor. Cardiovascular: Negative for chest pain, palpitations and leg swelling. Gastrointestinal: Negative for abdominal distention, abdominal pain, constipation, diarrhea and vomiting. Genitourinary: Negative for difficulty urinating, dysuria, flank pain and frequency. Musculoskeletal: Negative for arthralgias. Skin: Negative for color change, pallor, rash and wound. Decubitus ulceration superior gluteal fold. Neurological: Negative for dizziness, tremors, syncope, weakness, numbness and headaches. Psychiatric/Behavioral: Negative for agitation and confusion. Except as noted above the remainder of the review of systems was reviewed and negative.        PAST MEDICAL HISTORY     Past Medical History:   Diagnosis Date    Anemia     Chronic indwelling Ha catheter     Chronic UTI     Chronic UTI     Dermatitis     DM (diabetes mellitus) [facility]     DM type 2 (diabetes mellitus, type 2) ([facility] 10/29/2013    Eczema     Gastric polyp     GERD (gastroesophageal reflux disease)     H/O bone density study 9.2.11    lumbar spine, lt femoral neck osteopenia, rt femoral neck normal    High blood pressure     HTN (hypertension)     Hypokalemia     Hypovitaminosis D     VANIA (iron deficiency anemia)     MR (mental retardation)     Neurogenic dysfunction of the urinary bladder 1/22/2019    Osteopenia     Seizure disorder (HCC)     Wound of back          SURGICALHISTORY       Past Surgical History:   Procedure Laterality Date    COLONOSCOPY  8.19.11    negative    UPPER GASTROINTESTINAL ENDOSCOPY  8.19.11    by Dr. Radha Veliz 11/7/2019    EGD performed by Lonnie Grier MD at [address] [place] [address]       Previous Medications    ACETAMINOPHEN (TYLENOL) 325 MG TABLET    Take 325 mg by mouth daily    AMLODIPINE (NORVASC) 5 MG TABLET    Take 5 mg by mouth daily    AMMONIUM LACTATE (LAC-HYDRIN) 12 % LOTION    Apply topically as needed for Dry Skin Apply topically as needed. ASPIRIN 81 MG CHEWABLE TABLET    Take 81 mg by mouth daily    ATORVASTATIN (LIPITOR) 10 MG TABLET    Take 10 mg by mouth daily    BUSPIRONE (BUSPAR) 15 MG TABLET    Take 15 mg by mouth 3 times daily    CALCIUM CARBONATE (OSCAL) 500 MG TABS TABLET    Take 500 mg by mouth 2 times daily    CHOLECALCIFEROL (VITAMIN D3) 2000 UNITS CAPS    Take by mouth Daily with lunch    CLONIDINE (CATAPRES) 0.1 MG TABLET    Take 0.1 mg by mouth 3 times daily    CRANBERRY 500 MG CAPS    Take 500 mg by mouth 2 times daily    ELASTIC BANDAGES & SUPPORTS (T.E.D. KNEE LENGTH/M-REGULAR) MISC    1 set of MINERVA stockings. Apply daily while upright in wheelchair as needed for dependent edema control.   Pharmacy/ DME may adjust size as appropriate with corrected measurement    FERROUS SULFATE 325 (65 FE) MG TABLET    Take 325 mg by mouth 2 times daily    FOLIC ACID (FOLVITE) 1 MG TABLET    Take 1 mg by mouth daily    JANUVIA 100 MG TABLET    TAKE ONE (1) TABLET BY MOUTH ONCE (1) DAILY    LANCET DEVICE MISC    1 Device by Does not apply route once for 1 dose    LEVETIRACETAM (KEPPRA) 750 MG TABLET    Take 750 mg by mouth 2 times daily    LISINOPRIL (PRINIVIL;ZESTRIL) 40 MG TABLET    Take 40 mg by mouth 2 times daily    LOPERAMIDE (IMODIUM) 2 MG CAPSULE    Take 2 mg by mouth 4 times daily as needed for Diarrhea    LORATADINE (CLARITIN) 10 MG TABLET    TAKE ONE (1) TABLET BY MOUTH ONCE (1) DAILY (PRURITIC DERMATITUS)    LORATADINE (CLARITIN) 10 MG TABLET    TAKE ONE (1) TABLET BY MOUTH ONCE (1) DAILY (PRURITIC DERMATITUS)    METFORMIN (GLUCOPHAGE) 500 MG TABLET    Take 750 mg by mouth 2 times daily (with meals)    [facility] DEV[facility]) MISC    Manual wheel chair with foot petals  A50.49    MULTIPLE VITAMIN (MULTIVITAMINS PO)    Take by mouth daily    NA SULFATE-K SULFATE-MG SULF 17.5-3.13-1.6 GM/177ML SOLN    As directed    ONDANSETRON (ZOFRAN) 4 MG TABLET    Take 4 mg by mouth every 6 hours as needed for Nausea or Vomiting    PANTOPRAZOLE (PROTONIX) 40 MG TABLET    Take 40 mg by mouth daily    PIOGLITAZONE (ACTOS) 30 MG TABLET    Take 45 mg by mouth daily     PROMETHAZINE (PHENERGAN) 25 MG SUPPOSITORY    Place 25 mg rectally every 6 hours as needed for Nausea    PYRIDOXINE HCL (VITAMIN B-6) 50 MG TABLET    Take 25 mg by mouth daily    SERTRALINE (ZOLOFT) 100 MG TABLET    Take 100 mg by mouth daily    SITAGLIPTIN (JANUVIA) 100 MG TABLET    TAKE ONE (1) TABLET BY MOUTH ONCE (1) DAILY       ALLERGIES     Patient has no known allergies. FAMILY HISTORY     History reviewed. No pertinent family history.        SOCIAL HISTORY       Social History     Socioeconomic History    Marital status: Single     Spouse name: None    Number of children: None    Years of education: None    Highest education level: None   Occupational History    None   Social Needs    Financial resource strain: None    Food insecurity Worry: None     Inability: None    Transportation needs     Medical: None     Non-medical: None   Tobacco Use    Smoking status: Never Smoker    Smokeless tobacco: Never Used   Substance and Sexual Activity    Alcohol use: No    Drug use: No    Sexual activity: Never   Lifestyle    Physical activity     Days per week: None     Minutes per session: None    Stress: None   Relationships    Social connections     Talks on phone: None     Gets together: None     Attends Amish service: None     Active member of club or organization: None     Attends meetings of clubs or organizations: None     Relationship status: None    Intimate partner violence     Fear of current or ex partner: None     Emotionally abused: None     Physically abused: None     Forced sexual activity: None   Other Topics Concern    None   Social History Narrative    None       SCREENINGS      @FLOW([id number])@      PHYSICAL EXAM    (up to 7 for level 4, 8 or more for level 5)     ED Triage Vitals [09/11/20 1252]   BP Temp Temp Source Pulse Resp SpO2 Height Weight   (!) 150/79 96.9 °F (36.1 °C) Oral 68 18 100 % 5' 10\" (1.778 m) 155 lb (70.3 kg)       Physical Exam  Vitals signs and nursing note reviewed. Constitutional:       General: He is not in acute distress. Appearance: He is well-developed. He is not ill-appearing, toxic-appearing or diaphoretic. HENT:      Head: Normocephalic. Right Ear: Tympanic membrane normal.      Left Ear: Tympanic membrane normal.      Nose: Nose normal. No congestion. Mouth/Throat:      Mouth: Mucous membranes are moist.      Pharynx: No oropharyngeal exudate or posterior oropharyngeal erythema. Eyes:      Extraocular Movements: Extraocular movements intact. Conjunctiva/sclera: Conjunctivae normal.      Pupils: Pupils are equal, round, and reactive to light. Neck:      Musculoskeletal: Normal range of motion and neck supple. No neck rigidity. Vascular: No JVD.       Trachea: No tracheal deviation. Cardiovascular:      Rate and Rhythm: Normal rate. Pulses: Normal pulses. Heart sounds: Normal heart sounds. No murmur. No friction rub. No gallop. Pulmonary:      Effort: Pulmonary effort is normal. No tachypnea, accessory muscle usage, respiratory distress or retractions. Breath sounds: No stridor. No wheezing, rhonchi or rales. Chest:      Chest wall: No tenderness. Abdominal:      General: Abdomen is flat. Bowel sounds are normal. There is no distension or abdominal bruit. Palpations: There is no shifting dullness, fluid wave, hepatomegaly, splenomegaly, mass or pulsatile mass. Tenderness: There is no abdominal tenderness. There is no right CVA tenderness, left CVA tenderness, guarding or rebound. Negative signs include Pierre's sign, Rovsing's sign and McBurney's sign. Musculoskeletal:         General: No deformity. Skin:     General: Skin is warm and dry. Capillary Refill: Capillary refill takes less than 2 seconds. Coloration: Skin is not jaundiced. Comments: Approximately 2 cm area of pressure ulceration noted to superior gluteal fold, there is no significant drainage or discharge, it is fairly superficial at this point, there is no signs of abscess. Neurological:      General: No focal deficit present. Mental Status: He is alert and oriented to person, place, and time. Mental status is at baseline. Cranial Nerves: No cranial nerve deficit. Sensory: No sensory deficit. Motor: No weakness.       Coordination: Coordination normal.   Psychiatric:         Mood and Affect: Mood normal.         DIAGNOSTIC RESULTS     EKG: All EKG's are interpreted by the Emergency Department Physician who either signs or Co-signsthis chart in the absence of a cardiologist.        RADIOLOGY:   Non-plain filmimages such as CT, Ultrasound and MRI are read by the radiologist. Plain radiographic images are visualized and preliminarily interpreted by the emergency physician with the below findings:    X-ray of the sacral coccyx region shows no signs for deep tissue penetration, no free air. No signs of osteomyelitis or bony destruction. Interpretation per the Radiologist below, if available at the time ofthis note:    XR SACRUM COCCYX (MIN 2 VIEWS)    (Results Pending)         ED BEDSIDE ULTRASOUND:   Performed by ED Physician - none    LABS:  Labs Reviewed   COMPREHENSIVE METABOLIC PANEL - Abnormal; Notable for the following components:       Result Value    Glucose 141 (*)     CREATININE 0.27 (*)     Alb 3.1 (*)     Alkaline Phosphatase 185 (*)     Globulin 4.3 (*)     All other components within normal limits   CBC WITH AUTO DIFFERENTIAL - Abnormal; Notable for the following components:    WBC 12.0 (*)     RBC 4.16 (*)     Hemoglobin 12.5 (*)     Hematocrit 37.3 (*)     RDW 14.7 (*)     Neutrophils Absolute 9.5 (*)     Monocytes Absolute 0.9 (*)     All other components within normal limits   CULTURE, BLOOD 1   CULTURE, BLOOD 2       All other labs were within normal range or not returned as of this dictation. EMERGENCY DEPARTMENT COURSE and DIFFERENTIAL DIAGNOSIS/MDM:   Vitals:    Vitals:    09/11/20 1252 09/11/20 1453   BP: (!) 150/79 (!) 144/76   Pulse: 68 76   Resp: 18 18   Temp: 96.9 °F (36.1 °C) 97.5 °F (36.4 °C)   TempSrc: Oral Oral   SpO2: 100% 100%   Weight: 155 lb (70.3 kg)    Height: 5' 10\" (1.778 m)           MDM  Number of Diagnoses or Management Options  Cellulitis of buttock:   Pressure injury of buttock, stage 2, unspecified laterality [facility]):   Diagnosis management comments: Patient presented to ED with a complaint of pressure ulcer to his sacral region which is been ongoing for approximately 1 and half months, they have a visiting physician that comes in and does home wound care and this is been ongoing with wet-to-dry dressings.   Nursing staff today felt that the wound was presently getting worse instead of better, and patient was sent to the emergency department for further evaluation. There was no significant change in size, there is no drainage, there is no fever, there is no worsening pain, according to staff at [facility]. But just felt that the appearance was worse than what it had been. There is a clean dressing on the wound on patient's arrival, it looks fairly superficial.  There is no drainage or discharge. Blood work and cultures were obtained in this patient, he does have a white count of 12,000, x-ray of the sacral region was also taken, there is no significant signs of soft tissue swelling, there is no free air, there is no signs of bony breakdown or osteomyelitis. Patient be discharged with a prescription for Bactrim, and advised to follow-up through the [facility] wound care center for further evaluation and management. Staff is advised there is any worsening or change symptoms return to the ER. CRITICAL CARE TIME   Total Critical Care time was 0 minutes, excluding separately reportableprocedures. There was a high probability of clinicallysignificant/life threatening deterioration in the patient's condition which required my urgent intervention. CONSULTS:  None    PROCEDURES:  Unless otherwise noted below, none     Procedures    FINAL IMPRESSION      1. Pressure injury of buttock, stage 2, unspecified laterality ([facility] [address]    2.  Cellulitis of buttock          DISPOSITION/PLAN   DISPOSITION Decision To Discharge 09/11/2020 02:49:28 PM      PATIENT REFERRED TO:  Luna Darby MD  [address] [place] [address]  [phone]    In 3 days      [address]  [phone]  In 3 days        DISCHARGE MEDICATIONS:  New Prescriptions    SULFAMETHOXAZOLE-TRIMETHOPRIM (BACTRIM DS) 800-160 MG PER TABLET    Take 1 tablet by mouth 2 times daily for 7 days          (Please note that portions of this note were completed with a voice recognition program.  Efforts were made to edit the dictations but occasionally words are mis-transcribed.)    Elmer Lawson PA-C (electronically signed)  Attending Emergency Physician         Elmer Lawson PA-C  09/11/20 2416

## 2020-09-16 LAB
BLOOD CULTURE, ROUTINE: NORMAL
CULTURE, BLOOD 2: NORMAL

## 2020-09-17 ENCOUNTER — HOSPITAL ENCOUNTER (OUTPATIENT)
Dept: WOUND CARE | Age: 70
Discharge: HOME | End: 2020-09-17
Payer: MEDICAID

## 2020-09-17 VITALS
SYSTOLIC BLOOD PRESSURE: 124 MMHG | RESPIRATION RATE: 16 BRPM | TEMPERATURE: 97.2 F | HEART RATE: 77 BPM | DIASTOLIC BLOOD PRESSURE: 67 MMHG

## 2020-09-17 PROBLEM — L98.429: Status: ACTIVE | Noted: 2020-09-17

## 2020-09-17 PROBLEM — L98.491 ULCER OF PERIANAL AREA, LIMITED TO BREAKDOWN OF SKIN (HCC): Status: ACTIVE | Noted: 2020-09-17

## 2020-09-17 PROCEDURE — 11042 DBRDMT SUBQ TIS 1ST 20SQCM/<: CPT

## 2020-09-17 PROCEDURE — 99214 OFFICE O/P EST MOD 30 MIN: CPT

## 2020-09-17 PROCEDURE — 11042 DBRDMT SUBQ TIS 1ST 20SQCM/<: CPT | Performed by: NURSE PRACTITIONER

## 2020-09-17 RX ORDER — LANOLIN ALCOHOL/MO/W.PET/CERES
1000 CREAM (GRAM) TOPICAL DAILY
COMMUNITY

## 2020-09-17 RX ORDER — MAGNESIUM HYDROXIDE 1200 MG/15ML
LIQUID ORAL
Qty: 1000 ML | Refills: 1 | Status: SHIPPED | OUTPATIENT
Start: 2020-09-17 | End: 2020-09-24

## 2020-09-17 NOTE — PROGRESS NOTES
Pressure ulcer of coccygeal region, unstageable ([facility] 09/17/2020    Seizure disorder ([facility]     Wound of back        PAST SURGICAL HISTORY    Past Surgical History:   Procedure Laterality Date    COLONOSCOPY  8.19.11    negative    UPPER GASTROINTESTINAL ENDOSCOPY  8.19.11    by Dr. Annmarie Bridges N/A 11/7/2019    EGD performed by Jono Shelton MD at [address] [place] [address] Rd    History reviewed. No pertinent family history. SOCIAL HISTORY    Social History     Tobacco Use    Smoking status: Never Smoker    Smokeless tobacco: Never Used   Substance Use Topics    Alcohol use: No    Drug use: No       ALLERGIES    No Known Allergies    MEDICATIONS    Current Outpatient Medications on File Prior to Encounter   Medication Sig Dispense Refill    vitamin B-12 (CYANOCOBALAMIN) 1000 MCG tablet Take 1,000 mcg by mouth daily      loratadine (CLARITIN) 10 MG tablet TAKE ONE (1) TABLET BY MOUTH ONCE (1) DAILY (PRURITIC DERMATITUS) 30 tablet 0    JANUVIA 100 MG tablet TAKE ONE (1) TABLET BY MOUTH ONCE (1) DAILY 30 tablet 0    SITagliptin (JANUVIA) 100 MG tablet TAKE ONE (1) TABLET BY MOUTH ONCE (1) DAILY 31 tablet 0    loperamide (IMODIUM) 2 MG capsule Take 2 mg by mouth 4 times daily as needed for Diarrhea      Na Sulfate-K Sulfate-Mg Sulf 17.5-3.13-1.6 GM/177ML SOLN As directed 1 Bottle 0    Lancet Device MISC 1 Device by Does not apply route once for 1 dose 1 Device 0    ammonium lactate (LAC-HYDRIN) 12 % lotion Apply topically as needed for Dry Skin Apply topically as needed.  aspirin 81 MG chewable tablet Take 81 mg by mouth daily      folic acid (FOLVITE) 1 MG tablet Take 1 mg by mouth daily      Mis.  Devices [facility]) MISC Manual wheel chair with foot petals  A50.49 1 each 0    amLODIPine (NORVASC) 5 MG tablet Take 5 mg by mouth daily      acetaminophen (TYLENOL) 325 MG tablet Take 325 mg by mouth daily      atorvastatin (LIPITOR) 10 MG tablet Take 10 mg by mouth daily      busPIRone (BUSPAR) 15 MG tablet Take 15 mg by mouth 3 times daily      cloNIDine (CATAPRES) 0.1 MG tablet Take 0.1 mg by mouth 3 times daily      Cranberry 500 MG CAPS Take 500 mg by mouth 2 times daily      ferrous sulfate 325 (65 Fe) MG tablet Take 325 mg by mouth 2 times daily      levETIRAcetam (KEPPRA) 750 MG tablet Take 750 mg by mouth 2 times daily      lisinopril (PRINIVIL;ZESTRIL) 40 MG tablet Take 40 mg by mouth 2 times daily      metFORMIN (GLUCOPHAGE) 500 MG tablet Take 750 mg by mouth 2 times daily (with meals)      Multiple Vitamin (MULTIVITAMINS PO) Take by mouth daily      calcium carbonate (OSCAL) 500 MG TABS tablet Take 500 mg by mouth 2 times daily      pantoprazole (PROTONIX) 40 MG tablet Take 40 mg by mouth daily      pioglitazone (ACTOS) 30 MG tablet Take 45 mg by mouth daily       sertraline (ZOLOFT) 100 MG tablet Take 100 mg by mouth daily      Pyridoxine HCl (VITAMIN B-6) 50 MG tablet Take 25 mg by mouth daily      Cholecalciferol (VITAMIN D3) 2000 units CAPS Take by mouth Daily with lunch      ondansetron (ZOFRAN) 4 MG tablet Take 4 mg by mouth every 6 hours as needed for Nausea or Vomiting      promethazine (PHENERGAN) 25 MG suppository Place 25 mg rectally every 6 hours as needed for Nausea      Elastic Bandages & Supports (T.E.D. KNEE LENGTH/M-REGULAR) MISC 1 set of MINERVA stockings. Apply daily while upright in wheelchair as needed for dependent edema control. Pharmacy/ DME may adjust size as appropriate with corrected measurement 1 each 0     No current facility-administered medications on file prior to encounter. REVIEW OF SYSTEMS    Pertinent items are noted in HPI. Objective:      /67   Pulse 77   Temp 97.2 °F (36.2 °C) (Temporal)   Resp 16     Wt Readings from Last 3 Encounters:   09/11/20 155 lb (70.3 kg)   09/18/19 155 lb (70.3 kg)   07/29/19 180 lb (81.6 kg)       PHYSICAL EXAM    Constitutional:   Well  developed.   Appears open areas daily and as needed. Compression:    Offloading Device: Gel cushion; turn side to side in bed    Other Instructions: Will order dressings ; will try to order mattress    Keep all dressings clean, dry and intact. Keep pressure off the wound(s) at all times. Follow up visit   2 Weeks  October 1, 2020 @    Please give 24 hour notice if unable to keep appointment. [phone]    If you experience any of the following, please call the Wound Care Service at  [phone] or go to the nearest emergency room. *Increase in pain *Temperature over 101 *Increase in drainage from your wound or a foul odor  *Uncontrolled swelling *Need for compression bandage changes due to slippage, breakthrough drainage       PLEASE NOTE: IF YOU ARE UNABLE TO OBTAIN WOUND SUPPLIES, CONTINUE TO USE THE SUPPLIES YOU HAVE AVAILABLE UNTIL YOU ARE ABLE TO REACH US.  IT IS MOST IMPORTANT TO KEEP THE WOUND COVERED AT ALL TIMES  Electronically signed by WILI Ying NP on 9/17/2020 at 11:06 AM                    Electronically signed by WILI Ying NP on 9/17/2020 at 11:12 AM

## 2020-09-17 NOTE — PLAN OF CARE
Problem: Wound:  Goal: Will show signs of wound healing; wound closure and no evidence of infection  Outcome: Ongoing     Problem: Pressure Ulcer:  Goal: Signs of wound healing will improve  Outcome: Ongoing  Goal: Absence of new pressure ulcer  Outcome: Ongoing

## 2020-09-17 NOTE — PLAN OF CARE
[address]:     [facility] [id number] [address] p: [phone] f: [phone]     Ordering Center:     [address] [place] [id number]  [phone]  WOUND CARE [phone]  [address] [place] [address] NUMBER [phone]    Patient Information:      Oneal Dacosta  [phone]   : 1950  AGE: 81 y.o. GENDER: male   TODAYS DATE:  2020    Insurance:      PRIMARY INSURANCE:  Plan: MEDICAID [facility] DEPT OF JOB  Coverage: [place]  Effective Date: 2015  [id number] - (Medicaid)    SECONDARY INSURANCE:  Plan:   Coverage:   Effective Date:   [unfilled]    [unfilled]   [unfilled]     Patient Wound Information:      Problem List Items Addressed This Visit     Chronic ulcer of sacral region, with unspecified severity ([address]    Ulcer of perianal area, limited to breakdown of skin ([address]          WOUNDS REQUIRING DRESSING SUPPLIES:     Wound 20 Coccyx #1 (Active)   Wound Image   20 1046   Wound Pressure Unstageable 20 1046   Wound Cleansed Rinsed/Irrigated with saline 20 1046   Wound Length (cm) 4 cm 20 1046   Wound Width (cm) 5 cm 20 1046   Wound Depth (cm) 0.2 cm 20 1046   Wound Surface Area (cm^2) 20 cm^2 20 1046   Wound Volume (cm^3) 4 cm^3 20 1046   Wound Assessment Black; Yellow; Tan 20 1046   Drainage Description Ladd;Yellow 20 1046   Odor None 20 1046   Sangeetha-wound Assessment Intact 20 1046   Number of days: 0          Supplies Requested :      WOUND #: 1   PRIMARY DRESSING:  None   Cover and Secure with: 4X4 gauze pad     FREQUENCY OF DRESSING CHANGES:  Daily       ADDITIONAL ITEMS:  [] Gloves Small  [x] Gloves Medium [] Gloves Large [] Gloves XLarge  [] Tape 1\" [] Tape 2\" [] Tape 3\"  [x] Medipore Tape  [x] Saline  [x] Skin Prep   [] Adhesive Remover   [] Cotton Tip Applicators   [] Other:    Patient Wound(s) Debrided: [x] Yes   [] No    Debribement Type: epidermis    Debridement Date: 9/17/2020    Patient currently being seen by Home Health: [] Yes   [x] No    Duration for needed supplies:  []15  [x]30  []60  []90 Days    Provider Information:      PROVIDER'S NAME: JAVIER Mello NPI   [id number]

## 2020-10-01 ENCOUNTER — HOSPITAL ENCOUNTER (OUTPATIENT)
Dept: WOUND CARE | Age: 70
Discharge: HOME | End: 2020-10-01
Payer: MEDICAID

## 2020-10-01 VITALS
SYSTOLIC BLOOD PRESSURE: 147 MMHG | DIASTOLIC BLOOD PRESSURE: 71 MMHG | RESPIRATION RATE: 16 BRPM | HEART RATE: 70 BPM | TEMPERATURE: 97.7 F

## 2020-10-01 PROCEDURE — 11042 DBRDMT SUBQ TIS 1ST 20SQCM/<: CPT | Performed by: NURSE PRACTITIONER

## 2020-10-01 PROCEDURE — 11042 DBRDMT SUBQ TIS 1ST 20SQCM/<: CPT

## 2020-10-01 PROCEDURE — 6370000000 HC RX 637 (ALT 250 FOR IP): Performed by: NURSE PRACTITIONER

## 2020-10-01 RX ORDER — LIDOCAINE 50 MG/G
OINTMENT TOPICAL ONCE
Status: COMPLETED | OUTPATIENT
Start: 2020-10-01 | End: 2020-10-01

## 2020-10-01 RX ADMIN — LIDOCAINE: 50 OINTMENT TOPICAL at 11:25

## 2020-10-01 NOTE — PROGRESS NOTES
[facility]                                                   Progress Note and Procedure Note      Stephani Bourne  MEDICAL RECORD NUMBER:  [id number]  AGE: 81 y.o. GENDER: male  : 1950  EPISODE DATE:  10/1/2020    Subjective:     Chief Complaint   Patient presents with    Wound Check     coccyx         HISTORY of PRESENT ILLNESS HPI     Stephani Bourne is a 81 y.o. male who presents today for wound/ulcer evaluation. History of Wound Context: follow up for unstagable sacral wound, covered with slough/necrotic tissue at initial exam. Patient is a resident of an assisted living facility-with speech/developmental delays. At last appointment ordered pressure redistributing mattress for bed, per  the mattress has not been approved/sent yet- Uuing gel cushion for w/c. Wound/Ulcer Pain Timing/Severity: intermittent  Quality of pain: tender as evidenced by increased vocalizations during all care   Severity:  2 / 10   Modifying Factors: Pain worsens with care-subsides after care completed.   Associated Signs/Symptoms: drainage    Ulcer Identification:  Ulcer Type: pressure  Contributing Factors: diabetes, chronic pressure, decreased mobility, shear force, incontinence of stool and incontinence of urine    Wound: N/A        PAST MEDICAL HISTORY        Diagnosis Date    Anemia     Chronic indwelling Ha catheter     Chronic UTI     Chronic UTI     Dermatitis     DM [facility]diabetes mellitus) [facility]     DM type 2 (diabetes mellitus, type 2) ([facility] 10/29/2013    Eczema     Gastric polyp     GERD (gastroesophageal reflux disease)     H/O bone density study 9.2.11    lumbar spine, lt femoral neck osteopenia, rt femoral neck normal    High blood pressure     HTN (hypertension)     Hypokalemia     Hypovitaminosis D     VANIA (iron deficiency anemia)     MR (mental retardation)     Neurogenic dysfunction of the urinary bladder 2019    Osteopenia     Pressure ulcer of coccygeal region, unstageable [facility]) 09/17/2020    Seizure disorder ([address]     Wound of back        PAST SURGICAL HISTORY    Past Surgical History:   Procedure Laterality Date    COLONOSCOPY  8.19.11    negative    UPPER GASTROINTESTINAL ENDOSCOPY  8.19.11    by Dr. Ryne Bingham N/A 11/7/2019    EGD performed by Alise Snow MD at [address] [place] [address] Rd    History reviewed. No pertinent family history. SOCIAL HISTORY    Social History     Tobacco Use    Smoking status: Never Smoker    Smokeless tobacco: Never Used   Substance Use Topics    Alcohol use: No    Drug use: No       ALLERGIES    No Known Allergies    MEDICATIONS    Current Outpatient Medications on File Prior to Encounter   Medication Sig Dispense Refill    vitamin B-12 (CYANOCOBALAMIN) 1000 MCG tablet Take 1,000 mcg by mouth daily      loratadine (CLARITIN) 10 MG tablet TAKE ONE (1) TABLET BY MOUTH ONCE (1) DAILY (PRURITIC DERMATITUS) 30 tablet 0    JANUVIA 100 MG tablet TAKE ONE (1) TABLET BY MOUTH ONCE (1) DAILY 30 tablet 0    SITagliptin (JANUVIA) 100 MG tablet TAKE ONE (1) TABLET BY MOUTH ONCE (1) DAILY 31 tablet 0    loperamide (IMODIUM) 2 MG capsule Take 2 mg by mouth 4 times daily as needed for Diarrhea      Na Sulfate-K Sulfate-Mg Sulf 17.5-3.13-1.6 GM/177ML SOLN As directed 1 Bottle 0    Lancet Device MISC 1 Device by Does not apply route once for 1 dose 1 Device 0    ammonium lactate (LAC-HYDRIN) 12 % lotion Apply topically as needed for Dry Skin Apply topically as needed.  aspirin 81 MG chewable tablet Take 81 mg by mouth daily      folic acid (FOLVITE) 1 MG tablet Take 1 mg by mouth daily      Mis. Devices [facility] [place]) MISC Manual wheel chair with foot petals  A50.49 1 each 0    Elastic Bandages & Supports (T.E.D. KNEE LENGTH/M-REGULAR) MISC 1 set of MINERVA stockings. Apply daily while upright in wheelchair as needed for dependent edema control.   Pharmacy/ DME may adjust size as Appears neat and clean. Patient is alert and in no apparent distress. Respiratory:  Respiratory effort is easy and symmetric bilaterally. Rate is normal at rest and on room air. Vascular:   Extremities negative for pitting edema. Neurological:   Sensation is present to lower extremities. Unable to verbalize however,   Increased vocalizations during any type of care/exam.     Dermatological:  Wound description noted in wound assessment. The wound remains covered with stringy gray slough in deepest area of wound, with perimeter of wound margin covered with dense/fibrotic yellow/gray slough/necrotic tissue. Sangeetha wound non-erythematous, with several previous areas of denuded skin/partial thickness near rectum, reduced in size and appear to be healing. Psychiatric:   Patient is calm. Appropriate interactions and affect per  present at bedside, patient's vocalizations are consistent with any care provided. Assessment:      Problem List Items Addressed This Visit     None           Procedure Note  Indications:  Based on my examination of this patient's wound(s)/ulcer(s) today, debridement is required to promote healing and evaluate the wound base. Performed by: WILI Ayala NP    Consent obtained:  Yes    Time out taken:  Yes    Pain Control:   Lidocaine visocous 5%, topical 20 minutes prior to debridement. Debridement:    Using curette, scissors and forceps the wound(s)/ulcer(s) was/were sharply debrided down through and including the removal of epidermis, dermis and subcutaneous tissue.         Devitalized Tissue Debrided:  fibrin, biofilm and slough    Pre Debridement Measurements:  Are located in the [place]  Documentation Flow Sheet    Wound/Ulcer #: 1    Post Debridement Measurements:  Wound/Ulcer Descriptions are Pre Debridement except measurements:    Wound 09/17/20 Coccyx #1 (Active)   Wound Image   10/01/20 1122   Wound Pressure Unstageable 10/01/20 1122 Wound Cleansed Rinsed/Irrigated with saline 10/01/20 1122   Wound Length (cm) 3.7 cm 10/01/20 1122   Wound Width (cm) 4.7 cm 10/01/20 1122   Wound Depth (cm) 2 cm 10/01/20 1122   Wound Surface Area (cm^2) 17.39 cm^2 10/01/20 1122   Change in Wound Size % (l*w) 13.05 10/01/20 1122   Wound Volume (cm^3) 34.78 cm^3 10/01/20 1122   Wound Healing % -770 10/01/20 1122   Post-Procedure Length (cm) 3.7 cm 10/01/20 1210   Post-Procedure Width (cm) 4.7 cm 10/01/20 1210   Post-Procedure Depth (cm) 2 cm 10/01/20 1210   Post-Procedure Surface Area (cm^2) 17.39 cm^2 10/01/20 1210   Post-Procedure Volume (cm^3) 34.78 cm^3 10/01/20 1210   Wound Assessment Slough; Yellow;Pink;Red 10/01/20 1122   Drainage Amount Moderate 10/01/20 1122   Drainage Description Ladd;Yellow 10/01/20 1122   Odor Mild 10/01/20 1122   Sangeetha-wound Assessment Intact 10/01/20 1122   Number of days: 14            Percent of Wound/Ulcer Debrided: 10%    Total Surface Area Debrided:  1.739 sq cm     Diabetic/Pressure/Non Pressure Ulcers:  Ulcer: unstagable pressure ulcer      Bleeding:  None    Hemostasis Achieved:  not needed    Procedural Pain:  2  / 10     Post Procedural Pain:  0 / 10     Response to treatment:  Well tolerated by patient. Plan:     Continue with Santyl, however due to depth of wound will add gauze packing (moistened) to be inserted into wound bed, over Santyl. Discussed with CM need to have [facility] apply Santyl to perimeter of wound bed also and cover with moistened 0.9 NS dressing. Recheck in 2 weeks. Treatment Note please see attached Discharge Instructions    Written patient dismissal instructions given to patient and signed by patient or POA.          Discharge Instructions         Discharge Instructions        [address] [place] [address] and Hyperbaric Medicine   Physician Orders and Discharge Instructions  [address]  Telephone: [phone]      [facility] [phone]        NAME:  Claudia Gamez  YOB: 1950  MEDICAL RECORD NUMBER:  [id number]     Home Care/Facility:  [facility]     Wound Location:  Coccyx     Dressing orders: 1. Cleanse wound(s) with normal saline. 2. Apply a nickel thickness layer of SANTYL OINTMENT to the wound bed for         enzymatic debridement purposes. 3. Apply a moistened saline 4x4 (gauze pad) over the Santyl Ointment. Pack gauze into deep areas. 4. Cover with additional dry 4x4's or ABD  5. Change Every Day. Use Calmoseptine to small open areas daily and as needed.     Compression:     Offloading Device: Gel cushion; turn side to side in bed     Other Instructions:  will  order mattress     Keep all dressings clean, dry and intact. Keep pressure off the wound(s) at all times.      Follow up visit   2 Weeks  October 15, 2020 @     Please give 24 hour notice if unable to keep appointment. [phone]     If you experience any of the following, please call the Wound Care Service at  [phone] or go to the nearest emergency room. *Increase in pain         *Temperature over 101           *Increase in drainage from your wound or a foul odor  *Uncontrolled swelling            *Need for compression bandage changes due to slippage, breakthrough drainage       PLEASE NOTE: IF YOU ARE UNABLE TO OBTAIN WOUND SUPPLIES, CONTINUE TO USE THE SUPPLIES YOU HAVE AVAILABLE UNTIL YOU ARE ABLE TO REACH US.  IT IS MOST IMPORTANT TO KEEP THE WOUND COVERED AT ALL TIMES  Electronically signed by WILI Espinoza NP on 10/1/2020 at 1:22 PM        Electronically signed by WILI Espinoza NP on 10/1/2020 at 1:25 PM

## 2020-10-01 NOTE — PLAN OF CARE
Problem: Wound:  Goal: Will show signs of wound healing; wound closure and no evidence of infection  Description: Will show signs of wound healing; wound closure and no evidence of infection  10/1/2020 1124 by Malcolm King RN  Outcome: Ongoing  10/1/2020 1102 by Moris Zaidi RN  Outcome: Ongoing     Problem: Pressure Ulcer:  Goal: Signs of wound healing will improve  Description: Signs of wound healing will improve  10/1/2020 1124 by Malcolm King RN  Outcome: Ongoing  10/1/2020 1102 by Moris Zaidi RN  Outcome: Ongoing  Goal: Absence of new pressure ulcer  Description: Absence of new pressure ulcer  10/1/2020 1124 by Malcolm King RN  Outcome: Ongoing  10/1/2020 1102 by Moris Zaidi RN  Outcome: Ongoing

## 2020-10-01 NOTE — PLAN OF CARE
Problem: Pressure Ulcer:  Goal: Absence of new pressure ulcer  Description: Absence of new pressure ulcer  Outcome: Ongoing

## 2020-10-08 RX ORDER — SITAGLIPTIN 100 MG/1
TABLET, FILM COATED ORAL
Qty: 31 TABLET | Refills: 10 | OUTPATIENT
Start: 2020-10-08

## 2020-10-15 ENCOUNTER — HOSPITAL ENCOUNTER (OUTPATIENT)
Dept: WOUND CARE | Age: 70
Discharge: HOME | End: 2020-10-15
Payer: MEDICAID

## 2020-10-15 VITALS
TEMPERATURE: 97.5 F | RESPIRATION RATE: 16 BRPM | DIASTOLIC BLOOD PRESSURE: 77 MMHG | HEART RATE: 83 BPM | SYSTOLIC BLOOD PRESSURE: 138 MMHG

## 2020-10-15 PROBLEM — L89.601 PRESSURE INJURY OF HEEL, STAGE 1: Status: ACTIVE | Noted: 2020-10-15

## 2020-10-15 PROCEDURE — 11042 DBRDMT SUBQ TIS 1ST 20SQCM/<: CPT | Performed by: NURSE PRACTITIONER

## 2020-10-15 PROCEDURE — 11042 DBRDMT SUBQ TIS 1ST 20SQCM/<: CPT

## 2020-10-15 ASSESSMENT — PAIN DESCRIPTION - LOCATION: LOCATION: COCCYX

## 2020-10-15 ASSESSMENT — PAIN DESCRIPTION - PAIN TYPE: TYPE: ACUTE PAIN

## 2020-10-15 NOTE — PROGRESS NOTES
[facility]                                                   Progress Note and Procedure Note      Janes Bearden  MEDICAL RECORD NUMBER:  [id number]  AGE: 81 y.o. GENDER: male  : 1950  EPISODE DATE:  10/15/2020    Subjective:     Chief Complaint   Patient presents with    Wound Check         HISTORY of PRESENT ILLNESS HPI     Janes Bearden is a 81 y.o. male who presents today for wound/ulcer evaluation. History of Wound Context: follow up for unstagable sacral wound, covered with slough/necrotic tissue. Patient is a resident of an assisted living facility-with speech/developmental delays. Recently received ordered pressure redistributing mattress for bed, per  the mattress. In meantime developed area of deep tissue pressure injury to right lateral heel. Usuing gel cushion for w/c.   Wound/Ulcer Pain Timing/Severity: intermittent (per faces scale)   Severity:  2 / 10   Modifying Factors: Pain is relieved/improved with rest and following care   Associated Signs/Symptoms: drainage    Ulcer Identification:  Ulcer Type: pressure  Contributing Factors: chronic pressure, decreased mobility, shear force and incontinence of stool    Wound: N/A        PAST MEDICAL HISTORY        Diagnosis Date    Anemia     Chronic indwelling Ha catheter     Chronic UTI     Chronic UTI     Dermatitis     [facility]     DM type 2 (diabetes mellitus, type 2) ([facility] 10/29/2013    Eczema     Gastric polyp     GERD (gastroesophageal reflux disease)     H/O bone density study 9.2.11    lumbar spine, lt femoral neck osteopenia, rt femoral neck normal    High blood pressure     HTN (hypertension)     Hypokalemia     Hypovitaminosis D     VANIA (iron deficiency anemia)     MR (mental retardation)     Neurogenic dysfunction of the urinary bladder 2019    Osteopenia     Pressure ulcer of coccygeal region, unstageable ([address] 2020    Seizure disorder ([facility]     tablet Take 5 mg by mouth daily      acetaminophen (TYLENOL) 325 MG tablet Take 325 mg by mouth daily      atorvastatin (LIPITOR) 10 MG tablet Take 10 mg by mouth daily      busPIRone (BUSPAR) 15 MG tablet Take 15 mg by mouth 3 times daily      cloNIDine (CATAPRES) 0.1 MG tablet Take 0.1 mg by mouth 3 times daily      Cranberry 500 MG CAPS Take 500 mg by mouth 2 times daily      ferrous sulfate 325 (65 Fe) MG tablet Take 325 mg by mouth 2 times daily      levETIRAcetam (KEPPRA) 750 MG tablet Take 750 mg by mouth 2 times daily      lisinopril (PRINIVIL;ZESTRIL) 40 MG tablet Take 40 mg by mouth 2 times daily      metFORMIN (GLUCOPHAGE) 500 MG tablet Take 750 mg by mouth 2 times daily (with meals)      Multiple Vitamin (MULTIVITAMINS PO) Take by mouth daily      calcium carbonate (OSCAL) 500 MG TABS tablet Take 500 mg by mouth 2 times daily      pantoprazole (PROTONIX) 40 MG tablet Take 40 mg by mouth daily      pioglitazone (ACTOS) 30 MG tablet Take 45 mg by mouth daily       sertraline (ZOLOFT) 100 MG tablet Take 100 mg by mouth daily      Pyridoxine HCl (VITAMIN B-6) 50 MG tablet Take 25 mg by mouth daily      Cholecalciferol (VITAMIN D3) 2000 units CAPS Take by mouth Daily with lunch      ondansetron (ZOFRAN) 4 MG tablet Take 4 mg by mouth every 6 hours as needed for Nausea or Vomiting      promethazine (PHENERGAN) 25 MG suppository Place 25 mg rectally every 6 hours as needed for Nausea       No current facility-administered medications on file prior to encounter. REVIEW OF SYSTEMS    Pertinent items are noted in HPI. Objective:      /77   Pulse 83   Temp 97.5 °F (36.4 °C) (Temporal)   Resp 16     Wt Readings from Last 3 Encounters:   09/11/20 155 lb (70.3 kg)   09/18/19 155 lb (70.3 kg)   07/29/19 180 lb (81.6 kg)       PHYSICAL EXAM    Constitutional:   Well nourished and well developed. Appears neat and clean. Patient is alert, oriented x3, and in no apparent distress. Respiratory:  Respiratory effort is easy and symmetric bilaterally. Rate is normal at rest and on room air. Vascular:   Capillary refill is <3 sec to digits bilateral.  Extremities negative for pitting edema. Dermatological:  Wound description noted in wound assessment. The sacral wound bed is covered with tan/gray stringy slough. Wound edges have several areas of hypergranulation present. Some hyper-granulation tissue present on wound bed edges. Negative for odor, no noted purulent drainage, no surrounding erythema, or induration. Previous moisture associated skin damage to perirectal area is now healed.  present with patient reports new area of pressure to right lateral heel. Area is deep maroon, with no fluctuance, no bullae, no break in skin. Psychiatric:  Judgement and insight intact. Short and long term memory intact. No evidence of depression, anxiety, or agitation. Patient is calm, cooperative, and communicative. Appropriate interactions and affect. Assessment:      Problem List Items Addressed This Visit     None           Procedure Note  Indications:  Based on my examination of this patient's wound(s)/ulcer(s) today, debridement is required to promote healing and evaluate the wound base. Performed by: WILI Ayala NP    Consent obtained:  Yes    Time out taken:  Yes    Pain Control:   lidocaine 5%  Ointment topically applied 30 minutes prior to procedure      Debridement:Excisional Debridement    Using #15 blade scalpel and scissors the wound(s)/ulcer(s) was/were sharply debrided down through and including the removal of epidermis, dermis and subcutaneous tissue. Devitalized Tissue Debrided:  fibrin, biofilm, slough and necrotic/eschar     Hypergranulation tissue present at wound margin - applied silver nitrate to reduce.      Pre Debridement Measurements:  Are located in the [place]  Documentation Flow Sheet    Wound/Ulcer #: 1    Post Debridement Measurements:  Wound/Ulcer Descriptions are Pre Debridement except measurements:    Wound 09/17/20 Coccyx #1 (Active)   Wound Image   10/15/20 1034   Wound Etiology Pressure Unstageable 10/15/20 1034   Wound Cleansed Cleansed with saline 10/15/20 1034   Wound Length (cm) 3.8 cm 10/15/20 1034   Wound Width (cm) 4 cm 10/15/20 1034   Wound Depth (cm) 1.3 cm 10/15/20 1034   Wound Surface Area (cm^2) 15.2 cm^2 10/15/20 1034   Change in Wound Size % (l*w) 24 10/15/20 1034   Wound Volume (cm^3) 19.76 cm^3 10/15/20 1034   Wound Healing % -394 10/15/20 1034   Post-Procedure Length (cm) 3.8 cm 10/15/20 1037   Post-Procedure Width (cm) 4 cm 10/15/20 1037   Post-Procedure Depth (cm) 1.3 cm 10/15/20 1037   Post-Procedure Surface Area (cm^2) 15.2 cm^2 10/15/20 1037   Post-Procedure Volume (cm^3) 19.76 cm^3 10/15/20 1037   Distance Tunneling (cm) 1.6 cm 10/15/20 1034   Tunneling Position ___ O'Clock 1 10/15/20 1034   Undermining Starts ___ O'Clock 3 10/15/20 1034   Undermining Ends___ O'Clock 5 10/15/20 1034   Undermining Maxium Distance (cm) 1.6 10/15/20 1034   Wound Assessment Granulation tissue;Slough 10/15/20 1034   Drainage Amount Large 10/15/20 1034   Drainage Description Serosanguinous;Green 10/15/20 1034   Odor Mild 10/15/20 1034   Sangeetha-wound Assessment Fragile 10/15/20 1034   Margins Defined edges 10/15/20 1034   Wound Thickness Description not for Pressure Injury Full thickness 10/15/20 1034   Number of days: 28        Percent of Wound/Ulcer Debrided: 30%    Total Surface Area Debrided:  4.56 sq cm     Diabetic/Pressure/Non Pressure Ulcers:  Ulcer: N/A      Bleeding:  Minimal    Hemostasis Achieved:  not needed    Procedural Pain:  1  / 10     Post Procedural Pain:  0 / 10     Response to treatment:  Well tolerated by patient. Plan:   Continue as directed below. Recheck in 2 weeks. Keep heels off of bed at all times.  Wait until affected area resolved to apply pressure relieving boots, keep heels off of bed.     Treatment Note please see attached Discharge Instructions    Written patient dismissal instructions given to patient and signed by patient or POA. Discharge [address] [facility]   Physician Orders and Discharge Instructions  [facility]  [address] [facility]  [place], [address] [facility]  Telephone: [phone]      [phone]        NAME: Lana Chaparro  DATE OF BIRTH: [address] RECORD NUMBER:  [id number]     Home Care/Facility: [facility]     Wound Location:  Coccyx     Dressing orders: 1. Cleanse wound(s) with normal saline. 2. Apply a nickel thickness layer of SANTYL OINTMENT to the wound bed for         enzymatic debridement purposes. 3. Apply a moistened saline 4x4 (gauze pad) over the Santyl Ointment. Pack gauze into deep areas. 4. Cover with additional dry 4x4's or ABD  5. Change Every Day.                   Wound Location: Right Heel  1. Cleanse with mild soap and water  2. ABD to heel and wrap with Kerlix  3. Keep heel elevated off surfaces     Use Calmoseptine to amilcar-rectal to small open areas daily PRN.    Compression: No compression stocking on Right lower leg for 1 week. May use compression stocking on left lower leg.     Offloading Device: Keep heels elevated off surfaces. Gel cushion in wheelchair. Use  Low air-loss mattress for bed. . turn side to side in bed     Other Instructions:      Keep all dressings clean, dry and intact.  Keep pressure off the wound(s) at all times.      Follow up visit  2  Weeks  October 29 , 2020 @     Please give 24 hour notice if unable to keep appointment.  [phone]     If you experience any of the following, please call the Wound Care Service at  [phone] or go to the nearest emergency room.        *Increase in pain         *Temperature over 101           *Increase in drainage from your wound or a foul odor  *Uncontrolled

## 2020-10-15 NOTE — PLAN OF CARE
Problem: Wound:  Goal: Will show signs of wound healing; wound closure and no evidence of infection  Description: Will show signs of wound healing; wound closure and no evidence of infection  Outcome: Ongoing     Problem: Pressure Ulcer:  Goal: Signs of wound healing will improve  Description: Signs of wound healing will improve  Outcome: Ongoing  Goal: Absence of new pressure ulcer  Description: Absence of new pressure ulcer  Outcome: Ongoing

## 2020-10-16 RX ORDER — CALCIUM CITRATE/VITAMIN D3 200MG-6.25
TABLET ORAL
Qty: 100 STRIP | Refills: 10 | OUTPATIENT
Start: 2020-10-16

## 2020-10-29 ENCOUNTER — HOSPITAL ENCOUNTER (OUTPATIENT)
Dept: WOUND CARE | Age: 70
Discharge: HOME | End: 2020-10-29
Payer: MEDICAID

## 2020-10-29 VITALS
RESPIRATION RATE: 16 BRPM | DIASTOLIC BLOOD PRESSURE: 84 MMHG | TEMPERATURE: 98.2 F | SYSTOLIC BLOOD PRESSURE: 137 MMHG | HEART RATE: 90 BPM

## 2020-10-29 PROCEDURE — 99213 OFFICE O/P EST LOW 20 MIN: CPT

## 2020-10-29 PROCEDURE — 99213 OFFICE O/P EST LOW 20 MIN: CPT | Performed by: NURSE PRACTITIONER

## 2020-10-29 NOTE — PROGRESS NOTES
[facility]   Progress Note and Procedure Note      Bernabe Cockayne  MEDICAL RECORD NUMBER:  [id number]  AGE: 81 y.o. GENDER: male  : 1950  EPISODE DATE:  10/29/2020    Subjective:     Chief Complaint   Patient presents with    Wound Check     coccyx wound         HISTORY of PRESENT ILLNESS HPI     Bernabe Cockayne is a 81 y.o. male who presents today for wound/ulcer evaluation. History of Wound Context: recheck on non-healing pressure ulcer of sacrum, last appt had developed right heel blood blister. Today sacral wound  with right heel blister absorbed with overlying skin intact, has two small superficial partial thickness areas medial to previous blister which remain open. Using mepilex border dressing to area.    Wound/Ulcer Pain Timing/Severity: intermittent  Quality of pain: tender  Severity:  2 / 10   Modifying Factors: Pain worsens with care and subsides after completed   Associated Signs/Symptoms: drainage    Ulcer Identification:  Ulcer Type: pressure  Contributing Factors: chronic pressure, decreased mobility, shear force, incontinence of stool and incontinence of urine    Wound: N/A        PAST MEDICAL HISTORY        Diagnosis Date    Anemia     Chronic indwelling Ha catheter     Chronic UTI     Chronic UTI     Dermatitis     [facility]     DM type 2 (diabetes mellitus, type 2) ([facility] 10/29/2013    Eczema     Gastric polyp     GERD (gastroesophageal reflux disease)     H/O bone density study 9.2.11    lumbar spine, lt femoral neck osteopenia, rt femoral neck normal    High blood pressure     HTN (hypertension)     Hypokalemia     Hypovitaminosis D     VANIA (iron deficiency anemia)     MR (mental retardation)     Neurogenic dysfunction of the urinary bladder 2019    Osteopenia     Pressure ulcer of coccygeal region, unstageable ([address] 2020    Seizure disorder ([facility]     Wound of back        Problem List:    Patient Active Problem List   Diagnosis    Disorder of intellectual development    Diabetes mellitus due to underlying condition with stage 1 chronic kidney disease, without long-term current use of insulin (HCC)    Seizure disorder (HCC)    GERD (gastroesophageal reflux disease)    Chronic UTI    Osteopenia    Dermatitis    Eczema    Hypovitaminosis D    VANIA (iron deficiency anemia)    Chronic indwelling Ha catheter    Essential hypertension    Full code status    Dietary restriction    Degenerative arthritis of hip    Neurogenic dysfunction of the urinary bladder    Foot deformity    Chronic ulcer of sacral region, with unspecified severity ([address]    Ulcer of perianal area, limited to breakdown of skin ([address]    Pressure injury of heel, stage 1        PAST SURGICAL HISTORY    Past Surgical History:   Procedure Laterality Date    COLONOSCOPY  8.19.11    negative    UPPER GASTROINTESTINAL ENDOSCOPY  8.19.11    by  [id number] N/A 11/7/2019    EGD performed by Doug Marshall MD at [address] [place] [address]    History reviewed. No pertinent family history.     SOCIAL HISTORY    Social History     Tobacco Use    Smoking status: Never Smoker    Smokeless tobacco: Never Used   Substance Use Topics    Alcohol use: No    Drug use: No       ALLERGIES    No Known Allergies    MEDICATIONS    Current Outpatient Medications on File Prior to Encounter   Medication Sig Dispense Refill    vitamin B-12 (CYANOCOBALAMIN) 1000 MCG tablet Take 1,000 mcg by mouth daily      loratadine (CLARITIN) 10 MG tablet TAKE ONE (1) TABLET BY MOUTH ONCE (1) DAILY (PRURITIC DERMATITUS) 30 tablet 0    JANUVIA 100 MG tablet TAKE ONE (1) TABLET BY MOUTH ONCE (1) DAILY 30 tablet 0    SITagliptin (JANUVIA) 100 MG tablet TAKE ONE (1) TABLET BY MOUTH ONCE (1) DAILY 31 tablet 0    loperamide (IMODIUM) 2 MG capsule Take 2 mg by mouth 4 times daily as needed for Diarrhea      Na Sulfate-K Sulfate-Mg Sulf 17.5-3.13-1.6 GM/177ML SOLN As directed 1 Bottle 0    Lancet Device MISC 1 Device by Does not apply route once for 1 dose 1 Device 0    ammonium lactate (LAC-HYDRIN) 12 % lotion Apply topically as needed for Dry Skin Apply topically as needed.  aspirin 81 MG chewable tablet Take 81 mg by mouth daily      folic acid (FOLVITE) 1 MG tablet Take 1 mg by mouth daily      [facility] [place]. Devices [facility]) MISC Manual wheel chair with foot petals  A50.49 1 each 0    Elastic Bandages & Supports (T.E.D. KNEE LENGTH/M-REGULAR) MISC 1 set of MINERVA stockings. Apply daily while upright in wheelchair as needed for dependent edema control.   Pharmacy/ DME may adjust size as appropriate with corrected measurement 1 each 0    amLODIPine (NORVASC) 5 MG tablet Take 5 mg by mouth daily      acetaminophen (TYLENOL) 325 MG tablet Take 325 mg by mouth daily      atorvastatin (LIPITOR) 10 MG tablet Take 10 mg by mouth daily      busPIRone (BUSPAR) 15 MG tablet Take 15 mg by mouth 3 times daily      cloNIDine (CATAPRES) 0.1 MG tablet Take 0.1 mg by mouth 3 times daily      Cranberry 500 MG CAPS Take 500 mg by mouth 2 times daily      ferrous sulfate 325 (65 Fe) MG tablet Take 325 mg by mouth 2 times daily      levETIRAcetam (KEPPRA) 750 MG tablet Take 750 mg by mouth 2 times daily      lisinopril (PRINIVIL;ZESTRIL) 40 MG tablet Take 40 mg by mouth 2 times daily      metFORMIN (GLUCOPHAGE) 500 MG tablet Take 750 mg by mouth 2 times daily (with meals)      Multiple Vitamin (MULTIVITAMINS PO) Take by mouth daily      calcium carbonate (OSCAL) 500 MG TABS tablet Take 500 mg by mouth 2 times daily      pantoprazole (PROTONIX) 40 MG tablet Take 40 mg by mouth daily      pioglitazone (ACTOS) 30 MG tablet Take 45 mg by mouth daily       sertraline (ZOLOFT) 100 MG tablet Take 100 mg by mouth daily      Pyridoxine HCl (VITAMIN B-6) 50 MG tablet Take 25 mg by mouth daily      Cholecalciferol (VITAMIN D3) 2000 units CAPS Take by mouth Daily with lunch      ondansetron (ZOFRAN) 4 MG tablet Take 4 mg by mouth every 6 hours as needed for Nausea or Vomiting      promethazine (PHENERGAN) 25 MG suppository Place 25 mg rectally every 6 hours as needed for Nausea       No current facility-administered medications on file prior to encounter. REVIEW OF SYSTEMS    Pertinent items are noted in HPI. Objective:      /84   Pulse 90   Temp 98.2 °F (36.8 °C) (Temporal)   Resp 16     Wt Readings from Last 3 Encounters:   09/11/20 155 lb (70.3 kg)   09/18/19 155 lb (70.3 kg)   07/29/19 180 lb (81.6 kg)       PHYSICAL EXAM    Constitutional:   Well nourished and well developed. Appears neat and clean. Patient is alert, oriented x3, and in no apparent distress. Respiratory:  Respiratory effort is easy and symmetric bilaterally. Rate is normal at rest and on room air. Vascular:   Extremities positive for trace to 1+ edema. Neurological:  Gross and Light touch intact. Protective sensation intact as evidenced by increased vocalizations throughout care/exam.     Dermatological:  Wound description noted in wound assessment. Sacral wound is more granular with only small amount of slough over wound base. Some amilcar wound maceration noted, remains adherent. No odor. Undermining remains with maximum depth 2.1 cm. Discussed/reviewed wound cleansing/santyl application technique with provider. Wound overall appears improved. Psychiatric:  Patient is non-verbal, resident of long term care facility due to developmental disabilities. Normal affect today for patient, occasional vocalization during care/communication. Assessment:      Problem List Items Addressed This Visit     None           Procedure Note  Indications:  Based on my examination of this patient's wound(s)/ulcer(s) today, debridement is not required to promote healing and evaluate the wound base.     Wound 09/17/20 Coccyx #1 (Active)   Wound Image   10/29/20 1121   Wound Etiology Pressure Stage  3 10/29/20 1121   Wound Cleansed Cleansed with saline 10/29/20 1121   Wound Length (cm) 3.5 cm 10/29/20 1121   Wound Width (cm) 4 cm 10/29/20 1121   Wound Depth (cm) 2 cm 10/29/20 1121   Wound Surface Area (cm^2) 14 cm^2 10/29/20 1121   Change in Wound Size % (l*w) 30 10/29/20 1121   Wound Volume (cm^3) 28 cm^3 10/29/20 1121   Wound Healing % -600 10/29/20 1121   Post-Procedure Length (cm) 3.8 cm 10/15/20 1037   Post-Procedure Width (cm) 4 cm 10/15/20 1037   Post-Procedure Depth (cm) 1.3 cm 10/15/20 1037   Post-Procedure Surface Area (cm^2) 15.2 cm^2 10/15/20 1037   Post-Procedure Volume (cm^3) 19.76 cm^3 10/15/20 1037   Distance Tunneling (cm) 1.6 cm 10/15/20 1034   Tunneling Position ___ O'Clock 1 10/15/20 1034   Undermining Starts ___ O'Clock 8 10/29/20 1121   Undermining Ends___ O'Clock 1 10/29/20 1121   Undermining Maxium Distance (cm) 2.1 10/29/20 1121   Wound Assessment Granulation tissue;Slough 10/29/20 1121   Drainage Amount Moderate 10/29/20 1121   Drainage Description Serosanguinous 10/29/20 1121   Odor Mild 10/29/20 1121   Sangeetha-wound Assessment Fragile 10/29/20 1121   Margins Defined edges 10/29/20 1121   Wound Thickness Description not for Pressure Injury Full thickness 10/29/20 1121   Number of days: 42                  Plan:     Continue with dressing changes as below. Continue to offload heels, and may use mepilex border dressings to protect/cusihion heels prophylactically. Treatment Note please see attached Discharge Instructions    Written patient dismissal instructions given to patient and signed by patient or POA.          Discharge Instructions         Discharge Instructions               [address] [facility]   Physician Orders and Discharge [address] [place] [address]  Telephone: [phone]      [phone]        NAME: Sayra Stahl  DATE OF BIRTH:  1950  MEDICAL RECORD NUMBER:  [id number]     Home Care/Facility:  [facility]     Wound Location:  Coccyx     Dressing orders: 1. Cleanse wound(s) with normal saline. Skin prep to periwound  2. Apply a nickel thickness layer of SANTYL OINTMENT to the wound bed for         enzymatic debridement purposes. 3. Apply a moistened saline 4x4 (gauze pad) over the Santyl Ointment.  Pack gauze into deep areas. 4. Cover with additional dry 4x4's or ABD  5. Change Every Day.                   Wound Location: Right Heel  1. Cleanse with mild soap and water  2. ABD to heel and wrap with Kerlix or may use foam border dressing. Change twice weekly. 3. Keep heel elevated off surfaces      Use Calmoseptine to amilcar-rectal to small open areas daily PRN.    Compression:      Offloading Device: Keep heels elevated off surfaces. Gel cushion in wheelchair. Use  Low air-loss mattress for bed. . turn side to side in bed  Electronically signed by WILI Burciaga NP on 10/29/2020 at 11:43 AM     Other Instructions:      Keep all dressings clean, dry and intact.  Keep pressure off the wound(s) at all times.      Follow up visit  3  Weeks  November 19 , 2020 @  10:45     Please give 24 hour notice if unable to keep appointment. [phone]     If you experience any of the following, please call the Wound Care Service at  [phone] or go to the nearest emergency room.        *Increase in pain         *Temperature over 101           *Increase in drainage from your wound or a foul odor  *Uncontrolled swelling            *Need for compression bandage changes due to slippage, breakthrough drainage       PLEASE NOTE: IF YOU ARE UNABLE TO OBTAIN WOUND SUPPLIES, CONTINUE TO USE THE SUPPLIES YOU HAVE AVAILABLE UNTIL YOU ARE ABLE TO [address].  IT IS MOST IMPORTANT TO KEEP THE WOUND COVERED AT ALL TIMES          Electronically signed by WILI Burciaga NP on 10/29/2020 at 11:44 AM

## 2020-11-03 RX ORDER — LORATADINE 10 MG/1
TABLET ORAL
Qty: 90 TABLET | Refills: 0 | Status: SHIPPED | OUTPATIENT
Start: 2020-11-03

## 2020-11-19 ENCOUNTER — HOSPITAL ENCOUNTER (OUTPATIENT)
Dept: WOUND CARE | Age: 70
Discharge: HOME | End: 2020-11-19
Payer: MEDICAID

## 2020-11-19 VITALS
TEMPERATURE: 96.5 F | HEART RATE: 78 BPM | DIASTOLIC BLOOD PRESSURE: 95 MMHG | RESPIRATION RATE: 16 BRPM | SYSTOLIC BLOOD PRESSURE: 173 MMHG

## 2020-11-19 PROCEDURE — 99212 OFFICE O/P EST SF 10 MIN: CPT | Performed by: NURSE PRACTITIONER

## 2020-11-19 PROCEDURE — 99213 OFFICE O/P EST LOW 20 MIN: CPT

## 2020-11-19 NOTE — PROGRESS NOTES
Zaina Dickerson [address]   Progress Note and Procedure Note      Mitzi Holman  MEDICAL RECORD NUMBER:  [id number]  AGE: 81 y.o. GENDER: male  : 1950  EPISODE DATE:  2020    Subjective:     Chief Complaint   Patient presents with    Wound Check     coccyx         HISTORY of PRESENT ILLNESS HPI     Mitzi Holman is a 81 y.o. male who presents today for wound/ulcer evaluation. History of Wound Context: recheck on non-healing sacral pressure ulcer.    Wound/Ulcer Pain Timing/Severity: none  Quality of pain: N/A  Severity:  0 / 10   Modifying Factors: None  Associated Signs/Symptoms: none    Ulcer Identification:  Ulcer Type: pressure  Contributing Factors: chronic pressure, decreased mobility, shear force, incontinence of stool and incontinence of urine    Wound: N/A        PAST MEDICAL HISTORY        Diagnosis Date    Anemia     Chronic indwelling Ha catheter     Chronic UTI     Chronic UTI     Dermatitis     [facility]     DM type 2 (diabetes mellitus, type 2) ([facility] 10/29/2013    Eczema     Gastric polyp     GERD (gastroesophageal reflux disease)     H/O bone density study 9.2.11    lumbar spine, lt femoral neck osteopenia, rt femoral neck normal    High blood pressure     HTN (hypertension)     Hypokalemia     Hypovitaminosis D     VANIA (iron deficiency anemia)     MR (mental retardation)     Neurogenic dysfunction of the urinary bladder 2019    Osteopenia     Pressure ulcer of coccygeal region, unstageable ([facility] 2020    Seizure disorder (HCC)     Wound of back        Problem List:    Patient Active Problem List   Diagnosis    Disorder of intellectual development    Diabetes mellitus due to underlying condition with stage 1 chronic kidney disease, without long-term current use of insulin (HCC)    Seizure disorder (HCC)    GERD (gastroesophageal reflux disease)    Chronic UTI    Osteopenia    Dermatitis    Eczema    Hypovitaminosis D    topically as needed.  aspirin 81 MG chewable tablet Take 81 mg by mouth daily      folic acid (FOLVITE) 1 MG tablet Take 1 mg by mouth daily      [facility]. Devices [facility]) MISC Manual wheel chair with foot petals  A50.49 1 each 0    Elastic Bandages & Supports (T.E.D. KNEE LENGTH/M-REGULAR) MISC 1 set of MINERVA stockings. Apply daily while upright in wheelchair as needed for dependent edema control.   Pharmacy/ DME may adjust size as appropriate with corrected measurement 1 each 0    amLODIPine (NORVASC) 5 MG tablet Take 5 mg by mouth daily      acetaminophen (TYLENOL) 325 MG tablet Take 325 mg by mouth daily      atorvastatin (LIPITOR) 10 MG tablet Take 10 mg by mouth daily      busPIRone (BUSPAR) 15 MG tablet Take 15 mg by mouth 3 times daily      cloNIDine (CATAPRES) 0.1 MG tablet Take 0.1 mg by mouth 3 times daily      Cranberry 500 MG CAPS Take 500 mg by mouth 2 times daily      ferrous sulfate 325 (65 Fe) MG tablet Take 325 mg by mouth 2 times daily      levETIRAcetam (KEPPRA) 750 MG tablet Take 750 mg by mouth 2 times daily      lisinopril (PRINIVIL;ZESTRIL) 40 MG tablet Take 40 mg by mouth 2 times daily      metFORMIN (GLUCOPHAGE) 500 MG tablet Take 750 mg by mouth 2 times daily (with meals)      Multiple Vitamin (MULTIVITAMINS PO) Take by mouth daily      calcium carbonate (OSCAL) 500 MG TABS tablet Take 500 mg by mouth 2 times daily      pantoprazole (PROTONIX) 40 MG tablet Take 40 mg by mouth daily      pioglitazone (ACTOS) 30 MG tablet Take 45 mg by mouth daily       Pyridoxine HCl (VITAMIN B-6) 50 MG tablet Take 25 mg by mouth daily      Cholecalciferol (VITAMIN D3) 2000 units CAPS Take by mouth Daily with lunch      ondansetron (ZOFRAN) 4 MG tablet Take 4 mg by mouth every 6 hours as needed for Nausea or Vomiting      promethazine (PHENERGAN) 25 MG suppository Place 25 mg rectally every 6 hours as needed for Nausea       No current facility-administered medications on file prior to encounter. REVIEW OF SYSTEMS    Pertinent items are noted in HPI. Objective:      BP (!) 173/95   Pulse 78   Temp 96.5 °F (35.8 °C) (Temporal)   Resp 16     Wt Readings from Last 3 Encounters:   09/11/20 155 lb (70.3 kg)   09/18/19 155 lb (70.3 kg)   07/29/19 180 lb (81.6 kg)       PHYSICAL EXAM    Constitutional:   Well nourished and well developed. Appears neat and clean. Patient is alert, and in no apparent distress. Makes eye contact with provider when spoken too and makes attempt at verbailization today. Respiratory:  Respiratory effort is easy and symmetric bilaterally. Rate is normal at rest and on room air. Vascular:  Extremities positive for pitting edema, trace to 1+ bilateral lower extremities. Per caregiver at bedside, edema resolves at night and once dependent during the day, returns. Neurological:  Gross and Light touch intact. Dermatological:  Wound description noted in wound assessment. Wound is clean with granulation tissue present throughout wound bed. Small amount of slough at wound margin with scant amount in base of wound bed. Sangeetha wound has some maceration. Wound is moist, no odor. Dried superficial healing area near rectum is healed. May continue to apply calmoseptine for skin barrier benefit. Psychiatric:    No evidence of  anxiety, or agitation. Patient is calm, cooperative, and communicative. Appropriate interactions and affect. Assessment:      Problem List Items Addressed This Visit     Chronic ulcer of sacral region, with unspecified severity ([address]    Ulcer of perianal area, limited to breakdown of skin ([address]           Procedure Note  Indications:  Based on my examination of this patient's wound(s)/ulcer(s) today, debridement is not required to promote healing and evaluate the wound base.     Wound 09/17/20 Coccyx #1 (Active)   Wound Image   11/19/20 1105   Wound Etiology Pressure Stage  3 11/19/20 1105   Wound Cleansed Cleansed with saline 11/19/20 1105   Wound Length (cm) 3.2 cm 11/19/20 1105   Wound Width (cm) 1.5 cm 11/19/20 1105   Wound Depth (cm) 1.7 cm 11/19/20 1105   Wound Surface Area (cm^2) 4.8 cm^2 11/19/20 1105   Change in Wound Size % (l*w) 76 11/19/20 1105   Wound Volume (cm^3) 8.16 cm^3 11/19/20 1105   Wound Healing % -104 11/19/20 1105   Post-Procedure Length (cm) 3.8 cm 10/15/20 1037   Post-Procedure Width (cm) 4 cm 10/15/20 1037   Post-Procedure Depth (cm) 1.3 cm 10/15/20 1037   Post-Procedure Surface Area (cm^2) 15.2 cm^2 10/15/20 1037   Post-Procedure Volume (cm^3) 19.76 cm^3 10/15/20 1037   Distance Tunneling (cm) 1.6 cm 10/15/20 1034   Tunneling Position ___ O'Clock 1 10/15/20 1034   Undermining Starts ___ O'Clock 1 11/19/20 1105   Undermining Ends___ O'Clock 5 11/19/20 1105   Undermining Maxium Distance (cm) 2.3 11/19/20 1105   Wound Assessment Granulation tissue;Slough 11/19/20 1105   Drainage Amount Moderate 11/19/20 1105   Drainage Description Serosanguinous 11/19/20 1105   Odor None 11/19/20 1105   Amilcar-wound Assessment Fragile; Maceration 11/19/20 1105   Margins Defined edges 11/19/20 1105   Wound Thickness Description not for Pressure Injury Full thickness 10/29/20 1121   Number of days: 63                  Plan:     Continue with plan of care as below. Recheck in 3 weeks. Discussed with caregiver, if wound deteriorates prior to that time, or maceration at wound edge worsens, call for earlier appointment. Make sure protective liquid skin barrier being applied to amilcar wound to prevent maceration prior to applying moist 0.9 NS gauze over santyl. Treatment Note please see attached Discharge Instructions    Written patient dismissal instructions given to patient and signed by patient or POA.          Discharge Instructions         Discharge [address] and [facility]   Physician Orders and Discharge Instructions  [address] [facility] [address] [facility]  Telephone: [phone]      [phone]        NAME: [place]  DATE OF BIRTH: Darrel Briggs RECORD NUMBER:  [id number]     Home Care/Facility:  [facility]     Wound Location:  Coccyx     Dressing orders: 1. Cleanse wound(s) with normal saline. Skin prep to periwound  2. Apply a nickel thickness layer of SANTYL OINTMENT to the wound bed for         enzymatic debridement purposes. 3. Apply a moistened saline 4x4 (gauze pad) over the Santyl Ointment.  Pack gauze into deep areas. 4. Cover with additional dry 4x4's or ABD  5. Change Every Day.                    Compression:      Offloading Device: Keep heels elevated off surfaces.  Gel cushion in wheelchair. Use  Low air-loss mattress for bed. . turn side to side in bed    Other Instructions: Calmoseptine to small area distal to wound. Apply daily     Keep all dressings clean, dry and intact.  Keep pressure off the wound(s) at all times.      Follow up visit  3  Weeks  December 10  , 2020 @       Please give 24 hour notice if unable to keep appointment. [phone]     If you experience any of the following, please call the Wound Care Service at  [phone] or go to the nearest emergency room.        *Increase in pain         *Temperature over 101           *Increase in drainage from your wound or a foul odor  *Uncontrolled swelling            *Need for compression bandage changes due to slippage, breakthrough drainage       PLEASE NOTE: IF YOU ARE UNABLE TO OBTAIN WOUND SUPPLIES, CONTINUE TO USE THE SUPPLIES YOU HAVE AVAILABLE UNTIL YOU ARE ABLE TO [facility].  IT IS MOST IMPORTANT TO KEEP THE WOUND COVERED AT ALL TIMES  Electronically signed by WILI Olsen NP on 11/19/2020 at 11:40 AM                   Electronically signed by WILI Olsen NP on 11/19/2020 at 11:41 AM

## 2020-12-28 ENCOUNTER — HOSPITAL ENCOUNTER (OUTPATIENT)
Dept: WOUND CARE | Age: 70
Discharge: HOME | End: 2020-12-28
Payer: MEDICAID

## 2020-12-28 VITALS
RESPIRATION RATE: 16 BRPM | SYSTOLIC BLOOD PRESSURE: 144 MMHG | DIASTOLIC BLOOD PRESSURE: 88 MMHG | TEMPERATURE: 96.5 F | HEART RATE: 80 BPM

## 2020-12-28 PROBLEM — S31.000D SACRAL WOUND, SUBSEQUENT ENCOUNTER: Status: ACTIVE | Noted: 2020-12-28

## 2020-12-28 PROCEDURE — 99212 OFFICE O/P EST SF 10 MIN: CPT | Performed by: NURSE PRACTITIONER

## 2020-12-28 PROCEDURE — 99213 OFFICE O/P EST LOW 20 MIN: CPT

## 2020-12-28 NOTE — PROGRESS NOTES
Zaina Dickerson 37   Progress Note and Procedure Note      Carmencita Lorenzo  MEDICAL RECORD NUMBER:  [id number]  AGE: 78 y.o. GENDER: male  : 1950  EPISODE DATE:  2020    Subjective:     Chief Complaint   Patient presents with    Wound Check     sacrum         HISTORY of PRESENT ILLNESS HPI     Carmencita Lorenzo is a 78 y.o. male who presents today for wound/ulcer evaluation. History of Wound Context: recheck on non-healing pressure ulcer of sacrum. Missed last appointment, today wound bed clean, with some hyperkeratotic tissue noted. Sangeetha rectal wound previously present, nearly healed, covered with scabbed/dried covering.    Wound/Ulcer Pain Timing/Severity: intermittent   Quality of pain: tender  Severity:  0 / 10   Modifying Factors: Pain worsens with worsens with care and subsides following completion of   Associated Signs/Symptoms: drainage    Ulcer Identification:  Ulcer Type: pressure  Contributing Factors: chronic pressure, decreased mobility, shear force, incontinence of stool and incontinence of urine    Wound: N/A        PAST MEDICAL HISTORY        Diagnosis Date    Anemia     Chronic indwelling Ha catheter     Chronic UTI     Chronic UTI     Dermatitis     [facility]     DM type 2 (diabetes mellitus, type 2) ([facility] 10/29/2013    Eczema     Gastric polyp     GERD (gastroesophageal reflux disease)     H/O bone density study 9.2.11    lumbar spine, lt femoral neck osteopenia, rt femoral neck normal    High blood pressure     HTN (hypertension)     Hypokalemia     Hypovitaminosis D     VANIA (iron deficiency anemia)     MR (mental retardation)     Neurogenic dysfunction of the urinary bladder 2019    Osteopenia     Pressure ulcer of coccygeal region, unstageable ([facility] 2020    Seizure disorder ([facility]     Wound of back        Problem List:    Patient Active Problem List   Diagnosis    Disorder of intellectual development    Diabetes mellitus due to underlying condition with stage 1 chronic kidney disease, without long-term current use of insulin (HCC)    Seizure disorder (HCC)    GERD (gastroesophageal reflux disease)    Chronic UTI    Osteopenia    Dermatitis    Eczema    Hypovitaminosis D    VANIA (iron deficiency anemia)    Chronic indwelling Ha catheter    Essential hypertension    Full code status    Dietary restriction    Degenerative arthritis of hip    Neurogenic dysfunction of the urinary bladder    Foot deformity    Chronic ulcer of sacral region, with unspecified severity (HCC)    Ulcer of perianal area, limited to breakdown of skin ([address]    Pressure injury of heel, stage 1        PAST SURGICAL HISTORY    Past Surgical History:   Procedure Laterality Date    COLONOSCOPY  8.19.11    negative    UPPER GASTROINTESTINAL ENDOSCOPY  8.19.11    by  [facility] N/A 11/7/2019    EGD performed by Doug Marshall MD at [address] [place] [address]    History reviewed. No pertinent family history.     SOCIAL HISTORY    Social History     Tobacco Use    Smoking status: Never Smoker    Smokeless tobacco: Never Used   Substance Use Topics    Alcohol use: No    Drug use: No       ALLERGIES    No Known Allergies    MEDICATIONS    Current Outpatient Medications on File Prior to Encounter   Medication Sig Dispense Refill    sertraline (ZOLOFT) 100 MG tablet TAKE ONE (1) TABLET BY MOUTH ONCE (1) DAILY IN THE MORNING (ANTI-DEPRESSANT) 90 tablet 0    loratadine (CLARITIN) 10 MG tablet TAKE ONE (1) TABLET BY MOUTH ONCE (1) DAILY (PRURITIC DERMATITUS) 90 tablet 0    vitamin B-12 (CYANOCOBALAMIN) 1000 MCG tablet Take 1,000 mcg by mouth daily      JANUVIA 100 MG tablet TAKE ONE (1) TABLET BY MOUTH ONCE (1) DAILY 30 tablet 0    SITagliptin (JANUVIA) 100 MG tablet TAKE ONE (1) TABLET BY MOUTH ONCE (1) DAILY 31 tablet 0    loperamide (IMODIUM) 2 MG capsule Take 2 mg by mouth 4 times daily as needed for Diarrhea      Na Sulfate-K Sulfate-Mg Sulf 17.5-3.13-1.6 GM/177ML SOLN As directed 1 Bottle 0    ammonium lactate (LAC-HYDRIN) 12 % lotion Apply topically as needed for Dry Skin Apply topically as needed.  aspirin 81 MG chewable tablet Take 81 mg by mouth daily      folic acid (FOLVITE) 1 MG tablet Take 1 mg by mouth daily      [facility]. Devices [facility]) MISC Manual wheel chair with foot petals  A50.49 1 each 0    Elastic Bandages & Supports (T.E.D. KNEE LENGTH/M-REGULAR) MISC 1 set of MINERVA stockings. Apply daily while upright in wheelchair as needed for dependent edema control.   Pharmacy/ DME may adjust size as appropriate with corrected measurement 1 each 0    amLODIPine (NORVASC) 5 MG tablet Take 5 mg by mouth daily      acetaminophen (TYLENOL) 325 MG tablet Take 325 mg by mouth daily      atorvastatin (LIPITOR) 10 MG tablet Take 10 mg by mouth daily      busPIRone (BUSPAR) 15 MG tablet Take 15 mg by mouth 3 times daily      cloNIDine (CATAPRES) 0.1 MG tablet Take 0.1 mg by mouth 3 times daily      Cranberry 500 MG CAPS Take 500 mg by mouth 2 times daily      ferrous sulfate 325 (65 Fe) MG tablet Take 325 mg by mouth 2 times daily      levETIRAcetam (KEPPRA) 750 MG tablet Take 750 mg by mouth 2 times daily      lisinopril (PRINIVIL;ZESTRIL) 40 MG tablet Take 40 mg by mouth 2 times daily      metFORMIN (GLUCOPHAGE) 500 MG tablet Take 750 mg by mouth 2 times daily (with meals)      Multiple Vitamin (MULTIVITAMINS PO) Take by mouth daily      calcium carbonate (OSCAL) 500 MG TABS tablet Take 500 mg by mouth 2 times daily      pantoprazole (PROTONIX) 40 MG tablet Take 40 mg by mouth daily      pioglitazone (ACTOS) 30 MG tablet Take 45 mg by mouth daily       Pyridoxine HCl (VITAMIN B-6) 50 MG tablet Take 25 mg by mouth daily      Cholecalciferol (VITAMIN D3) 2000 units CAPS Take by mouth Daily with lunch      ondansetron (ZOFRAN) 4 MG tablet Take 4 mg by mouth every 6 hours as needed for [address]  [facility]  Telephone: [phone]      [phone]        NAME: Shireen German  DATE OF BIRTH: Darrel Briggs RECORD NUMBER:  [id number]     Home Care/Facility: [facility]; lindsey sent home with patient     Wound Location:  Coccyx     Dressing orders: 1. Cleanse wound(s) with normal saline. 2. Apply dry LINDSEY  Or equivalent to wound bed. 3. Moisten LINDSEY with a few drops of normal saline. 4. Cover with 4x4's  And paper tape  5. Change  Every other day or Monday, Wednesday, and Friday    If slough appears again restart santyl         1. Cleanse wound(s) with normal saline. 2. Apply a nickel thickness layer of SANTYL OINTMENT or PLUROGEL to the wound bed for   enzymatic debridement purposes. 3. Apply a moistened saline 4x4 (gauze pad) over the Santyl Ointment only or DSD over Plurogel. 4. Cover with additional dry 4x4's and wrap with gauze (antonietta or kerlix). 5. Change Every Day.            Compression:      Offloading Device: Keep heels elevated off surfaces.  Gel cushion in wheelchair.  Use  Low air-loss mattress for bed. . turn side to side in bed     Other Instructions: calmoseptine distal to wound daily     Keep all dressings clean, dry and intact.  Keep pressure off the wound(s) at all times.      Follow up visit Sofy Alvarez  18 , 2021 @  10:45     Please give 24 hour notice if unable to keep appointment. [phone]     If you experience any of the following, please call the Wound Care Service at  [phone] or go to the nearest emergency room.        *Increase in pain         *Temperature over 101           *Increase in drainage from your wound or a foul odor  *Uncontrolled swelling            *Need for compression bandage changes due to slippage, breakthrough drainage       PLEASE NOTE: IF YOU ARE UNABLE TO OBTAIN WOUND SUPPLIES, CONTINUE TO USE THE SUPPLIES YOU HAVE AVAILABLE UNTIL YOU ARE ABLE TO [address].  IT IS MOST IMPORTANT TO KEEP THE WOUND COVERED AT ALL TIMES  Due to the COVID-19 surge and the mandate to reduce the Wound Center hours to only 2 days per week until further notice, the patient was told that if there is any worsening of the wound(s), he/she should call us immediately to prevent having to go to the Emergency Room.    Electronically signed by WILI Isbell NP on 12/28/2020 at 11:43 AM        Electronically signed by WILI Isbell NP on 12/28/2020 at 11:44 AM

## 2020-12-28 NOTE — DISCHARGE INSTR - COC
Continuity of Care Form    Patient Name: Mitzi Holman   :  1950  MRN:  [id number]    Admit date:  2020  Discharge date:  ***    Code Status Order: No Order   Advance Directives:   [facility] Documentation     Date/Time Healthcare Directive Type of Healthcare Directive Copy in [facility] [address] Agent's Name Healthcare Agent's Phone Number    20 [phone]  Unknown, patient unable to respond due to medical condition -- -- -- -- --          Admitting Physician:  No admitting provider for patient encounter. PCP: Macy Mcburney, MD    Discharging Nurse: [facility] Unit/Room#: No information available for this encounter.   Discharging Unit Phone Number: ***    Emergency Contact:   Extended Emergency Contact Information  Primary Emergency Contact: Sumaya Lo (nurse)  Home Phone: [phone]  Relation: Other    Past Surgical History:  Past Surgical History:   Procedure Laterality Date    COLONOSCOPY  11    negative    UPPER GASTROINTESTINAL ENDOSCOPY  11    by Dr. Jaydon Orta N/A 2019    EGD performed by Maria M Saucedo MD at [facility]       Immunization History:   Immunization History   Administered Date(s) Administered    Hepatitis B Adult (Engerix-B) 2014    Influenza Virus Vaccine 10/20/2010, 10/01/2011, 10/01/2012, 10/18/2013, 10/26/2014, 10/23/2015, 2016    PPD Test 2008, 2009, 2010, 2011, 2012, 2013, 2014, 2015, 2016, 2017    Pneumococcal Conjugate 13-valent (Prxtyuz43) 2016    Pneumococcal Conjugate 7-valent (Prevnar7) 2006    Td, unspecified formulation 2006, 2017       Active Problems:  Patient Active Problem List   Diagnosis Code    Disorder of intellectual development F79    Diabetes mellitus due to underlying condition with stage 1 chronic kidney disease, without long-term current use of insulin ([facility]) E08.22, N18.1    Seizure disorder ([facility] [address] G40.909    GERD (gastroesophageal reflux disease) K21.9    Chronic UTI N39.0    Osteopenia M85.80    Dermatitis L30.9    Eczema L30.9    Hypovitaminosis D E55.9    VANIA (iron deficiency anemia) D50.9    Chronic indwelling Ha catheter Z97.8    Essential hypertension I10    Full code status Z78.9    Dietary restriction Z71.3    Degenerative arthritis of hip M16.9    Neurogenic dysfunction of the urinary bladder N31.9    Foot deformity M21.969    Chronic ulcer of sacral region, with unspecified severity ([facility]) L98.429    Ulcer of perianal area, limited to breakdown of skin ([facility]) L98.491    Pressure injury of heel, stage 1 L89.601       Isolation/Infection:   Isolation          No Isolation        Patient Infection Status     None to display          Nurse Assessment:  Last Vital Signs: There were no vitals taken for this visit.     Last documented pain score (0-10 scale):    Last Weight:   Wt Readings from Last 1 Encounters:   09/11/20 155 lb (70.3 kg)     Mental Status:  {IP PT MENTAL STATUS:03990}    IV Access:  [facility] NADIA IV ACCESS:743564282}    Nursing Mobility/ADLs:  Walking   {P DME JXMK:688747920}  Transfer  {[facility] DME PUUN:131376964}  Bathing  {P DME TKAU:360645450}  Dressing  {P DME GEYE:504176451}  Toileting  {P DME IPEL:470528330}  Feeding  {[facility] DME SPBL:452942253}  Med Admin  {[facility] DME BCES:822598900}  Med Delivery   { NADIA MED Delivery:211908766}    Wound Care Documentation and Therapy:  Wound 09/17/20 Coccyx #1 (Active)   Wound Image   12/28/20 1130   Wound Etiology Pressure Stage  3 12/28/20 1130   Wound Cleansed Cleansed with saline 12/28/20 1130   Wound Length (cm) 2.1 cm 12/28/20 1130   Wound Width (cm) 0.5 cm 12/28/20 1130   Wound Depth (cm) 0.5 cm 12/28/20 1130   Wound Surface Area (cm^2) 1.05 cm^2 12/28/20 1130   Change in Wound Size % (l*w) 94.75 12/28/20 1130   Wound Volume (cm^3) 0.52 cm^3 12/28/20 1130   Wound Healing % 87 12/28/20 1130   Post-Procedure Length (cm) 3.8 cm 10/15/20 1037   Post-Procedure Width (cm) 4 cm 10/15/20 1037   Post-Procedure Depth (cm) 1.3 cm 10/15/20 1037   Post-Procedure Surface Area (cm^2) 15.2 cm^2 10/15/20 1037   Post-Procedure Volume (cm^3) 19.76 cm^3 10/15/20 1037   Distance Tunneling (cm) 1.6 cm 10/15/20 1034   Tunneling Position ___ O'Clock 1 10/15/20 1034   Undermining Starts ___ O'Clock 1 11/19/20 1105   Undermining Ends___ O'Clock 5 11/19/20 1105   Undermining Maxium Distance (cm) 2.3 11/19/20 1105   Wound Assessment Granulation tissue; Hyper granulation tissue 12/28/20 1130   Drainage Amount Small 12/28/20 1130   Drainage Description Serosanguinous 12/28/20 1130   Odor None 12/28/20 1130   Sangeetha-wound Assessment Intact 12/28/20 1130   Margins Defined edges 12/28/20 1130   Wound Thickness Description not for Pressure Injury Full thickness 12/28/20 1130   Number of days: 102        Elimination:  Continence:   · Bowel: {YES / QV:20916}  · Bladder: {YES / QX:69869}  Urinary Catheter: {Urinary Catheter:286027069}   Colostomy/Ileostomy/Ileal Conduit: {YES / BD:11145}       Date of Last BM: ***  No intake or output data in the 24 hours ending 12/28/20 1132  No intake/output data recorded.     Safety Concerns:     [address] [employer] Safety Concerns:206033625}    Impairments/Disabilities:      [employer] Impairments/Disabilities:332835536}    Nutrition Therapy:  Current Nutrition Therapy:   [employer] Diet List:215288758}    Routes of Feeding: {CHP DME Other Feedings:165202262}  Liquids: {Slp liquid thickness:01738}  Daily Fluid Restriction: {CHP DME Yes amt example:260373280}  Last Modified Barium Swallow with Video (Video Swallowing Test): {Done Not Done TBBW:529300457}    Treatments at the Time of Hospital Discharge:   Respiratory Treatments: ***  Oxygen Therapy:  {Therapy; copd oxygen:63979}  Ventilator:    { CC Vent WDQJ:552142223}    Rehab Therapies: {THERAPEUTIC INTERVENTION:8269234332}  Weight Bearing Status/Restrictions: [address] [employer] CC Weight Bearin}  Other Medical Equipment (for information only, NOT a DME order):  {EQUIPMENT:024254060}  Other Treatments: ***    Patient's personal belongings (please select all that are sent with patient):  {CHP DME Belongings:086449910}    RN SIGNATURE:  {Esignature:091859422}    CASE MANAGEMENT/SOCIAL WORK SECTION    Inpatient Status Date: ***    Readmission Risk Assessment Score:  Readmission Risk              Risk of Unplanned Readmission:        0           Discharging to Facility/ Agency   · Name:   · Address:  · Phone:  · Fax:    Dialysis Facility (if applicable)   · Name:  · Address:  · Dialysis Schedule:  · Phone:  · Fax:    / signature: {Esignature:062128930}    PHYSICIAN SECTION    Prognosis: {Prognosis:9208103772}    Condition at Discharge: [address] Patient Condition:580992594}    Rehab Potential (if transferring to Rehab): {Prognosis:0407656916}    Recommended Labs or Other Treatments After Discharge: ***    Physician Certification: I certify the above information and transfer of Rene Curling  is necessary for the continuing treatment of the diagnosis listed and that he requires {Admit to Appropriate Level of Care:51863} for {GREATER/LESS:482916539} 30 days.      Update Admission H&P: {CHP DME Changes in QGPRM:072515509}    PHYSICIAN SIGNATURE:  {Esignature:294714908}

## 2021-01-18 ENCOUNTER — HOSPITAL ENCOUNTER (OUTPATIENT)
Dept: WOUND CARE | Age: 71
Discharge: HOME | End: 2021-01-18
Payer: MEDICAID

## 2021-01-18 VITALS
HEART RATE: 83 BPM | DIASTOLIC BLOOD PRESSURE: 90 MMHG | RESPIRATION RATE: 16 BRPM | SYSTOLIC BLOOD PRESSURE: 137 MMHG | TEMPERATURE: 98.2 F

## 2021-01-18 PROCEDURE — 99213 OFFICE O/P EST LOW 20 MIN: CPT

## 2021-02-03 RX ORDER — LORATADINE 10 MG/1
TABLET ORAL
Qty: 90 TABLET | Refills: 0 | OUTPATIENT
Start: 2021-02-03

## 2021-02-09 RX ORDER — LORATADINE 10 MG/1
TABLET ORAL
Qty: 30 TABLET | Refills: 10 | OUTPATIENT
Start: 2021-02-09

## 2021-02-15 RX ORDER — LORATADINE 10 MG/1
TABLET ORAL
Qty: 31 TABLET | Refills: 10 | OUTPATIENT
Start: 2021-02-15

## 2021-02-15 RX ORDER — SERTRALINE HYDROCHLORIDE 100 MG/1
TABLET, FILM COATED ORAL
Qty: 31 TABLET | Refills: 10 | OUTPATIENT
Start: 2021-02-15

## 2021-02-25 ENCOUNTER — HOSPITAL ENCOUNTER (OUTPATIENT)
Dept: WOUND CARE | Age: 71
Discharge: HOME | End: 2021-02-25
Payer: MEDICAID

## 2021-02-25 VITALS
DIASTOLIC BLOOD PRESSURE: 93 MMHG | HEART RATE: 83 BPM | TEMPERATURE: 98.3 F | RESPIRATION RATE: 16 BRPM | SYSTOLIC BLOOD PRESSURE: 165 MMHG

## 2021-02-25 DIAGNOSIS — S71.101A OPEN WOUND OF RIGHT HIP AND THIGH WITH COMPLICATION, INITIAL ENCOUNTER: ICD-10-CM

## 2021-02-25 DIAGNOSIS — S71.001A OPEN WOUND OF RIGHT HIP AND THIGH WITH COMPLICATION, INITIAL ENCOUNTER: ICD-10-CM

## 2021-02-25 PROBLEM — S71.009A WOUND, OPEN, HIP OR THIGH WITH COMPLICATION: Status: ACTIVE | Noted: 2021-02-25

## 2021-02-25 PROBLEM — S71.109A WOUND, OPEN, HIP OR THIGH WITH COMPLICATION: Status: ACTIVE | Noted: 2021-02-25

## 2021-02-25 PROCEDURE — 99213 OFFICE O/P EST LOW 20 MIN: CPT

## 2021-02-25 PROCEDURE — 99212 OFFICE O/P EST SF 10 MIN: CPT | Performed by: NURSE PRACTITIONER

## 2021-02-25 NOTE — PROGRESS NOTES
[facility]   Progress Note and Procedure Note      Katherine Ha  MEDICAL RECORD NUMBER:  [id number]  AGE: 78 y.o. GENDER: male  : 1950  EPISODE DATE:  2021    Subjective:     Chief Complaint   Patient presents with    Wound Check     coccyx wound recheck         HISTORY of PRESENT ILLNESS HPI     Katherine Ha is a 78 y.o. male who presents today for wound/ulcer evaluation. History of Wound Context: recheck on non-healing pressure ulcer of sacrum. Missed last appointment, today sacral wound bed clean, with some hyperkeratotic tissue noted, measurements improved. New wound present on left posterior upper thigh, with some fibrotic slough covering wound, making wound unstageable. Wound/Ulcer Pain Timing/Severity: intermittent  Quality of pain: tender  Severity:  0 / 10   Modifying Factors: Pain worsens with care, and subsides following completion of.    Associated Signs/Symptoms: drainage    Ulcer Identification:  Ulcer Type: pressure  Contributing Factors: chronic pressure, decreased mobility, shear force, incontinence of stool and incontinence of urine    Wound: N/A        PAST MEDICAL HISTORY        Diagnosis Date    Anemia     Chronic indwelling Ha catheter     Chronic UTI     Chronic UTI     Dermatitis     DM (diabetes mellitus) ([facility] [address]     DM type 2 (diabetes mellitus, type 2) ([facility] [address] 10/29/2013    Eczema     Gastric polyp     GERD (gastroesophageal reflux disease)     H/O bone density study 9.2.11    lumbar spine, lt femoral neck osteopenia, rt femoral neck normal    High blood pressure     HTN (hypertension)     Hypokalemia     Hypovitaminosis D     VANIA (iron deficiency anemia)     MR (mental retardation)     Neurogenic dysfunction of the urinary bladder 2019    Osteopenia     Pressure ulcer of coccygeal region, unstageable ([facility] [address] 2020    Seizure disorder ([facility] [address]     Wound of back        Problem List:    Patient Active Problem List   Diagnosis    Disorder of intellectual development    Diabetes mellitus due to underlying condition with stage 1 chronic kidney disease, without long-term current use of insulin (HCC)    Seizure disorder (HCC)    GERD (gastroesophageal reflux disease)    Chronic UTI    Osteopenia    Dermatitis    Eczema    Hypovitaminosis D    VANIA (iron deficiency anemia)    Chronic indwelling Ha catheter    Essential hypertension    Full code status    Dietary restriction    Degenerative arthritis of hip    Neurogenic dysfunction of the urinary bladder    Foot deformity    Chronic ulcer of sacral region, with unspecified severity (HCC)    Ulcer of perianal area, limited to breakdown of skin (HCC)    Pressure injury of heel, stage 1    Sacral wound, subsequent encounter        PAST SURGICAL HISTORY    Past Surgical History:   Procedure Laterality Date    COLONOSCOPY  8.19.11    negative    UPPER GASTROINTESTINAL ENDOSCOPY  8.19.11    by Dr. Charisma Greene N/A 11/7/2019    EGD performed by Marilu Polo MD at [address] [place] [address]    History reviewed. No pertinent family history.     SOCIAL HISTORY    Social History     Tobacco Use    Smoking status: Never Smoker    Smokeless tobacco: Never Used   Substance Use Topics    Alcohol use: No    Drug use: No       ALLERGIES    No Known Allergies    MEDICATIONS    Current Outpatient Medications on File Prior to Encounter   Medication Sig Dispense Refill    sertraline (ZOLOFT) 100 MG tablet TAKE ONE (1) TABLET BY MOUTH ONCE (1) DAILY IN THE MORNING (ANTI-DEPRESSANT) 90 tablet 0    loratadine (CLARITIN) 10 MG tablet TAKE ONE (1) TABLET BY MOUTH ONCE (1) DAILY (PRURITIC DERMATITUS) 90 tablet 0    vitamin B-12 (CYANOCOBALAMIN) 1000 MCG tablet Take 1,000 mcg by mouth daily      JANUVIA 100 MG tablet TAKE ONE (1) TABLET BY MOUTH ONCE (1) DAILY 30 tablet 0    SITagliptin (JANUVIA) 100 MG tablet TAKE ONE (1) TABLET BY MOUTH ONCE (1) DAILY 31 tablet 0    loperamide (IMODIUM) 2 MG capsule Take 2 mg by mouth 4 times daily as needed for Diarrhea      Na Sulfate-K Sulfate-Mg Sulf 17.5-3.13-1.6 GM/177ML SOLN As directed 1 Bottle 0    Lancet Device MISC 1 Device by Does not apply route once for 1 dose 1 Device 0    ammonium lactate (LAC-HYDRIN) 12 % lotion Apply topically as needed for Dry Skin Apply topically as needed.  aspirin 81 MG chewable tablet Take 81 mg by mouth daily      folic acid (FOLVITE) 1 MG tablet Take 1 mg by mouth daily      [facility]. Devices [facility]) MISC Manual wheel chair with foot petals  A50.49 1 each 0    Elastic Bandages & Supports (T.E.D. KNEE LENGTH/M-REGULAR) MISC 1 set of MINERVA stockings. Apply daily while upright in wheelchair as needed for dependent edema control.   Pharmacy/ DME may adjust size as appropriate with corrected measurement 1 each 0    amLODIPine (NORVASC) 5 MG tablet Take 5 mg by mouth daily      acetaminophen (TYLENOL) 325 MG tablet Take 325 mg by mouth daily      atorvastatin (LIPITOR) 10 MG tablet Take 10 mg by mouth daily      busPIRone (BUSPAR) 15 MG tablet Take 15 mg by mouth 3 times daily      cloNIDine (CATAPRES) 0.1 MG tablet Take 0.1 mg by mouth 3 times daily      Cranberry 500 MG CAPS Take 500 mg by mouth 2 times daily      ferrous sulfate 325 (65 Fe) MG tablet Take 325 mg by mouth 2 times daily      levETIRAcetam (KEPPRA) 750 MG tablet Take 750 mg by mouth 2 times daily      lisinopril (PRINIVIL;ZESTRIL) 40 MG tablet Take 40 mg by mouth 2 times daily      metFORMIN (GLUCOPHAGE) 500 MG tablet Take 750 mg by mouth 2 times daily (with meals)      Multiple Vitamin (MULTIVITAMINS PO) Take by mouth daily      calcium carbonate (OSCAL) 500 MG TABS tablet Take 500 mg by mouth 2 times daily      pantoprazole (PROTONIX) 40 MG tablet Take 40 mg by mouth daily      pioglitazone (ACTOS) 30 MG tablet Take 45 mg by mouth daily       Pyridoxine HCl (VITAMIN B-6) 50 MG tablet Take 25 mg by mouth daily      Cholecalciferol (VITAMIN D3) 2000 units CAPS Take by mouth Daily with lunch      ondansetron (ZOFRAN) 4 MG tablet Take 4 mg by mouth every 6 hours as needed for Nausea or Vomiting      promethazine (PHENERGAN) 25 MG suppository Place 25 mg rectally every 6 hours as needed for Nausea       No current facility-administered medications on file prior to encounter. REVIEW OF SYSTEMS    Pertinent items are noted in HPI. Objective:      BP (!) 165/93   Pulse 83   Temp 98.3 °F (36.8 °C) (Temporal)   Resp 16     Wt Readings from Last 3 Encounters:   09/11/20 155 lb (70.3 kg)   09/18/19 155 lb (70.3 kg)   07/29/19 180 lb (81.6 kg)       PHYSICAL EXAM    Constitutional:   Well nourished and well developed. Appears neat and clean. Patient is alert, in no apparent distress. Non-verbal secondary to developmental disorder, does acknowledge provider with hand motion. Respiratory:  Respiratory effort is easy and symmetric bilaterally. Rate is normal at rest and on room air. Vascular:  Extremities negative for pitting edema. Neurological:  Gross and Light touch intact as noted by vocalizations throughout exam/care. Dermatological:  Wound description noted in wound assessment. Sacral wound remains clean, some hyperkeratotic tissue at wound edge, dimensions improved. New wound present on posterior upper thigh, is currently unstageable, covered with fibrotic yellow tissue and eschar. Psychiatric: . No agitation. Patient is calm, cooperative, and appears to be at normal baseline. Assessment:      Problem List Items Addressed This Visit     None           Procedure Note  Indications:  Based on my examination of this patient's wound(s)/ulcer(s) today, debridement is not required to promote healing and evaluate the wound base.     Wound 09/17/20 Coccyx #1 (Active)   Wound Image   02/25/21 1021   Wound Etiology Pressure Stage  3 02/25/21 1012   Wound Cleansed Cleansed with saline 02/25/21 1021   Dressing/Treatment Collagen with Ag;Dry dressing 01/18/21 1542   Wound Length (cm) 1.4 cm 02/25/21 1021   Wound Width (cm) 0.6 cm 02/25/21 1021   Wound Depth (cm) 0.7 cm 02/25/21 1021   Wound Surface Area (cm^2) 0.84 cm^2 02/25/21 1021   Change in Wound Size % (l*w) 95.8 02/25/21 1021   Wound Volume (cm^3) 0.59 cm^3 02/25/21 1021   Wound Healing % 85 02/25/21 1021   Post-Procedure Length (cm) 3.8 cm 10/15/20 1037   Post-Procedure Width (cm) 4 cm 10/15/20 1037   Post-Procedure Depth (cm) 1.3 cm 10/15/20 1037   Post-Procedure Surface Area (cm^2) 15.2 cm^2 10/15/20 1037   Post-Procedure Volume (cm^3) 19.76 cm^3 10/15/20 1037   Distance Tunneling (cm) 1.6 cm 10/15/20 1034   Tunneling Position ___ O'Clock 1 10/15/20 1034   Undermining Starts ___ O'Clock 12 02/25/21 1021   Undermining Ends___ O'Clock 6 02/25/21 1021   Undermining Maxium Distance (cm) 0.4 02/25/21 1021   Wound Assessment Granulation tissue 02/25/21 1021   Drainage Amount Moderate 02/25/21 1021   Drainage Description Serosanguinous 02/25/21 1021   Odor None 02/25/21 1021   Sangeetha-wound Assessment Intact 02/25/21 1021   Margins Defined edges 02/25/21 1021   Wound Thickness Description not for Pressure Injury Full thickness 02/25/21 1012   Number of days: 161       Wound 02/25/21 #2 left posterior upper thigh (Active)   Wound Image   02/25/21 1021   Wound Etiology Pressure Unstageable 02/25/21 1021   Wound Cleansed Cleansed with saline 02/25/21 1021   Wound Length (cm) 2.5 cm 02/25/21 1021   Wound Width (cm) 2.1 cm 02/25/21 1021   Wound Depth (cm) 0.1 cm 02/25/21 1021   Wound Surface Area (cm^2) 5.25 cm^2 02/25/21 1021   Wound Volume (cm^3) 0.52 cm^3 02/25/21 1021   Wound Assessment Eschar dry;Granulation tissue 02/25/21 1021   Drainage Amount None 02/25/21 1021   Odor None 02/25/21 1021   Sangeetha-wound Assessment Fragile; Intact 02/25/21 1021   Margins Defined edges 02/25/21 1021   Number of days: 0          Plan:     Dressing changes as below. Recheck in 3 weeks. Continue to use pressure redistributing surfaces in bed and wheelchair. Treatment Note please see attached Discharge Instructions    Written patient dismissal instructions given to patient and signed by patient or POA. Discharge [address] and [facility]   Physician Orders and Discharge [address], [address] [place] [address]  Telephone: [phone]      [phone]        NAME: Leslie Tyler  DATE OF BIRTH: Darrel Abdullahi [facility] RECORD NUMBER:  [id number]     Home Care/Facility: [facility]; lindsey sent home with patient     Wound Location:  Coccyx     Dressing orders: 1. Cleanse wound(s) with normal saline. 2. Apply dry LINDSEY  Or equivalent to wound bed. 3. Moisten LINDSEY with a few drops of normal saline. 4. Cover with 4x4's  And paper tape  5. Change  Every other day or Monday, Wednesday, and Friday     Wound Location: Left Posterior upper thigh    Dressing order: 1. Cleanse wound(s) with normal saline. 2. Apply a nickel thickness layer of SANTYL OINTMENT or PLUROGEL to the wound bed for   enzymatic debridement purposes. 3. Apply a moistened saline 4x4 (gauze pad) over the Santyl Ointment only or DSD over Plurogel. 4. Cover with additional dry 4x4's and paper tape. 5. Change Every Day.         Compression:      Offloading Device: Keep heels elevated off surfaces.  Gel cushion in wheelchair.  Use  Low air-loss mattress for bed. . turn side to side in bed     Other Instructions: calmoseptine distal to wound daily     Keep all dressings clean, dry and intact.  Keep pressure off the wound(s) at all times.      Follow up visit  3  Weeks  March 18 , 2021 @  10:00     Please give 24 hour notice if unable to keep appointment.  [phone]     If you experience any of the following, please call the Wound Care Service at  [phone] or go to the nearest emergency room.        *Increase in pain         *Temperature over 101           *Increase in drainage from your wound or a foul odor  *Uncontrolled swelling            *Need for compression bandage changes due to slippage, breakthrough drainage       PLEASE NOTE: IF YOU ARE UNABLE TO OBTAIN WOUND SUPPLIES, CONTINUE TO USE THE SUPPLIES YOU HAVE AVAILABLE UNTIL YOU ARE ABLE TO REACH US. IT IS MOST IMPORTANT TO KEEP THE WOUND COVERED AT ALL TIMES  Due to the COVID-19 surge and the mandate to reduce the Wound Center hours to only 2 days per week until further notice, the patient was told that if there is any worsening of the wound(s), he/she should call us immediately to prevent having to go to the Emergency Room.      Electronically signed by WILI Pringle NP on 2/25/2021 at 10:42 AM           Electronically signed by WILI Pringle NP on 2/25/2021 at 10:44 AM

## 2021-02-25 NOTE — DISCHARGE INSTR - COC
Continuity of Care Form    Patient Name: Keesha Snow   :  1950  MRN:  [id number]    Admit date:  2021  Discharge date:  ***    Code Status Order: No Order   Advance Directives:   Advance Care Flowsheet Documentation     Date/Time Healthcare Directive Type of Healthcare Directive Copy in [address] Agent's Name Healthcare Agent's Phone Number    21 1011  Unknown, patient unable to respond due to medical condition -- -- -- -- --          Admitting Physician:  No admitting provider for patient encounter. PCP: Susie Recinos MD    Discharging Nurse: [facility] Unit/Room#: No information available for this encounter.   Discharging Unit Phone Number: ***    Emergency Contact:   Extended Emergency Contact Information  Primary Emergency Contact: Jeffery Jett (nurse)  Home Phone: [phone]  Relation: Other    Past Surgical History:  Past Surgical History:   Procedure Laterality Date    COLONOSCOPY  11    negative    UPPER GASTROINTESTINAL ENDOSCOPY  11    by Dr. Michael Bragg N/A 2019    [facility] performed by Gladis Gregorio [facility]       Immunization History:   Immunization History   Administered Date(s) Administered    Hepatitis B Adult (Engerix-B) 2014    Influenza Virus Vaccine 10/20/2010, 10/01/2011, 10/01/2012, 10/18/2013, 10/26/2014, 10/23/2015, 2016    PPD Test 2008, 2009, 2010, 2011, 2012, 2013, 2014, 2015, 2016, 2017    Pneumococcal Conjugate 13-valent (Ehuzdsq67) 2016    Pneumococcal Conjugate 7-valent (Prevnar7) 2006    Td, unspecified formulation 2006, 2017       Active Problems:  Patient Active Problem List   Diagnosis Code    Disorder of intellectual development F79    Diabetes mellitus due to underlying condition with stage 1 chronic kidney disease, without long-term current use of insulin (HCC) E08.22, N18.1    Seizure disorder ([facility] [address] G40.909    GERD (gastroesophageal reflux disease) K21.9    Chronic UTI N39.0    Osteopenia M85.80    Dermatitis L30.9    Eczema L30.9    Hypovitaminosis D E55.9    VANIA (iron deficiency anemia) D50.9    Chronic indwelling Ha catheter Z97.8    Essential hypertension I10    Full code status Z78.9    Dietary restriction Z71.3    Degenerative arthritis of hip M16.9    Neurogenic dysfunction of the urinary bladder N31.9    Foot deformity M21.969    Chronic ulcer of sacral region, with unspecified severity ([facility]) L98.429    Ulcer of perianal area, limited to breakdown of skin ([facility]) L98.491    Pressure injury of heel, stage 1 L89.601    Sacral wound, subsequent encounter S31.000D       Isolation/Infection:   Isolation          No Isolation        Patient Infection Status     None to display          Nurse Assessment:  Last Vital Signs: BP (!) 165/93   Pulse 83   Temp 98.3 °F (36.8 °C) (Temporal)   Resp 16     Last documented pain score (0-10 scale):    Last Weight:   Wt Readings from Last 1 Encounters:   09/11/20 155 lb (70.3 kg)     Mental Status:  {IP PT MENTAL STATUS:52316}    IV Access:  { NADIA IV ACCESS:036431835}    Nursing Mobility/ADLs:  Walking   {P DME WELLS:484901681}  Transfer  {P DME YFTQ:807400265}  Bathing  {P DME NUPZ:442340271}  Dressing  {P DME YTNM:032070447}  Toileting  {P DME FENC:983096676}  Feeding  {P DME WVFI:015550032}  Med Admin  {P DME XGHS:481402770}  Med Delivery   { NADIA MED Delivery:373135726}    Wound Care Documentation and Therapy:  Wound 09/17/20 Coccyx #1 (Active)   Wound Image   02/25/21 1021   Wound Etiology Pressure Stage  3 02/25/21 1012   Wound Cleansed Cleansed with saline 02/25/21 1021   Dressing/Treatment Collagen with Ag;Dry dressing 01/18/21 1542   Wound Length (cm) 1.4 cm 02/25/21 1021   Wound Width (cm) 0.6 cm 02/25/21 1021   Wound Depth (cm) 0.7 cm 02/25/21 1021   Wound Surface Area (cm^2) 0.84 cm^2 02/25/21 1021   Change in Wound Size % (l*w) 95.8 02/25/21 1021   Wound Volume (cm^3) 0.59 cm^3 02/25/21 1021   Wound Healing % 85 02/25/21 1021   Post-Procedure Length (cm) 3.8 cm 10/15/20 1037   Post-Procedure Width (cm) 4 cm 10/15/20 1037   Post-Procedure Depth (cm) 1.3 cm 10/15/20 1037   Post-Procedure Surface Area (cm^2) 15.2 cm^2 10/15/20 1037   Post-Procedure Volume (cm^3) 19.76 cm^3 10/15/20 1037   Distance Tunneling (cm) 1.6 cm 10/15/20 1034   Tunneling Position ___ O'Clock 1 10/15/20 1034   Undermining Starts ___ O'Clock 12 02/25/21 1021   Undermining Ends___ O'Clock 6 02/25/21 1021   Undermining Maxium Distance (cm) 0.4 02/25/21 1021   Wound Assessment Granulation tissue 02/25/21 1021   Drainage Amount Moderate 02/25/21 1021   Drainage Description Serosanguinous 02/25/21 1021   Odor None 02/25/21 1021   Sangeetha-wound Assessment Intact 02/25/21 1021   Margins Defined edges 02/25/21 1021   Wound Thickness Description not for Pressure Injury Full thickness 02/25/21 1012   Number of days: 161       Wound 02/25/21 #2 left posterior upper thigh (Active)   Wound Image   02/25/21 1021   Wound Etiology Pressure Unstageable 02/25/21 1021   Wound Cleansed Cleansed with saline 02/25/21 1021   Wound Length (cm) 2.5 cm 02/25/21 1021   Wound Width (cm) 2.1 cm 02/25/21 1021   Wound Depth (cm) 0.1 cm 02/25/21 1021   Wound Surface Area (cm^2) 5.25 cm^2 02/25/21 1021   Wound Volume (cm^3) 0.52 cm^3 02/25/21 1021   Wound Assessment Eschar dry;Granulation tissue 02/25/21 1021   Drainage Amount None 02/25/21 1021   Odor None 02/25/21 1021   Sangeetha-wound Assessment Fragile; Intact 02/25/21 1021   Margins Defined edges 02/25/21 1021   Number of days: 0        Elimination:  Continence:   · Bowel: {YES / DJ:95744}  · Bladder: {YES / KJ:12461}  Urinary Catheter: {Urinary Catheter:928424087}   Colostomy/Ileostomy/Ileal Conduit: {YES / RX:31670}       Date of Last BM: ***  No intake or output data in the 24 hours ending 21 1041  No intake/output data recorded.     Safety Concerns:     [address] Safety Concerns:594647733}    Impairments/Disabilities:      [address] Impairments/Disabilities:418369095}    Nutrition Therapy:  Current Nutrition Therapy:   Rashi Giron NADIA Diet List:441932673}    Routes of Feeding: {CHP DME Other Feedings:795125798}  Liquids: {Slp liquid thickness:51861}  Daily Fluid Restriction: {CHP DME Yes amt example:122143611}  Last Modified Barium Swallow with Video (Video Swallowing Test): {Done Not Done RLKQ:681961613}    Treatments at the Time of Hospital Discharge:   Respiratory Treatments: ***  Oxygen Therapy:  {Therapy; copd oxygen:29569}  Ventilator:    {MH CC Vent IJTV:395513478}    Rehab Therapies: {THERAPEUTIC INTERVENTION:0976032402}  Weight Bearing Status/Restrictions: Rashi Giron  Weight Bearin}  Other Medical Equipment (for information only, NOT a DME order):  {EQUIPMENT:227777940}  Other Treatments: ***    Patient's personal belongings (please select all that are sent with patient):  {CHP DME Belongings:912466320}    RN SIGNATURE:  {Esignature:736828179}    CASE MANAGEMENT/SOCIAL WORK SECTION    Inpatient Status Date: ***    Readmission Risk Assessment Score:  Readmission Risk              Risk of Unplanned Readmission:        0           Discharging to Facility/ Agency   · Name:   · Address:  · Phone:  · Fax:    Dialysis Facility (if applicable)   · Name:  · Address:  · Dialysis Schedule:  · Phone:  · Fax:    / signature: {Esignature:504029814}    PHYSICIAN SECTION    Prognosis: {Prognosis:5907994256}    Condition at Discharge: Rashi Giron Patient Condition:365348924}    Rehab Potential (if transferring to Rehab): {Prognosis:0430256622}    Recommended Labs or Other Treatments After Discharge: ***    Physician Certification: I certify the above information and transfer of Ken Biswas  is necessary for the continuing treatment of the diagnosis listed and that he requires {Admit to

## 2021-03-03 ENCOUNTER — APPOINTMENT (OUTPATIENT)
Dept: GENERAL RADIOLOGY | Age: 71
DRG: 466 | End: 2021-03-03
Payer: MEDICAID

## 2021-03-03 ENCOUNTER — HOSPITAL ENCOUNTER (INPATIENT)
Age: 71
LOS: 3 days | Discharge: HOME | DRG: 466 | End: 2021-03-06
Attending: INTERNAL MEDICINE | Admitting: INTERNAL MEDICINE
Payer: MEDICAID

## 2021-03-03 DIAGNOSIS — R73.9 HYPERGLYCEMIA: ICD-10-CM

## 2021-03-03 DIAGNOSIS — N30.00 ACUTE CYSTITIS WITHOUT HEMATURIA: Primary | ICD-10-CM

## 2021-03-03 DIAGNOSIS — E87.6 HYPOKALEMIA: ICD-10-CM

## 2021-03-03 PROBLEM — N39.0 UTI (URINARY TRACT INFECTION): Status: ACTIVE | Noted: 2021-03-03

## 2021-03-03 LAB
ALBUMIN SERPL-MCNC: 3.3 G/DL (ref 3.5–4.6)
ALP BLD-CCNC: 197 U/L (ref 35–104)
ALT SERPL-CCNC: 12 U/L (ref 0–41)
ANION GAP SERPL CALCULATED.3IONS-SCNC: 12 MEQ/L (ref 9–15)
ANION GAP SERPL CALCULATED.3IONS-SCNC: 14 MEQ/L (ref 9–15)
AST SERPL-CCNC: 14 U/L (ref 0–40)
BACTERIA: ABNORMAL /HPF
BASOPHILS ABSOLUTE: 0 K/UL (ref 0–0.2)
BASOPHILS RELATIVE PERCENT: 0.4 %
BILIRUB SERPL-MCNC: 0.3 MG/DL (ref 0.2–0.7)
BILIRUBIN URINE: NEGATIVE
BLOOD, URINE: ABNORMAL
BUN BLDV-MCNC: 17 MG/DL (ref 8–23)
BUN BLDV-MCNC: 19 MG/DL (ref 8–23)
CALCIUM SERPL-MCNC: 10 MG/DL (ref 8.5–9.9)
CALCIUM SERPL-MCNC: 9.4 MG/DL (ref 8.5–9.9)
CHLORIDE BLD-SCNC: 101 MEQ/L (ref 95–107)
CHLORIDE BLD-SCNC: 103 MEQ/L (ref 95–107)
CHP ED QC CHECK: YES
CLARITY: ABNORMAL
CO2: 33 MEQ/L (ref 20–31)
CO2: 34 MEQ/L (ref 20–31)
COLOR: YELLOW
CREAT SERPL-MCNC: 0.4 MG/DL (ref 0.7–1.2)
CREAT SERPL-MCNC: 0.51 MG/DL (ref 0.7–1.2)
EOSINOPHILS ABSOLUTE: 0 K/UL (ref 0–0.7)
EOSINOPHILS RELATIVE PERCENT: 0.2 %
EPITHELIAL CELLS, UA: ABNORMAL /HPF (ref 0–5)
GFR AFRICAN AMERICAN: >60
GFR AFRICAN AMERICAN: >60
GFR NON-AFRICAN AMERICAN: >60
GFR NON-AFRICAN AMERICAN: >60
GLOBULIN: 4.6 G/DL (ref 2.3–3.5)
GLUCOSE BLD-MCNC: 411 MG/DL (ref 60–115)
GLUCOSE BLD-MCNC: 426 MG/DL (ref 70–99)
GLUCOSE BLD-MCNC: 454 MG/DL
GLUCOSE BLD-MCNC: 454 MG/DL (ref 60–115)
GLUCOSE BLD-MCNC: 470 MG/DL (ref 70–99)
GLUCOSE URINE: >=1000 MG/DL
HCT VFR BLD CALC: 37.8 % (ref 42–52)
HEMOGLOBIN: 12 G/DL (ref 14–18)
HYALINE CASTS: ABNORMAL /HPF (ref 0–5)
KETONES, URINE: NEGATIVE MG/DL
LACTIC ACID: 1.6 MMOL/L (ref 0.5–2.2)
LEUKOCYTE ESTERASE, URINE: ABNORMAL
LYMPHOCYTES ABSOLUTE: 1.5 K/UL (ref 1–4.8)
LYMPHOCYTES RELATIVE PERCENT: 13.6 %
MAGNESIUM: 1.2 MG/DL (ref 1.7–2.4)
MAGNESIUM: 1.5 MG/DL (ref 1.7–2.4)
MCH RBC QN AUTO: 29.1 PG (ref 27–31.3)
MCHC RBC AUTO-ENTMCNC: 31.8 % (ref 33–37)
MCV RBC AUTO: 91.4 FL (ref 80–100)
MONOCYTES ABSOLUTE: 0.5 K/UL (ref 0.2–0.8)
MONOCYTES RELATIVE PERCENT: 4.9 %
NEUTROPHILS ABSOLUTE: 9 K/UL (ref 1.4–6.5)
NEUTROPHILS RELATIVE PERCENT: 80.9 %
NITRITE, URINE: POSITIVE
PDW BLD-RTO: 15.1 % (ref 11.5–14.5)
PERFORMED ON: ABNORMAL
PERFORMED ON: ABNORMAL
PH UA: 6.5 (ref 5–9)
PLATELET # BLD: 281 K/UL (ref 130–400)
POTASSIUM SERPL-SCNC: 2.5 MEQ/L (ref 3.4–4.9)
POTASSIUM SERPL-SCNC: 2.5 MEQ/L (ref 3.4–4.9)
PROCALCITONIN: 0.2 NG/ML (ref 0–0.15)
PROTEIN UA: ABNORMAL MG/DL
RBC # BLD: 4.13 M/UL (ref 4.7–6.1)
RBC UA: ABNORMAL /HPF (ref 0–5)
SARS-COV-2, NAAT: NOT DETECTED
SODIUM BLD-SCNC: 148 MEQ/L (ref 135–144)
SODIUM BLD-SCNC: 149 MEQ/L (ref 135–144)
SPECIFIC GRAVITY UA: 1.02 (ref 1–1.03)
TOTAL PROTEIN: 7.9 G/DL (ref 6.3–8)
URINE REFLEX TO CULTURE: YES
UROBILINOGEN, URINE: 0.2 E.U./DL
WBC # BLD: 11.1 K/UL (ref 4.8–10.8)
WBC UA: ABNORMAL /HPF (ref 0–5)

## 2021-03-03 PROCEDURE — 80053 COMPREHEN METABOLIC PANEL: CPT

## 2021-03-03 PROCEDURE — 83735 ASSAY OF MAGNESIUM: CPT

## 2021-03-03 PROCEDURE — 6360000002 HC RX W HCPCS: Performed by: INTERNAL MEDICINE

## 2021-03-03 PROCEDURE — 99284 EMERGENCY DEPT VISIT MOD MDM: CPT

## 2021-03-03 PROCEDURE — 87086 URINE CULTURE/COLONY COUNT: CPT

## 2021-03-03 PROCEDURE — 6360000002 HC RX W HCPCS: Performed by: PHYSICIAN ASSISTANT

## 2021-03-03 PROCEDURE — 6370000000 HC RX 637 (ALT 250 FOR IP): Performed by: INTERNAL MEDICINE

## 2021-03-03 PROCEDURE — 83605 ASSAY OF LACTIC ACID: CPT

## 2021-03-03 PROCEDURE — 87186 SC STD MICRODIL/AGAR DIL: CPT

## 2021-03-03 PROCEDURE — 85025 COMPLETE CBC W/AUTO DIFF WBC: CPT

## 2021-03-03 PROCEDURE — 1210000000 HC MED SURG R&B

## 2021-03-03 PROCEDURE — 36415 COLL VENOUS BLD VENIPUNCTURE: CPT

## 2021-03-03 PROCEDURE — 2580000003 HC RX 258: Performed by: INTERNAL MEDICINE

## 2021-03-03 PROCEDURE — 87635 SARS-COV-2 COVID-19 AMP PRB: CPT

## 2021-03-03 PROCEDURE — 6370000000 HC RX 637 (ALT 250 FOR IP): Performed by: PHYSICIAN ASSISTANT

## 2021-03-03 PROCEDURE — 71045 X-RAY EXAM CHEST 1 VIEW: CPT

## 2021-03-03 PROCEDURE — 2580000003 HC RX 258: Performed by: PHYSICIAN ASSISTANT

## 2021-03-03 PROCEDURE — 87077 CULTURE AEROBIC IDENTIFY: CPT

## 2021-03-03 PROCEDURE — 81001 URINALYSIS AUTO W/SCOPE: CPT

## 2021-03-03 PROCEDURE — 84145 PROCALCITONIN (PCT): CPT

## 2021-03-03 PROCEDURE — 96365 THER/PROPH/DIAG IV INF INIT: CPT

## 2021-03-03 RX ORDER — LISINOPRIL 20 MG/1
40 TABLET ORAL 2 TIMES DAILY
Status: DISCONTINUED | OUTPATIENT
Start: 2021-03-03 | End: 2021-03-03

## 2021-03-03 RX ORDER — SODIUM CHLORIDE 9 MG/ML
INJECTION, SOLUTION INTRAVENOUS CONTINUOUS
Status: DISCONTINUED | OUTPATIENT
Start: 2021-03-03 | End: 2021-03-05

## 2021-03-03 RX ORDER — SODIUM CHLORIDE 0.9 % (FLUSH) 0.9 %
10 SYRINGE (ML) INJECTION PRN
Status: DISCONTINUED | OUTPATIENT
Start: 2021-03-03 | End: 2021-03-06 | Stop reason: HOSPADM

## 2021-03-03 RX ORDER — FOLIC ACID 1 MG/1
1 TABLET ORAL DAILY
Status: DISCONTINUED | OUTPATIENT
Start: 2021-03-03 | End: 2021-03-06 | Stop reason: HOSPADM

## 2021-03-03 RX ORDER — SODIUM CHLORIDE 0.9 % (FLUSH) 0.9 %
10 SYRINGE (ML) INJECTION EVERY 12 HOURS SCHEDULED
Status: DISCONTINUED | OUTPATIENT
Start: 2021-03-03 | End: 2021-03-06 | Stop reason: HOSPADM

## 2021-03-03 RX ORDER — POTASSIUM CHLORIDE 7.45 MG/ML
10 INJECTION INTRAVENOUS PRN
Status: DISCONTINUED | OUTPATIENT
Start: 2021-03-03 | End: 2021-03-06 | Stop reason: HOSPADM

## 2021-03-03 RX ORDER — MAGNESIUM SULFATE IN WATER 40 MG/ML
2000 INJECTION, SOLUTION INTRAVENOUS PRN
Status: DISCONTINUED | OUTPATIENT
Start: 2021-03-03 | End: 2021-03-06 | Stop reason: HOSPADM

## 2021-03-03 RX ORDER — POTASSIUM CHLORIDE 20 MEQ/1
40 TABLET, EXTENDED RELEASE ORAL PRN
Status: DISCONTINUED | OUTPATIENT
Start: 2021-03-03 | End: 2021-03-06 | Stop reason: HOSPADM

## 2021-03-03 RX ORDER — POTASSIUM CHLORIDE 7.45 MG/ML
20 INJECTION INTRAVENOUS ONCE
Status: COMPLETED | OUTPATIENT
Start: 2021-03-03 | End: 2021-03-03

## 2021-03-03 RX ORDER — ASPIRIN 81 MG/1
81 TABLET, CHEWABLE ORAL DAILY
Status: DISCONTINUED | OUTPATIENT
Start: 2021-03-03 | End: 2021-03-06 | Stop reason: HOSPADM

## 2021-03-03 RX ORDER — 0.9 % SODIUM CHLORIDE 0.9 %
1000 INTRAVENOUS SOLUTION INTRAVENOUS ONCE
Status: COMPLETED | OUTPATIENT
Start: 2021-03-03 | End: 2021-03-03

## 2021-03-03 RX ORDER — DEXTROSE MONOHYDRATE 25 G/50ML
12.5 INJECTION, SOLUTION INTRAVENOUS PRN
Status: DISCONTINUED | OUTPATIENT
Start: 2021-03-03 | End: 2021-03-06 | Stop reason: HOSPADM

## 2021-03-03 RX ORDER — ONDANSETRON 2 MG/ML
4 INJECTION INTRAMUSCULAR; INTRAVENOUS EVERY 6 HOURS PRN
Status: DISCONTINUED | OUTPATIENT
Start: 2021-03-03 | End: 2021-03-06 | Stop reason: HOSPADM

## 2021-03-03 RX ORDER — FERROUS SULFATE 325(65) MG
325 TABLET ORAL 2 TIMES DAILY
Status: DISCONTINUED | OUTPATIENT
Start: 2021-03-03 | End: 2021-03-06 | Stop reason: HOSPADM

## 2021-03-03 RX ORDER — CETIRIZINE HYDROCHLORIDE 10 MG/1
10 TABLET ORAL DAILY
Status: DISCONTINUED | OUTPATIENT
Start: 2021-03-03 | End: 2021-03-06 | Stop reason: HOSPADM

## 2021-03-03 RX ORDER — CLONIDINE HYDROCHLORIDE 0.1 MG/1
0.1 TABLET ORAL 3 TIMES DAILY
Status: DISCONTINUED | OUTPATIENT
Start: 2021-03-03 | End: 2021-03-06 | Stop reason: HOSPADM

## 2021-03-03 RX ORDER — DEXTROSE MONOHYDRATE 50 MG/ML
100 INJECTION, SOLUTION INTRAVENOUS PRN
Status: DISCONTINUED | OUTPATIENT
Start: 2021-03-03 | End: 2021-03-06 | Stop reason: HOSPADM

## 2021-03-03 RX ORDER — POLYETHYLENE GLYCOL 3350 17 G/17G
17 POWDER, FOR SOLUTION ORAL DAILY PRN
Status: DISCONTINUED | OUTPATIENT
Start: 2021-03-03 | End: 2021-03-06 | Stop reason: HOSPADM

## 2021-03-03 RX ORDER — NICOTINE POLACRILEX 4 MG
15 LOZENGE BUCCAL PRN
Status: DISCONTINUED | OUTPATIENT
Start: 2021-03-03 | End: 2021-03-06 | Stop reason: HOSPADM

## 2021-03-03 RX ORDER — PANTOPRAZOLE SODIUM 40 MG/1
40 TABLET, DELAYED RELEASE ORAL DAILY
Status: DISCONTINUED | OUTPATIENT
Start: 2021-03-03 | End: 2021-03-06 | Stop reason: HOSPADM

## 2021-03-03 RX ORDER — ATORVASTATIN CALCIUM 10 MG/1
10 TABLET, FILM COATED ORAL DAILY
Status: DISCONTINUED | OUTPATIENT
Start: 2021-03-03 | End: 2021-03-06 | Stop reason: HOSPADM

## 2021-03-03 RX ORDER — ACETAMINOPHEN 650 MG/1
650 SUPPOSITORY RECTAL EVERY 6 HOURS PRN
Status: DISCONTINUED | OUTPATIENT
Start: 2021-03-03 | End: 2021-03-06 | Stop reason: HOSPADM

## 2021-03-03 RX ORDER — PROMETHAZINE HYDROCHLORIDE 12.5 MG/1
12.5 TABLET ORAL EVERY 6 HOURS PRN
Status: DISCONTINUED | OUTPATIENT
Start: 2021-03-03 | End: 2021-03-06 | Stop reason: HOSPADM

## 2021-03-03 RX ORDER — LOPERAMIDE HYDROCHLORIDE 2 MG/1
2 CAPSULE ORAL 4 TIMES DAILY PRN
Status: DISCONTINUED | OUTPATIENT
Start: 2021-03-03 | End: 2021-03-06 | Stop reason: HOSPADM

## 2021-03-03 RX ORDER — INSULIN GLARGINE 100 [IU]/ML
0.25 INJECTION, SOLUTION SUBCUTANEOUS NIGHTLY
Status: DISCONTINUED | OUTPATIENT
Start: 2021-03-03 | End: 2021-03-04

## 2021-03-03 RX ORDER — MAGNESIUM SULFATE IN WATER 40 MG/ML
2000 INJECTION, SOLUTION INTRAVENOUS ONCE
Status: COMPLETED | OUTPATIENT
Start: 2021-03-03 | End: 2021-03-03

## 2021-03-03 RX ORDER — BUSPIRONE HYDROCHLORIDE 10 MG/1
15 TABLET ORAL 3 TIMES DAILY
Status: DISCONTINUED | OUTPATIENT
Start: 2021-03-03 | End: 2021-03-06 | Stop reason: HOSPADM

## 2021-03-03 RX ORDER — LISINOPRIL 20 MG/1
40 TABLET ORAL DAILY
Status: DISCONTINUED | OUTPATIENT
Start: 2021-03-03 | End: 2021-03-06 | Stop reason: HOSPADM

## 2021-03-03 RX ORDER — LANOLIN ALCOHOL/MO/W.PET/CERES
25 CREAM (GRAM) TOPICAL DAILY
Status: DISCONTINUED | OUTPATIENT
Start: 2021-03-03 | End: 2021-03-06 | Stop reason: HOSPADM

## 2021-03-03 RX ORDER — AMLODIPINE BESYLATE 5 MG/1
5 TABLET ORAL DAILY
Status: DISCONTINUED | OUTPATIENT
Start: 2021-03-03 | End: 2021-03-06 | Stop reason: HOSPADM

## 2021-03-03 RX ORDER — ACETAMINOPHEN 325 MG/1
650 TABLET ORAL EVERY 6 HOURS PRN
Status: DISCONTINUED | OUTPATIENT
Start: 2021-03-03 | End: 2021-03-06 | Stop reason: HOSPADM

## 2021-03-03 RX ORDER — CHOLECALCIFEROL (VITAMIN D3) 125 MCG
1000 CAPSULE ORAL DAILY
Status: DISCONTINUED | OUTPATIENT
Start: 2021-03-03 | End: 2021-03-06 | Stop reason: HOSPADM

## 2021-03-03 RX ADMIN — CEFTRIAXONE SODIUM 1000 MG: 1 INJECTION, POWDER, FOR SOLUTION INTRAMUSCULAR; INTRAVENOUS at 15:18

## 2021-03-03 RX ADMIN — LEVETIRACETAM 750 MG: 500 TABLET ORAL at 22:45

## 2021-03-03 RX ADMIN — SODIUM CHLORIDE 1000 ML: 9 INJECTION, SOLUTION INTRAVENOUS at 17:04

## 2021-03-03 RX ADMIN — INSULIN LISPRO 4 UNITS: 100 INJECTION, SOLUTION INTRAVENOUS; SUBCUTANEOUS at 19:55

## 2021-03-03 RX ADMIN — CETIRIZINE HYDROCHLORIDE 10 MG: 10 TABLET, FILM COATED ORAL at 23:00

## 2021-03-03 RX ADMIN — SODIUM CHLORIDE: 9 INJECTION, SOLUTION INTRAVENOUS at 21:30

## 2021-03-03 RX ADMIN — SODIUM CHLORIDE, PRESERVATIVE FREE 10 ML: 5 INJECTION INTRAVENOUS at 22:57

## 2021-03-03 RX ADMIN — POTASSIUM CHLORIDE 10 MEQ: 7.46 INJECTION, SOLUTION INTRAVENOUS at 23:51

## 2021-03-03 RX ADMIN — POTASSIUM CHLORIDE 20 MEQ: 7.46 INJECTION, SOLUTION INTRAVENOUS at 15:22

## 2021-03-03 RX ADMIN — ATORVASTATIN CALCIUM 10 MG: 10 TABLET, FILM COATED ORAL at 22:50

## 2021-03-03 RX ADMIN — MAGNESIUM SULFATE HEPTAHYDRATE 2000 MG: 40 INJECTION, SOLUTION INTRAVENOUS at 17:02

## 2021-03-03 RX ADMIN — CLONIDINE HYDROCHLORIDE 0.1 MG: 0.1 TABLET ORAL at 22:44

## 2021-03-03 RX ADMIN — POTASSIUM CHLORIDE 10 MEQ: 7.46 INJECTION, SOLUTION INTRAVENOUS at 22:50

## 2021-03-03 RX ADMIN — BUSPIRONE HYDROCHLORIDE 15 MG: 10 TABLET ORAL at 22:45

## 2021-03-03 RX ADMIN — LOPERAMIDE HYDROCHLORIDE 2 MG: 2 CAPSULE ORAL at 22:51

## 2021-03-03 RX ADMIN — INSULIN GLARGINE 11 UNITS: 100 INJECTION, SOLUTION SUBCUTANEOUS at 22:49

## 2021-03-03 RX ADMIN — FERROUS SULFATE TAB 325 MG (65 MG ELEMENTAL FE) 325 MG: 325 (65 FE) TAB at 22:50

## 2021-03-03 RX ADMIN — POTASSIUM BICARBONATE 20 MEQ: 782 TABLET, EFFERVESCENT ORAL at 15:18

## 2021-03-03 RX ADMIN — SODIUM CHLORIDE 1000 ML: 9 INJECTION, SOLUTION INTRAVENOUS at 14:15

## 2021-03-03 RX ADMIN — ENOXAPARIN SODIUM 40 MG: 40 INJECTION SUBCUTANEOUS at 22:46

## 2021-03-03 NOTE — ED NOTES
Bed: 12  Expected date: 3/3/21  Expected time: 12:33 PM  Means of arrival: [facility]  Comments:  73 M Hyperglycemia      Cheryl Tolentino RN  03/03/21 0439

## 2021-03-03 NOTE — ED TRIAGE NOTES
Pt in with c/o hyperglycemia. 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 Pt is alert at baseline. Nonverbal, nonambulatory.

## 2021-03-03 NOTE — ED PROVIDER NOTES
[facility] ED  eMERGENCY dEPARTMENTeNCOUnter      Pt Name: Chavez Mendez  MRN: [id number]  Armstrongfmichelle 1950  Date ofevaluation: 3/3/2021  Provider: Shawna Davis PA-C    CHIEF COMPLAINT       Chief Complaint   Patient presents with    Hyperglycemia         HISTORY OF PRESENT ILLNESS   (Location/Symptom, Timing/Onset,Context/Setting, Quality, Duration, Modifying Factors, Severity)  Note limiting factors. Chavez Mendez is a 78 y.o. male who presents to the emergency department  Hyperglycemia for the last 2-3 days. Not eating much. Pt is nonverbal h/o obtained from nursing. HPI    NursingNotes were reviewed. REVIEW OF SYSTEMS    (2-9 systems for level 4, 10 or more for level 5)     Review of Systems   Unable to perform ROS: Patient nonverbal       Except as noted above the remainder of the review of systems was reviewed and negative.        PAST MEDICAL HISTORY     Past Medical History:   Diagnosis Date    Anemia     Chronic indwelling Ha catheter     Chronic UTI     Chronic UTI     Dermatitis     DM (diabetes mellitus) ([address]     DM type 2 (diabetes mellitus, type 2) ([address] 10/29/2013    Eczema     Gastric polyp     GERD (gastroesophageal reflux disease)     H/O bone density study 9.2.11    lumbar spine, lt femoral neck osteopenia, rt femoral neck normal    High blood pressure     HTN (hypertension)     Hypokalemia     Hypovitaminosis D     VANIA (iron deficiency anemia)     MR (mental retardation)     Neurogenic dysfunction of the urinary bladder 1/22/2019    Osteopenia     Pressure ulcer of coccygeal region, unstageable ([address] 09/17/2020    Seizure disorder ([address]     Wound of back          SURGICALHISTORY       Past Surgical History:   Procedure Laterality Date    COLONOSCOPY  8.19.11    negative    UPPER GASTROINTESTINAL ENDOSCOPY  8.19.11    by Dr. Cheryl Felix N/A 11/7/2019    EGD performed by Hebert Silva MD at [facility] CURRENT MEDICATIONS       Previous Medications    ACETAMINOPHEN (TYLENOL) 325 MG TABLET    Take 325 mg by mouth daily    AMLODIPINE (NORVASC) 5 MG TABLET    Take 5 mg by mouth daily    AMMONIUM LACTATE (LAC-HYDRIN) 12 % LOTION    Apply topically as needed for Dry Skin Apply topically as needed. ASPIRIN 81 MG CHEWABLE TABLET    Take 81 mg by mouth daily    ATORVASTATIN (LIPITOR) 10 MG TABLET    Take 10 mg by mouth daily    BUSPIRONE (BUSPAR) 15 MG TABLET    Take 15 mg by mouth 3 times daily    CALCIUM CARBONATE (OSCAL) 500 MG TABS TABLET    Take 500 mg by mouth 2 times daily    CHOLECALCIFEROL (VITAMIN D3) 2000 UNITS CAPS    Take by mouth Daily with lunch    CLONIDINE (CATAPRES) 0.1 MG TABLET    Take 0.1 mg by mouth 3 times daily    CRANBERRY 500 MG CAPS    Take 500 mg by mouth 2 times daily    ELASTIC BANDAGES & SUPPORTS (T.E.D. KNEE LENGTH/M-REGULAR) MISC    1 set of MINERVA stockings. Apply daily while upright in wheelchair as needed for dependent edema control. Pharmacy/ DME may adjust size as appropriate with corrected measurement    FERROUS SULFATE 325 (65 FE) MG TABLET    Take 325 mg by mouth 2 times daily    FOLIC ACID (FOLVITE) 1 MG TABLET    Take 1 mg by mouth daily    JANUVIA 100 MG TABLET    TAKE ONE (1) TABLET BY MOUTH ONCE (1) DAILY    LANCET DEVICE MISC    1 Device by Does not apply route once for 1 dose    LEVETIRACETAM (KEPPRA) 750 MG TABLET    Take 750 mg by mouth 2 times daily    LISINOPRIL (PRINIVIL;ZESTRIL) 40 MG TABLET    Take 40 mg by mouth 2 times daily    LOPERAMIDE (IMODIUM) 2 MG CAPSULE    Take 2 mg by mouth 4 times daily as needed for Diarrhea    LORATADINE (CLARITIN) 10 MG TABLET    TAKE ONE (1) TABLET BY MOUTH ONCE (1) DAILY (PRURITIC DERMATITUS)    METFORMIN (GLUCOPHAGE) 500 MG TABLET    Take 750 mg by mouth 2 times daily (with meals)    [facility].  DEVICES [facility]) MISC    Manual wheel chair with foot petals  A50.49    MULTIPLE VITAMIN (MULTIVITAMINS PO)    Take by mouth daily Physically abused: None     Forced sexual activity: None   Other Topics Concern    None   Social History Narrative    None       SCREENINGS      @FLOW([id number])@      PHYSICAL EXAM    (up to 7 for level 4, 8 or more for level 5)     ED Triage Vitals [03/03/21 1253]   BP Temp Temp Source Pulse Resp SpO2 Height Weight   (!) 177/91 97.7 °F (36.5 °C) Oral 72 16 100 % 5' 10\" (1.778 m) 100 lb (45.4 kg)       Physical Exam  Vitals signs and nursing note reviewed. Constitutional:       General: He is not in acute distress. Appearance: Normal appearance. He is well-developed. He is not diaphoretic. HENT:      Head: Normocephalic and atraumatic. Eyes:      General: Lids are normal.      Conjunctiva/sclera: Conjunctivae normal.   Neck:      Musculoskeletal: Normal range of motion and neck supple. Cardiovascular:      Rate and Rhythm: Normal rate and regular rhythm. Pulses: Normal pulses. Heart sounds: Normal heart sounds. Pulmonary:      Effort: Pulmonary effort is normal.      Breath sounds: Normal breath sounds. Abdominal:      General: Bowel sounds are normal.      Palpations: Abdomen is soft. Tenderness: There is no abdominal tenderness. Genitourinary:     Comments: Ha, yellow urine  Lymphadenopathy:      Cervical: No cervical adenopathy. Skin:     General: Skin is warm and dry. Capillary Refill: Capillary refill takes less than 2 seconds. Findings: No rash. Neurological:      Mental Status: He is alert and oriented to person, place, and time. Psychiatric:         Thought Content:  Thought content normal.         Judgment: Judgment normal.         DIAGNOSTIC RESULTS     EKG: All EKG's are interpreted by the Emergency Department Physician who either signs or Co-signsthis chart in the absence of a cardiologist.        RADIOLOGY:   Non-plain filmimages such as CT, Ultrasound and MRI are read by the radiologist. Plain radiographic images are visualized and preliminarily (*)     All other components within normal limits   POCT GLUCOSE - Abnormal; Notable for the following components:    POC Glucose 454 (*)     All other components within normal limits   POCT GLUCOSE - Normal   COVID-19, RAPID   CULTURE, URINE   LACTIC ACID, PLASMA       All other labs were within normal range or not returned as of this dictation. EMERGENCY DEPARTMENT COURSE and DIFFERENTIAL DIAGNOSIS/MDM:   Vitals:    Vitals:    03/03/21 1415 03/03/21 1430 03/03/21 1500 03/03/21 1530   BP:  (!) 181/104 (!) 163/99 (!) 145/93   Pulse: 80 83 83 86   Resp: 13 16 15 19   Temp:       TempSrc:       SpO2: 98%   98%   Weight:       Height:               MDM    Pt has hyperglycemia, hypokalemia and uti. Pt vitals are stable. Given 2L of fluids, K replaced iv and po and started on rocephin for uti. procalc elevated. Dr. Nina Christensen accepted the patient. REASSESSMENT          CRITICAL CARE TIME   Total Critical Care time was  minutes, excluding separatelyreportable procedures. There was a high probability ofclinically significant/life threatening deterioration in the patient's condition which required my urgent intervention. CONSULTS:  None    PROCEDURES:  Unless otherwise noted below, none     Procedures    FINAL IMPRESSION      1. Acute cystitis without hematuria    2. Hypokalemia    3. Hyperglycemia          DISPOSITION/PLAN   DISPOSITION Admitted 03/03/2021 03:57:31 PM      PATIENT REFERREDTO:  No follow-up provider specified.     DISCHARGEMEDICATIONS:  New Prescriptions    No medications on file          (Please note that portions of this note were completed with a voice recognition program.  Efforts were made to edit the dictations but occasionally words are mis-transcribed.)    Anthony Mcgrath PA-C (electronically signed)  Attending Emergency Physician         Anthony Mcgrath PA-C  03/03/21 1610    Attending Supervisory Note/Shared Visit   I have personally performed a face to face diagnostic evaluation on this patient. I have reviewed the mid-levels findings and agree.   History and Exam by me shows acute cystitis      Brynn Sanz DO  Attending Emergency Physician         Brynn Sanz DO  03/03/21 6076

## 2021-03-03 NOTE — H&P
History and Physical    Admit Date: 3/3/2021  PCP: Mahendra Mei MD    CHIEF COMPLAINT:    Chief Complaint   Patient presents with    Hyperglycemia        HISTORY OF PRESENT ILLNESS:    The patient is a 78 y.o. nonverbal MRDD male with a past medical history of diabetes, chronic Ha, HTN, seizure disorder presents with hyperglycemia. Patient is a poor historian due to MRDD, history obtained by talking to emergency room provider and reviewing medical chart. Glucose is above 450 and he was noted to have severe hypokalemia and hypomagnesemia. His potassium is 2.5 and his magnesium is 1.2. Currently being repleted in the emergency room. His UA is consistent with UTI. He has a chronic Ha catheter for neurogenic bladder.     Past Medical History:        Diagnosis Date    Anemia     Chronic indwelling Ha catheter     Chronic UTI     Chronic UTI     Dermatitis     DM [facility]diabetes mellitus) [facility]     DM type 2 (diabetes mellitus, type 2) ([facility] 10/29/2013    Eczema     Gastric polyp     GERD (gastroesophageal reflux disease)     H/O bone density study 9.2.11    lumbar spine, lt femoral neck osteopenia, rt femoral neck normal    High blood pressure     HTN (hypertension)     Hypokalemia     Hypovitaminosis D     VANIA (iron deficiency anemia)     MR (mental retardation)     Neurogenic dysfunction of the urinary bladder 1/22/2019    Osteopenia     Pressure ulcer of coccygeal region, unstageable ([address] 09/17/2020    Seizure disorder ([address]     Wound of back        Past Surgical History:        Procedure Laterality Date    COLONOSCOPY  8.19.11    negative    UPPER GASTROINTESTINAL ENDOSCOPY  8.19.11    by Dr. Aviva Jo 11/7/2019    EGD performed by Milton Guerra MD at [facility] [address] History:   Social History     Socioeconomic History    Marital status: Single     Spouse name: Not on file    Number of children: Not on file    Years of education: Not on file    Highest education level: Not on file   Occupational History    Not on file   Social Needs    Financial resource strain: Not on file    Food insecurity     Worry: Not on file     Inability: Not on file    Transportation needs     Medical: Not on file     Non-medical: Not on file   Tobacco Use    Smoking status: Never Smoker    Smokeless tobacco: Never Used   Substance and Sexual Activity    Alcohol use: No    Drug use: No    Sexual activity: Never   Lifestyle    Physical activity     Days per week: Not on file     Minutes per session: Not on file    Stress: Not on file   Relationships    Social connections     Talks on phone: Not on file     Gets together: Not on file     Attends Gnosticist service: Not on file     Active member of club or organization: Not on file     Attends meetings of clubs or organizations: Not on file     Relationship status: Not on file    Intimate partner violence     Fear of current or ex partner: Not on file     Emotionally abused: Not on file     Physically abused: Not on file     Forced sexual activity: Not on file   Other Topics Concern    Not on file   Social History Narrative    Not on file       Family History:   History reviewed. No pertinent family history. Medications Prior to Admission:    Prior to Admission medications    Medication Sig Start Date End Date Taking?  Authorizing Provider   sertraline (ZOLOFT) 100 MG tablet TAKE ONE (1) TABLET BY MOUTH ONCE (1) DAILY IN THE MORNING (ANTI-DEPRESSANT) 11/23/20   Satinder Escobedo MD   loratadine (CLARITIN) 10 MG tablet TAKE ONE (1) TABLET BY MOUTH ONCE (1) DAILY (PRURITIC DERMATITUS) 11/3/20   Satinder Escobedo MD   vitamin B-12 (CYANOCOBALAMIN) 1000 MCG tablet Take 1,000 mcg by mouth daily    Historical Provider, MD   JANUVIA 100 MG tablet TAKE ONE (1) TABLET BY MOUTH ONCE (1) DAILY 9/4/20   Satinder Escobedo MD   SITagliptin (JANUVIA) 100 MG tablet TAKE ONE (1) TABLET BY MOUTH ONCE (1) DAILY 9/3/20   Mecca Madrid MD   loperamide (IMODIUM) 2 MG capsule Take 2 mg by mouth 4 times daily as needed for Diarrhea    Historical Provider, MD   Na Sulfate-K Sulfate-Mg Sulf 17.5-3.13-1.6 GM/177ML SOLN As directed 10/3/19   Lorri Tovar MD   Lancet Device MISC 1 Device by Does not apply route once for 1 dose 9/4/19 9/17/20  Mecca Madrid MD   ammonium lactate (LAC-HYDRIN) 12 % lotion Apply topically as needed for Dry Skin Apply topically as needed. Historical Provider, MD   aspirin 81 MG chewable tablet Take 81 mg by mouth daily    Historical Provider, MD   folic acid (FOLVITE) 1 MG tablet Take 1 mg by mouth daily    Historical Provider, MD Diaz. Devices [facility]) MISC Manual wheel chair with foot petals  A50.49 8/28/19   Mecca Madrid MD   Elastic Bandages & Supports (T.E.D. KNEE LENGTH/M-REGULAR) MISC 1 set of MINERVA stockings. Apply daily while upright in wheelchair as needed for dependent edema control.   Pharmacy/ DME may adjust size as appropriate with corrected measurement 6/30/19   Ahmet Rai APRN - CNP   amLODIPine (NORVASC) 5 MG tablet Take 5 mg by mouth daily    Historical Provider, MD   acetaminophen (TYLENOL) 325 MG tablet Take 325 mg by mouth daily    Historical Provider, MD   atorvastatin (LIPITOR) 10 MG tablet Take 10 mg by mouth daily    Historical Provider, MD   busPIRone (BUSPAR) 15 MG tablet Take 15 mg by mouth 3 times daily    Historical Provider, MD   cloNIDine (CATAPRES) 0.1 MG tablet Take 0.1 mg by mouth 3 times daily    Historical Provider, MD   Cranberry 500 MG CAPS Take 500 mg by mouth 2 times daily    Historical Provider, MD   ferrous sulfate 325 (65 Fe) MG tablet Take 325 mg by mouth 2 times daily    Historical Provider, MD   levETIRAcetam (KEPPRA) 750 MG tablet Take 750 mg by mouth 2 times daily    Historical Provider, MD   lisinopril (PRINIVIL;ZESTRIL) 40 MG tablet Take 40 mg by mouth 2 times daily    Historical Provider, MD   metFORMIN (GLUCOPHAGE) 500 MG tablet Take 750 mg by mouth 2 times daily (with meals)    Historical Provider, MD   Multiple Vitamin (MULTIVITAMINS PO) Take by mouth daily    Historical Provider, MD   calcium carbonate (OSCAL) 500 MG TABS tablet Take 500 mg by mouth 2 times daily    Historical Provider, MD   pantoprazole (PROTONIX) 40 MG tablet Take 40 mg by mouth daily    Historical Provider, MD   pioglitazone (ACTOS) 30 MG tablet Take 45 mg by mouth daily     Historical Provider, MD   Pyridoxine HCl (VITAMIN B-6) 50 MG tablet Take 25 mg by mouth daily    Historical Provider, MD   Cholecalciferol (VITAMIN D3) 2000 units CAPS Take by mouth Daily with lunch    Historical Provider, MD   ondansetron (ZOFRAN) 4 MG tablet Take 4 mg by mouth every 6 hours as needed for Nausea or Vomiting    Historical Provider, MD   promethazine (PHENERGAN) 25 MG suppository Place 25 mg rectally every 6 hours as needed for Nausea    Historical Provider, MD       Allergies:  Patient has no known allergies. REVIEW OF SYSTEMS:  All systems reviewed and negative except for what is in HPI. PHYSICAL EXAM:  Vitals:  BP (!) 145/93   Pulse 86   Temp 97.7 °F (36.5 °C) (Oral)   Resp 19   Ht 5' 10\" (1.778 m)   Wt 100 lb (45.4 kg)   SpO2 98%   BMI 14.35 kg/m²   BMI Classification: Underweight (BMI <18.5)  Pulse Ox: SpO2  Av %  Min: 91 %  Max: 100 %  Supplemental O2:      CONSTITUTIONAL:  awake, alert, cooperative, no apparent distress, and appears stated age  HEENT: Normocephalic, PERRLA  NECK: no JVD, no LAD  HEART: RRR, no murmurs, gallops, or rubs  LUNGS: clear to auscultation bilaterally, no wheezes, crackles, or rhonchi.   ABDOMEN: soft/NT/ND, positive BS  MUSCULOSKELETAL: negative for edema, +2 pulses  SKIN: intact without rash or jaundice  NEURO: Nonverbal, nonfocal exam.  Ha catheter in place, has a leg bag    DATA:  Recent Results (from the past 24 hour(s))   Urine Reflex to Culture    Collection Time: 21  1:30 PM    Specimen: Eosinophils % 0.2 %    Basophils % 0.4 %    Neutrophils Absolute 9.0 (H) 1.4 - 6.5 K/uL    Lymphocytes Absolute 1.5 1.0 - 4.8 K/uL    Monocytes Absolute 0.5 0.2 - 0.8 K/uL    Eosinophils Absolute 0.0 0.0 - 0.7 K/uL    Basophils Absolute 0.0 0.0 - 0.2 K/uL   Magnesium    Collection Time: 03/03/21  1:36 PM   Result Value Ref Range    Magnesium 1.2 (L) 1.7 - 2.4 mg/dL   Lactic Acid, Plasma    Collection Time: 03/03/21  1:36 PM   Result Value Ref Range    Lactic Acid 1.6 0.5 - 2.2 mmol/L   PROCALCITONIN    Collection Time: 03/03/21  1:36 PM   Result Value Ref Range    Procalcitonin 0.20 (H) 0.00 - 0.15 ng/mL   POCT Glucose    Collection Time: 03/03/21  1:53 PM   Result Value Ref Range    POC Glucose 454 (HH) 60 - 115 mg/dl    Performed on ACCU-CHEK    POCT Glucose    Collection Time: 03/03/21  1:54 PM   Result Value Ref Range    Glucose 454 mg/dL    QC OK?  yes    COVID-19, Rapid    Collection Time: 03/03/21  2:44 PM    Specimen: Nasopharyngeal Swab   Result Value Ref Range    SARS-CoV-2, NAAT Not Detected Not Detected           ASSESSMENT AND PLAN:    Catheter associated UTI present on admission  Severe hypokalemia  Severe hypomagnesemia  MRDD status  History of seizure disorder  HTN  Diabetes  Hyperlipidemia    3/3/2021  -We will start the patient on IV Rocephin, follow urine and blood cultures  -Replete electrolytes, recheck BMP and magnesium this evening and replete as needed  -Resume home medication fluid in his antiepileptic medication.  -Diabetic diet  -DVT prophylaxis         DISCHARGE PLANNING  Inpatient    Code status: Code  Electronically signed by Philippe Padilla DO on 3/3/21 at 4:41 PM EST

## 2021-03-04 LAB
ANION GAP SERPL CALCULATED.3IONS-SCNC: 13 MEQ/L (ref 9–15)
BASOPHILS ABSOLUTE: 0.1 K/UL (ref 0–0.2)
BASOPHILS RELATIVE PERCENT: 0.8 %
BUN BLDV-MCNC: 21 MG/DL (ref 8–23)
CALCIUM SERPL-MCNC: 9.3 MG/DL (ref 8.5–9.9)
CHLORIDE BLD-SCNC: 106 MEQ/L (ref 95–107)
CO2: 29 MEQ/L (ref 20–31)
CREAT SERPL-MCNC: 0.4 MG/DL (ref 0.7–1.2)
EOSINOPHILS ABSOLUTE: 0 K/UL (ref 0–0.7)
EOSINOPHILS RELATIVE PERCENT: 0 %
GFR AFRICAN AMERICAN: >60
GFR NON-AFRICAN AMERICAN: >60
GLUCOSE BLD-MCNC: 129 MG/DL (ref 60–115)
GLUCOSE BLD-MCNC: 159 MG/DL (ref 60–115)
GLUCOSE BLD-MCNC: 170 MG/DL (ref 60–115)
GLUCOSE BLD-MCNC: 211 MG/DL (ref 60–115)
GLUCOSE BLD-MCNC: 36 MG/DL (ref 60–115)
GLUCOSE BLD-MCNC: 36 MG/DL (ref 70–99)
GLUCOSE BLD-MCNC: 40 MG/DL (ref 60–115)
GLUCOSE BLD-MCNC: 45 MG/DL (ref 60–115)
GLUCOSE BLD-MCNC: 77 MG/DL (ref 60–115)
GLUCOSE BLD-MCNC: 80 MG/DL (ref 60–115)
HBA1C MFR BLD: 8.8 % (ref 4.8–5.9)
HCT VFR BLD CALC: 33.2 % (ref 42–52)
HEMOGLOBIN: 10.8 G/DL (ref 14–18)
LYMPHOCYTES ABSOLUTE: 1.6 K/UL (ref 1–4.8)
LYMPHOCYTES RELATIVE PERCENT: 16.4 %
MAGNESIUM: 1.5 MG/DL (ref 1.7–2.4)
MCH RBC QN AUTO: 29.3 PG (ref 27–31.3)
MCHC RBC AUTO-ENTMCNC: 32.7 % (ref 33–37)
MCV RBC AUTO: 89.5 FL (ref 80–100)
MONOCYTES ABSOLUTE: 0.5 K/UL (ref 0.2–0.8)
MONOCYTES RELATIVE PERCENT: 4.9 %
NEUTROPHILS ABSOLUTE: 7.7 K/UL (ref 1.4–6.5)
NEUTROPHILS RELATIVE PERCENT: 77.9 %
PDW BLD-RTO: 14.7 % (ref 11.5–14.5)
PERFORMED ON: ABNORMAL
PERFORMED ON: NORMAL
PERFORMED ON: NORMAL
PLATELET # BLD: 264 K/UL (ref 130–400)
POTASSIUM REFLEX MAGNESIUM: 3.1 MEQ/L (ref 3.4–4.9)
RBC # BLD: 3.7 M/UL (ref 4.7–6.1)
SODIUM BLD-SCNC: 148 MEQ/L (ref 135–144)
WBC # BLD: 9.9 K/UL (ref 4.8–10.8)

## 2021-03-04 PROCEDURE — 99214 OFFICE O/P EST MOD 30 MIN: CPT

## 2021-03-04 PROCEDURE — 2580000003 HC RX 258: Performed by: INTERNAL MEDICINE

## 2021-03-04 PROCEDURE — 1210000000 HC MED SURG R&B

## 2021-03-04 PROCEDURE — 83735 ASSAY OF MAGNESIUM: CPT

## 2021-03-04 PROCEDURE — 6370000000 HC RX 637 (ALT 250 FOR IP): Performed by: INTERNAL MEDICINE

## 2021-03-04 PROCEDURE — 6360000002 HC RX W HCPCS: Performed by: INTERNAL MEDICINE

## 2021-03-04 PROCEDURE — 80048 BASIC METABOLIC PNL TOTAL CA: CPT

## 2021-03-04 PROCEDURE — 85025 COMPLETE CBC W/AUTO DIFF WBC: CPT

## 2021-03-04 PROCEDURE — 99222 1ST HOSP IP/OBS MODERATE 55: CPT | Performed by: INTERNAL MEDICINE

## 2021-03-04 PROCEDURE — 83036 HEMOGLOBIN GLYCOSYLATED A1C: CPT

## 2021-03-04 PROCEDURE — 36415 COLL VENOUS BLD VENIPUNCTURE: CPT

## 2021-03-04 RX ORDER — DEXTROSE MONOHYDRATE 50 MG/ML
INJECTION, SOLUTION INTRAVENOUS CONTINUOUS
Status: DISPENSED | OUTPATIENT
Start: 2021-03-04 | End: 2021-03-04

## 2021-03-04 RX ORDER — MAGNESIUM SULFATE IN WATER 40 MG/ML
2000 INJECTION, SOLUTION INTRAVENOUS ONCE
Status: COMPLETED | OUTPATIENT
Start: 2021-03-04 | End: 2021-03-04

## 2021-03-04 RX ADMIN — Medication 25 MG: at 10:09

## 2021-03-04 RX ADMIN — DEXTROSE MONOHYDRATE: 50 INJECTION, SOLUTION INTRAVENOUS at 11:08

## 2021-03-04 RX ADMIN — BUSPIRONE HYDROCHLORIDE 15 MG: 10 TABLET ORAL at 13:59

## 2021-03-04 RX ADMIN — CLONIDINE HYDROCHLORIDE 0.1 MG: 0.1 TABLET ORAL at 10:08

## 2021-03-04 RX ADMIN — LISINOPRIL 40 MG: 20 TABLET ORAL at 10:26

## 2021-03-04 RX ADMIN — POTASSIUM CHLORIDE 10 MEQ: 7.46 INJECTION, SOLUTION INTRAVENOUS at 04:12

## 2021-03-04 RX ADMIN — LEVETIRACETAM 750 MG: 500 TABLET ORAL at 21:13

## 2021-03-04 RX ADMIN — PANTOPRAZOLE SODIUM 40 MG: 40 TABLET, DELAYED RELEASE ORAL at 10:09

## 2021-03-04 RX ADMIN — ASPIRIN 81 MG: 81 TABLET, CHEWABLE ORAL at 10:06

## 2021-03-04 RX ADMIN — CYANOCOBALAMIN TAB 500 MCG 1000 MCG: 500 TAB at 10:07

## 2021-03-04 RX ADMIN — BUSPIRONE HYDROCHLORIDE 15 MG: 10 TABLET ORAL at 21:13

## 2021-03-04 RX ADMIN — POTASSIUM CHLORIDE 40 MEQ: 20 TABLET, EXTENDED RELEASE ORAL at 14:00

## 2021-03-04 RX ADMIN — FOLIC ACID 1 MG: 1 TABLET ORAL at 10:22

## 2021-03-04 RX ADMIN — SODIUM CHLORIDE: 9 INJECTION, SOLUTION INTRAVENOUS at 03:19

## 2021-03-04 RX ADMIN — FERROUS SULFATE TAB 325 MG (65 MG ELEMENTAL FE) 325 MG: 325 (65 FE) TAB at 10:08

## 2021-03-04 RX ADMIN — POTASSIUM CHLORIDE 10 MEQ: 7.46 INJECTION, SOLUTION INTRAVENOUS at 03:15

## 2021-03-04 RX ADMIN — Medication 15 G: at 08:39

## 2021-03-04 RX ADMIN — CEFTRIAXONE SODIUM 1000 MG: 1 INJECTION, POWDER, FOR SOLUTION INTRAMUSCULAR; INTRAVENOUS at 16:16

## 2021-03-04 RX ADMIN — POTASSIUM CHLORIDE 10 MEQ: 7.46 INJECTION, SOLUTION INTRAVENOUS at 02:00

## 2021-03-04 RX ADMIN — CLONIDINE HYDROCHLORIDE 0.1 MG: 0.1 TABLET ORAL at 21:13

## 2021-03-04 RX ADMIN — AMLODIPINE BESYLATE 5 MG: 5 TABLET ORAL at 10:08

## 2021-03-04 RX ADMIN — SODIUM CHLORIDE: 9 INJECTION, SOLUTION INTRAVENOUS at 17:10

## 2021-03-04 RX ADMIN — FERROUS SULFATE TAB 325 MG (65 MG ELEMENTAL FE) 325 MG: 325 (65 FE) TAB at 21:13

## 2021-03-04 RX ADMIN — BUSPIRONE HYDROCHLORIDE 15 MG: 10 TABLET ORAL at 10:07

## 2021-03-04 RX ADMIN — ATORVASTATIN CALCIUM 10 MG: 10 TABLET, FILM COATED ORAL at 10:08

## 2021-03-04 RX ADMIN — CETIRIZINE HYDROCHLORIDE 10 MG: 10 TABLET, FILM COATED ORAL at 10:08

## 2021-03-04 RX ADMIN — LEVETIRACETAM 750 MG: 500 TABLET ORAL at 10:10

## 2021-03-04 RX ADMIN — POTASSIUM CHLORIDE 10 MEQ: 7.46 INJECTION, SOLUTION INTRAVENOUS at 01:06

## 2021-03-04 RX ADMIN — MAGNESIUM SULFATE HEPTAHYDRATE 2000 MG: 40 INJECTION, SOLUTION INTRAVENOUS at 12:15

## 2021-03-04 NOTE — PLAN OF CARE
Nutrition Problem #1: Underweight  Intervention: Food and/or Nutrient Delivery: Modify Current Diet, Start Oral Nutrition Supplement(Change diet to Carb Control 4, Dysphagia Soft and Bite Sized.   Start Diabetic ONS TID, weigh pt)  Nutritional Goals: po intake > 75% of meals and supplements, wt gain> 100 lb, improved wound status

## 2021-03-04 NOTE — PROGRESS NOTES
Physician Progress Note    3/4/2021   12:32 PM    Name:  Mary Camacho  MRN:    [id number]     IP Day: 1     Admit Date: 3/3/2021 12:59 PM  PCP: Haylee Sierra MD    Code Status:  Full Code      Assessment and Plan: Active Problems/ diagnosis:     Catheter associated UTI present on admission  Severe hypokalemia  Severe hypomagnesemia  MRDD status  History of seizure disorder  HTN  Diabetes  Hyperlipidemia     3/4/2021  -resume IV Rocephin, follow urine and blood cultures  -Replete electrolytes, recheck BMP and magnesium this evening and replete as needed  -Resume home medication fluid in his antiepileptic medication.  -Diabetic diet  -DVT prophylaxis    Subjective:      no new events. Poor historian due to MRDD status. Physical Examination:      Vitals:  /61   Pulse 107   Temp 98.3 °F (36.8 °C)   Resp 21   Ht 5' 10\" (1.778 m)   Wt 100 lb (45.4 kg)   SpO2 98%   BMI 14.35 kg/m²   Temp (24hrs), Av °F (36.7 °C), Min:97.7 °F (36.5 °C), Max:98.3 °F (36.8 °C)      CONSTITUTIONAL:  awake, alert, cooperative, no apparent distress, and appears stated age  HEENT: Normocephalic, PERRLA  NECK: no JVD, no LAD  HEART: RRR, no murmurs, gallops, or rubs  LUNGS: clear to auscultation bilaterally, no wheezes, crackles, or rhonchi.   ABDOMEN: soft/NT/ND, positive BS  MUSCULOSKELETAL: negative for edema, +2 pulses  SKIN: intact without rash or jaundice  NEURO: Nonverbal, nonfocal exam.  Ha catheter in place, has a leg bag    Data:     Labs:  Recent Labs     21  1336 21  0703   WBC 11.1* 9.9   HGB 12.0* 10.8*    264     Recent Labs     21  1944 21  0703   * 148*   K 2.5* 3.1*    106   CO2 34* 29   BUN 17 21   CREATININE 0.40* 0.40*   GLUCOSE 426* 36*     Recent Labs     21  1336   AST 14   ALT 12   BILITOT 0.3   ALKPHOS 197*       Current Facility-Administered Medications   Medication Dose Route Frequency Provider Last Rate Last Admin    magnesium sulfate 2000 mg in 50 mL IVPB premix  2,000 mg Intravenous Once [facility], DO 25 mL/hr at 03/04/21 1215 2,000 mg at 03/04/21 1215    dextrose 5 % solution   Intravenous Continuous [facility], DO 50 mL/hr at 03/04/21 1108 New Bag at 03/04/21 1108    amLODIPine (NORVASC) tablet 5 mg  5 mg Oral Daily [facility], DO   5 mg at 03/04/21 1008    aspirin chewable tablet 81 mg  81 mg Oral Daily [facility], DO   81 mg at 03/04/21 1006    atorvastatin (LIPITOR) tablet 10 mg  10 mg Oral Daily [facility], DO   10 mg at 03/04/21 1008    busPIRone (BUSPAR) tablet 15 mg  15 mg Oral TID [facility], DO   15 mg at 03/04/21 1007    cloNIDine (CATAPRES) tablet 0.1 mg  0.1 mg Oral TID [facility], DO   0.1 mg at 45/21/86 7493    folic acid (FOLVITE) tablet 1 mg  1 mg Oral Daily [facility], DO   1 mg at 03/04/21 1022    ferrous sulfate (IRON 325) tablet 325 mg  325 mg Oral BID [facility], DO   325 mg at 03/04/21 1008    levETIRAcetam (KEPPRA) tablet 750 mg  750 mg Oral BID [facility], DO   750 mg at 03/04/21 1010    pantoprazole (PROTONIX) tablet 40 mg  40 mg Oral Daily [facility], DO   40 mg at 03/04/21 1009    glucose (GLUTOSE) 40 % oral gel 15 g  15 g Oral PRN [facility], DO   15 g at 03/04/21 0839    dextrose 50 % IV solution  12.5 g Intravenous PRN [facility], DO        glucagon (rDNA) injection 1 mg  1 mg Intramuscular PRN [facility], DO        dextrose 5 % solution  100 mL/hr Intravenous PRN [facility], DO        sodium chloride flush 0.9 % injection 10 mL  10 mL Intravenous 2 times per day [facility], DO   10 mL at 03/03/21 4617    sodium chloride flush 0.9 % injection 10 mL  10 mL Intravenous PRN [facility], DO        enoxaparin (LOVENOX) injection 40 mg  40 mg Subcutaneous Daily [facility], DO   40 mg at 03/03/21 9154    promethazine (PHENERGAN) tablet 12.5 mg  12.5 mg Oral Q6H PRN [facility], DO        Or    ondansetron [facility]) injection 4 mg  4 mg Intravenous Q6H PRN Pitney Grant, DO        polyethylene glycol (GLYCOLAX) packet 17 g  17 g Oral Daily PRN Pitney Grant, DO        acetaminophen (TYLENOL) tablet 650 mg  650 mg Oral Q6H PRN Pitney Grant, DO        Or    acetaminophen (TYLENOL) suppository 650 mg  650 mg Rectal Q6H PRN Pitney Grant, DO        potassium chloride (KLOR-CON M) extended release tablet 40 mEq  40 mEq Oral PRN Pitney Grant, DO        Or    potassium bicarb-citric acid (EFFER-K) effervescent tablet 40 mEq  40 mEq Oral PRN Pitney Grant, DO        Or    potassium chloride 10 mEq/100 mL IVPB (Peripheral Line)  10 mEq Intravenous PRN Pitney Grant,  mL/hr at 03/04/21 0412 10 mEq at 03/04/21 0412    magnesium sulfate 2000 mg in 50 mL IVPB premix  2,000 mg Intravenous PRN Pitney Grant, DO        insulin lispro (HUMALOG) injection vial 0-12 Units  0-12 Units Subcutaneous TID WC Pitney Grant, DO   12 Units at 03/03/21 1955    insulin lispro (HUMALOG) injection vial 0-6 Units  0-6 Units Subcutaneous Nightly Garry Mosqueda, DO        0.9 % sodium chloride infusion   Intravenous Continuous Pitney Grant, DO   Stopped at 03/04/21 1108    cefTRIAXone (ROCEPHIN) 1000 mg IVPB in 50 mL D5W minibag  1,000 mg Intravenous Q24H Garry R Panda, DO        lisinopril (PRINIVIL;ZESTRIL) tablet 40 mg  40 mg Oral Daily Pitney Grant, DO   40 mg at 03/04/21 1026    loperamide (IMODIUM) capsule 2 mg  2 mg Oral 4x Daily PRN Pitney Grant, DO   2 mg at 03/03/21 2251    cetirizine (ZYRTEC) tablet 10 mg  10 mg Oral Daily Pitney Grant, DO   10 mg at 03/04/21 1008    vitamin B-6 (PYRIDOXINE) tablet 25 mg  25 mg Oral Daily Pitney Grant, DO   25 mg at 03/04/21 1009    vitamin B-12 (CYANOCOBALAMIN) tablet 1,000 mcg  1,000 mcg Oral Daily Pitney Grant, DO   1,000 mcg at 03/04/21 1007    potassium chloride (KLOR-CON M) extended release tablet 40 mEq  40 mEq Oral PRN Shiloh Gaffney MD

## 2021-03-04 NOTE — PROGRESS NOTES
Wound Ostomy Continence Nurse  Consult Note       NAME:  Pierre Taveras  MEDICAL RECORD NUMBER:  [id number]  AGE: 78 y.o. GENDER: male  : 1950  TODAY'S DATE:  3/4/2021    Subjective   Reason for [id number] Nurse Evaluation and Assessment: Coccyx & left posterior upper thigh      Pierre Taveras is a 78 y.o. male referred by:   [x] Physician  [] Nursing  [] Other:     Wound Identification:  Wound Type: pressure  Contributing Factors: diabetes, chronic pressure, decreased mobility, shear force, malnutrition, incontinence of stool and cachexia    Wound History: Patient admitted to [facility] with an unstageable pressure injury to the coccyx & left posterior upper thigh.  Patient is seen in [address] [place] [address] Treatment:  Recommending 1) Pressure injury prevention interventions 2) Protective barrier cream with zinc for incontinence BID and PRN 3) Plurogel dressing change to coccyx and left upper posterior thigh wound daily, to promote autolytic debridement 4) [facility] / bedside conservative sharp debridement     Patient Goal of Care:  [x] Wound Healing  [] Odor Control  [] Palliative Care  [] Pain Control   [] Other:         PAST MEDICAL HISTORY        Diagnosis Date    Anemia     Chronic indwelling Ha catheter     Chronic UTI     Chronic UTI     Dermatitis     DM (diabetes mellitus) ([address]     DM type 2 (diabetes mellitus, type 2) ([address] 10/29/2013    Eczema     Gastric polyp     GERD (gastroesophageal reflux disease)     H/O bone density study 9.2.11    lumbar spine, lt femoral neck osteopenia, rt femoral neck normal    High blood pressure     HTN (hypertension)     Hypokalemia     Hypovitaminosis D     VANIA (iron deficiency anemia)     MR (mental retardation)     Neurogenic dysfunction of the urinary bladder 2019    Osteopenia     Pressure ulcer of coccygeal region, unstageable ([address] 2020    Seizure disorder ([address]     Wound of back        PAST SURGICAL HISTORY    Past Surgical History:   Procedure Laterality Date    COLONOSCOPY  8.19.11    negative    UPPER GASTROINTESTINAL ENDOSCOPY  8.19.11    by Dr. Ness Camera N/A 11/7/2019    EGD performed by Hebert Silva MD at [address] [place] [address]    History reviewed. No pertinent family history. SOCIAL HISTORY    Social History     Tobacco Use    Smoking status: Never Smoker    Smokeless tobacco: Never Used   Substance Use Topics    Alcohol use: No    Drug use: No       ALLERGIES    No Known Allergies    MEDICATIONS    No current facility-administered medications on file prior to encounter.       Current Outpatient Medications on File Prior to Encounter   Medication Sig Dispense Refill    sertraline (ZOLOFT) 100 MG tablet TAKE ONE (1) TABLET BY MOUTH ONCE (1) DAILY IN THE MORNING (ANTI-DEPRESSANT) 90 tablet 0    loratadine (CLARITIN) 10 MG tablet TAKE ONE (1) TABLET BY MOUTH ONCE (1) DAILY (PRURITIC DERMATITUS) 90 tablet 0    vitamin B-12 (CYANOCOBALAMIN) 1000 MCG tablet Take 1,000 mcg by mouth daily      JANUVIA 100 MG tablet TAKE ONE (1) TABLET BY MOUTH ONCE (1) DAILY 30 tablet 0    aspirin 81 MG chewable tablet Take 81 mg by mouth daily      folic acid (FOLVITE) 1 MG tablet Take 1 mg by mouth daily      amLODIPine (NORVASC) 5 MG tablet Take 5 mg by mouth daily      atorvastatin (LIPITOR) 10 MG tablet Take 10 mg by mouth daily      busPIRone (BUSPAR) 15 MG tablet Take 15 mg by mouth 3 times daily      cloNIDine (CATAPRES) 0.1 MG tablet Take 0.1 mg by mouth 3 times daily      Cranberry 500 MG CAPS Take 500 mg by mouth 2 times daily      ferrous sulfate 325 (65 Fe) MG tablet Take 325 mg by mouth 2 times daily      levETIRAcetam (KEPPRA) 750 MG tablet Take 750 mg by mouth 2 times daily      lisinopril (PRINIVIL;ZESTRIL) 40 MG tablet Take 40 mg by mouth 2 times daily      metFORMIN (GLUCOPHAGE) 500 MG tablet Take 750 mg by mouth 2 times daily (with meals)      Multiple Vitamin (MULTIVITAMINS PO) Take by mouth daily      calcium carbonate (OSCAL) 500 MG TABS tablet Take 500 mg by mouth 2 times daily      pantoprazole (PROTONIX) 40 MG tablet Take 40 mg by mouth daily      pioglitazone (ACTOS) 30 MG tablet Take 45 mg by mouth daily       Pyridoxine HCl (VITAMIN B-6) 50 MG tablet Take 25 mg by mouth daily      Cholecalciferol (VITAMIN D3) 2000 units CAPS Take by mouth Daily with lunch      loperamide (IMODIUM) 2 MG capsule Take 2 mg by mouth 4 times daily as needed for Diarrhea      Na Sulfate-K Sulfate-Mg Sulf 17.5-3.13-1.6 GM/177ML SOLN As directed 1 Bottle 0    Lancet Device MISC 1 Device by Does not apply route once for 1 dose 1 Device 0    ammonium lactate (LAC-HYDRIN) 12 % lotion Apply topically as needed for Dry Skin Apply topically as needed.  Mis. Devices [facility]) MISC Manual wheel chair with foot petals  A50.49 1 each 0    Elastic Bandages & Supports (T.E.D. KNEE LENGTH/M-REGULAR) MISC 1 set of MINERVA stockings. Apply daily while upright in wheelchair as needed for dependent edema control.   Pharmacy/ DME may adjust size as appropriate with corrected measurement 1 each 0    acetaminophen (TYLENOL) 325 MG tablet Take 325 mg by mouth daily      ondansetron (ZOFRAN) 4 MG tablet Take 4 mg by mouth every 6 hours as needed for Nausea or Vomiting      promethazine (PHENERGAN) 25 MG suppository Place 25 mg rectally every 6 hours as needed for Nausea         Objective    /61   Pulse 107   Temp 98.3 °F (36.8 °C)   Resp 21   Ht 5' 10\" (1.778 m)   Wt 100 lb (45.4 kg)   SpO2 98%   BMI 14.35 kg/m²     LABS:  WBC:    Lab Results   Component Value Date    WBC 9.9 03/04/2021     H/H:    Lab Results   Component Value Date    HGB 10.8 03/04/2021    HCT 33.2 03/04/2021     PTT:    Lab Results   Component Value Date    APTT 29.9 06/30/2019   [APTT}  PT/INR:    Lab Results   Component Value Date    PROTIME 10.7 06/30/2019 Description Serosanguinous 03/04/21 1210   Odor None 03/04/21 1210   Amilcar-wound Assessment Fragile;Denuded 03/04/21 1210   Margins Defined edges 03/04/21 1210   Wound Thickness Description not for Pressure Injury Full thickness 03/04/21 1210   Number of days: 168       Wound 02/25/21 #2 left posterior upper thigh (Active)   Wound Image   03/04/21 1210   Wound Etiology Pressure Unstageable 03/04/21 1210   Dressing Status Reinforced dressing;Clean;Dry; Intact 03/04/21 0854   Wound Cleansed Cleansed with saline 02/25/21 1021   Offloading for Diabetic Foot Ulcers Yes (type) 03/04/21 1210   Wound Length (cm) 3 cm 03/04/21 1210   Wound Width (cm) 3.5 cm 03/04/21 1210   Wound Depth (cm) 0.1 cm 02/25/21 1021   Wound Surface Area (cm^2) 10.5 cm^2 03/04/21 1210   Change in Wound Size % (l*w) -100 03/04/21 1210   Wound Volume (cm^3) 0.52 cm^3 02/25/21 1021   Wound Assessment Eschar dry;Eschar moist 03/04/21 1210   Drainage Amount Scant 03/04/21 1210   Drainage Description Yellow 03/04/21 1210   Odor None 03/04/21 1210   Amilcar-wound Assessment Fragile; Intact 03/04/21 1210   Margins Defined edges 03/04/21 1210   Wound Thickness Description not for Pressure Injury Full thickness 03/04/21 1210   Number of days: 7      Assessment:    Patient follow by Wound NP in [facility], patient's photo's reviewed from his last visit on 2/25/2021 - wounds appear worse now. Left posterior thigh wound with  Larger measurements and more non-viable tissue in wound bed. Coxxyx wound with dark, soft non-viable tissue in wound bed and denuded amilcar wound area. Upon entering the room, patient had a medium loose stool, which could be contributing to the worsening of wounds. Also important to note, area of intact scar tissue to the mid/upper back and to the right ischium; patient appears to have old healed wounds to these areas.       Plan   Plan of Care: Wound 02/25/21 #2 left posterior upper thigh-Dressing/Treatment: (plurogel & cover dressing)  Wound 09/17/20 Coccyx #1-Dressing/Treatment: (plurogel & cover dressing)    Specialty Bed Required : Yes   [x] Low Air Loss   [] Pressure Redistribution  [] Fluid Immersion  [] Bariatric  [] Other:     Current Diet: DIET GENERAL; Dysphagia Soft and Bite-Sized  Dietician consult:  yes    Discharge Plan:  Placement for patient upon discharge: group home    Patient appropriate for Outpatient [address]: Yes    Referrals:  []   [] [facility]  [] Supplies  [] Other    Patient/Caregiver Teaching:  Level of patient/caregiver understanding able to:   [] Indicates understanding       [] Needs reinforcement  [] Unsuccessful      [] Verbal Understanding  [] Demonstrated understanding       [] No evidence of learning  [] Refused teaching         [x] N/A       Electronically signed by Matty Anna, TEETEEN, RN, CWOCN on 3/4/2021 at 1:29 PM

## 2021-03-04 NOTE — PROGRESS NOTES
Pt. Updates given to Director f Nursing home Hannah Aragon. Legal guardians [employer] updated on pt. Condition and admission diagnosis. Pt. Remains on Ra with no distress noted.

## 2021-03-04 NOTE — CARE COORDINATION
Pt is from [facility] where he is provided with total care. Plan return at [facility] attempted phone call to group [place] nurse and guardian.

## 2021-03-04 NOTE — PROGRESS NOTES
Comprehensive Nutrition Assessment    Type and Reason for Visit:  Initial, Consult, Wound(wound/low BMI)    Nutrition Recommendations/Plan:   Change diet to Carb Control 4, Dysphagia Soft and Bite Sized. Start Diabetic ONS TID  Weigh pt    Nutrition Assessment:  Pt admitted with UTI. Unable to determine nutrition status pta, pt off unit for procedure. Low BMI noted, will need actual weight to assess. Will change diet to Carb Control and start diabetic ONS and continue to monitor    Malnutrition Assessment:  Malnutrition Status:  Insufficient data    Context:  Chronic Illness     Findings of the 6 clinical characteristics of malnutrition:  Energy Intake:  Unable to assess  Weight Loss:  Unable to assess     Body Fat Loss:  Unable to assess     Muscle Mass Loss:  Unable to assess    Fluid Accumulation:  Unable to assess     Strength:  Not Performed    Estimated Daily Nutrient Needs:  Energy (kcal):  6947-6932 (kg x 30-33); Weight Used for Energy Requirements:  Admission(45.4 kg)     Protein (g):  68-81 gm (kg x 1.5-1.8); Weight Used for Protein Requirements:  Admission(45.4 kg)        Fluid (ml/day):  ~1500 ml; Method Used for Fluid Requirements:  1 ml/kcal      Nutrition Related Findings:  PMH: MRDD, DM, HTN, HLD. Nonverbal, off unit-unable to perform NFPE. labs/meds reviewed-gluc: 36, (> 400 on adm), Mg 1.5, K+ 3.1      Wounds:  Pressure Injury, Unstageable(coccyx)       Current Nutrition Therapies:    DIET CARB CONTROL; Carb Control: 4 carb choices (60 gms)/meal; Dysphagia Soft and Bite-Sized (26-50%)  Dietary Nutrition Supplements: Diabetic Oral Supplement (just ordered)    Anthropometric Measures:  · Height: 5' 10\" (177.8 cm)  · Current Body Weight: 100 lb (45.4 kg)   · Admission Body Weight: 100 lb (45.4 kg)(estimated)    · Usual Body Weight: (UTD.   No actual weights in EMR)     · Ideal Body Weight: 166 lbs; % Ideal Body Weight  60%   · BMI: 14.3  · BMI Categories: Underweight (BMI less than 22) age over 72       Nutrition Diagnosis:   · Underweight related to inadequate protein-energy intake as evidenced by BMI, intake 26-50%    Nutrition Interventions:   Food and/or Nutrient Delivery:  Modify Current Diet, Start Oral Nutrition Supplement(Change diet to Carb Control 4, Dysphagia Soft and Bite Sized.   Start Diabetic ONS TID, weigh pt)  Nutrition Education/Counseling:  Education not indicated   Coordination of Nutrition Care:  Continue to monitor while inpatient    Goals:  po intake > 75% of meals and supplements, wt gain> 100 lb, improved wound status       Nutrition Monitoring and Evaluation:   Food/Nutrient Intake Outcomes:  Food and Nutrient Intake, Supplement Intake  Physical Signs/Symptoms Outcomes:  Biochemical Data, Chewing or Swallowing, Weight, Skin     Electronically signed by Alannah Casrto RD, LD on 3/4/21 at 3:40 PM EST

## 2021-03-04 NOTE — PROGRESS NOTES
Pt arrived to unit, nonverbal. Reviewed meds from patients chart from group home. Per group home he took am meds. Notified Dr. Ginger Khan that blood glucose is 411. Ordered to give coverage as ordered. Vs stable.

## 2021-03-05 LAB
ANION GAP SERPL CALCULATED.3IONS-SCNC: 8 MEQ/L (ref 9–15)
BASOPHILS ABSOLUTE: 0 K/UL (ref 0–0.2)
BASOPHILS RELATIVE PERCENT: 0.3 %
BUN BLDV-MCNC: 18 MG/DL (ref 8–23)
CALCIUM SERPL-MCNC: 8.3 MG/DL (ref 8.5–9.9)
CHLORIDE BLD-SCNC: 113 MEQ/L (ref 95–107)
CO2: 29 MEQ/L (ref 20–31)
CREAT SERPL-MCNC: 0.3 MG/DL (ref 0.7–1.2)
EOSINOPHILS ABSOLUTE: 0 K/UL (ref 0–0.7)
EOSINOPHILS RELATIVE PERCENT: 0.5 %
GFR AFRICAN AMERICAN: >60
GFR NON-AFRICAN AMERICAN: >60
GLUCOSE BLD-MCNC: 157 MG/DL (ref 70–99)
GLUCOSE BLD-MCNC: 179 MG/DL (ref 60–115)
GLUCOSE BLD-MCNC: 225 MG/DL (ref 60–115)
GLUCOSE BLD-MCNC: 274 MG/DL (ref 60–115)
GLUCOSE BLD-MCNC: 340 MG/DL (ref 60–115)
HCT VFR BLD CALC: 31.9 % (ref 42–52)
HEMOGLOBIN: 10.4 G/DL (ref 14–18)
LYMPHOCYTES ABSOLUTE: 1.4 K/UL (ref 1–4.8)
LYMPHOCYTES RELATIVE PERCENT: 14.3 %
MCH RBC QN AUTO: 29.6 PG (ref 27–31.3)
MCHC RBC AUTO-ENTMCNC: 32.7 % (ref 33–37)
MCV RBC AUTO: 90.4 FL (ref 80–100)
MONOCYTES ABSOLUTE: 0.3 K/UL (ref 0.2–0.8)
MONOCYTES RELATIVE PERCENT: 3.4 %
NEUTROPHILS ABSOLUTE: 7.9 K/UL (ref 1.4–6.5)
NEUTROPHILS RELATIVE PERCENT: 81.5 %
PDW BLD-RTO: 14.6 % (ref 11.5–14.5)
PERFORMED ON: ABNORMAL
PLATELET # BLD: 186 K/UL (ref 130–400)
POTASSIUM REFLEX MAGNESIUM: 3.7 MEQ/L (ref 3.4–4.9)
RBC # BLD: 3.53 M/UL (ref 4.7–6.1)
SODIUM BLD-SCNC: 150 MEQ/L (ref 135–144)
WBC # BLD: 9.6 K/UL (ref 4.8–10.8)

## 2021-03-05 PROCEDURE — 2580000003 HC RX 258: Performed by: INTERNAL MEDICINE

## 2021-03-05 PROCEDURE — 99232 SBSQ HOSP IP/OBS MODERATE 35: CPT | Performed by: INTERNAL MEDICINE

## 2021-03-05 PROCEDURE — 80048 BASIC METABOLIC PNL TOTAL CA: CPT

## 2021-03-05 PROCEDURE — 1210000000 HC MED SURG R&B

## 2021-03-05 PROCEDURE — 6360000002 HC RX W HCPCS: Performed by: INTERNAL MEDICINE

## 2021-03-05 PROCEDURE — 6370000000 HC RX 637 (ALT 250 FOR IP): Performed by: INTERNAL MEDICINE

## 2021-03-05 PROCEDURE — 36415 COLL VENOUS BLD VENIPUNCTURE: CPT

## 2021-03-05 PROCEDURE — 85025 COMPLETE CBC W/AUTO DIFF WBC: CPT

## 2021-03-05 PROCEDURE — 99202 OFFICE O/P NEW SF 15 MIN: CPT | Performed by: NURSE PRACTITIONER

## 2021-03-05 PROCEDURE — 0JB70ZZ EXCISION OF BACK SUBCUTANEOUS TISSUE AND FASCIA, OPEN APPROACH: ICD-10-PCS | Performed by: INTERNAL MEDICINE

## 2021-03-05 RX ORDER — SODIUM CHLORIDE 450 MG/100ML
INJECTION, SOLUTION INTRAVENOUS CONTINUOUS
Status: DISCONTINUED | OUTPATIENT
Start: 2021-03-05 | End: 2021-03-06 | Stop reason: HOSPADM

## 2021-03-05 RX ADMIN — LISINOPRIL 40 MG: 20 TABLET ORAL at 07:59

## 2021-03-05 RX ADMIN — CYANOCOBALAMIN TAB 500 MCG 1000 MCG: 500 TAB at 07:59

## 2021-03-05 RX ADMIN — ATORVASTATIN CALCIUM 10 MG: 10 TABLET, FILM COATED ORAL at 08:12

## 2021-03-05 RX ADMIN — BUSPIRONE HYDROCHLORIDE 15 MG: 10 TABLET ORAL at 08:12

## 2021-03-05 RX ADMIN — FERROUS SULFATE TAB 325 MG (65 MG ELEMENTAL FE) 325 MG: 325 (65 FE) TAB at 20:27

## 2021-03-05 RX ADMIN — LEVETIRACETAM 750 MG: 500 TABLET ORAL at 20:26

## 2021-03-05 RX ADMIN — SODIUM CHLORIDE, PRESERVATIVE FREE 10 ML: 5 INJECTION INTRAVENOUS at 20:29

## 2021-03-05 RX ADMIN — CETIRIZINE HYDROCHLORIDE 10 MG: 10 TABLET, FILM COATED ORAL at 07:59

## 2021-03-05 RX ADMIN — CLONIDINE HYDROCHLORIDE 0.1 MG: 0.1 TABLET ORAL at 13:15

## 2021-03-05 RX ADMIN — ENOXAPARIN SODIUM 40 MG: 40 INJECTION SUBCUTANEOUS at 07:59

## 2021-03-05 RX ADMIN — FOLIC ACID 1 MG: 1 TABLET ORAL at 08:12

## 2021-03-05 RX ADMIN — CLONIDINE HYDROCHLORIDE 0.1 MG: 0.1 TABLET ORAL at 20:26

## 2021-03-05 RX ADMIN — FERROUS SULFATE TAB 325 MG (65 MG ELEMENTAL FE) 325 MG: 325 (65 FE) TAB at 07:58

## 2021-03-05 RX ADMIN — BUSPIRONE HYDROCHLORIDE 15 MG: 10 TABLET ORAL at 13:15

## 2021-03-05 RX ADMIN — CLONIDINE HYDROCHLORIDE 0.1 MG: 0.1 TABLET ORAL at 07:57

## 2021-03-05 RX ADMIN — SODIUM CHLORIDE: 4.5 INJECTION, SOLUTION INTRAVENOUS at 20:28

## 2021-03-05 RX ADMIN — AMLODIPINE BESYLATE 5 MG: 5 TABLET ORAL at 07:59

## 2021-03-05 RX ADMIN — BUSPIRONE HYDROCHLORIDE 15 MG: 10 TABLET ORAL at 20:27

## 2021-03-05 RX ADMIN — SODIUM CHLORIDE: 9 INJECTION, SOLUTION INTRAVENOUS at 02:49

## 2021-03-05 RX ADMIN — LEVETIRACETAM 750 MG: 500 TABLET ORAL at 07:59

## 2021-03-05 RX ADMIN — CEFTRIAXONE SODIUM 1000 MG: 1 INJECTION, POWDER, FOR SOLUTION INTRAMUSCULAR; INTRAVENOUS at 14:32

## 2021-03-05 RX ADMIN — ASPIRIN 81 MG: 81 TABLET, CHEWABLE ORAL at 07:57

## 2021-03-05 RX ADMIN — PANTOPRAZOLE SODIUM 40 MG: 40 TABLET, DELAYED RELEASE ORAL at 07:58

## 2021-03-05 RX ADMIN — SODIUM CHLORIDE: 4.5 INJECTION, SOLUTION INTRAVENOUS at 11:49

## 2021-03-05 RX ADMIN — Medication 25 MG: at 07:59

## 2021-03-05 NOTE — PLAN OF CARE
Problem: Falls - Risk of:  Goal: Will remain free from falls  Description: Will remain free from falls  Outcome: Ongoing  Goal: Absence of physical injury  Description: Absence of physical injury  Outcome: Ongoing     Problem: Skin Integrity:  Goal: Will show no infection signs and symptoms  Description: Will show no infection signs and symptoms  Outcome: Ongoing  Goal: Absence of new skin breakdown  Description: Absence of new skin breakdown  Outcome: Ongoing     Problem: Nutrition  Goal: Optimal nutrition therapy  Outcome: Ongoing     Electronically signed by Moy Rogers RN on 3/5/2021 at 12:57 AM

## 2021-03-05 NOTE — PROGRESS NOTES
Physician Progress Note    3/5/2021   1:28 PM    Name:  Rick Layton  MRN:    [id number]     IP Day: 2     Admit Date: 3/3/2021 12:59 PM  PCP: Coleen Bess MD    Code Status:  Full Code      Assessment and Plan: Active Problems/ diagnosis:     Catheter associated UTI present on admission  Severe hypokalemia  Severe hypomagnesemia  MRDD status  History of seizure disorder  HTN  Diabetes  Hyperlipidemia     3/5/2021  -resume IV Rocephin, blood cultures negative, urine culture is growing Providencia stuartii  -Replete electrolytes, recheck BMP and magnesium this evening and replete as needed  -Change IV fluid to half-normal saline given hypernatremia  -Resume home medication fluid in his antiepileptic medication.  -Diabetic diet  -DVT prophylaxis  -Anticipate discharge to group home tomorrow    Subjective:      no new events. Poor historian due to MRDD status. Physical Examination:      Vitals:  BP (!) 139/90   Pulse 95   Temp 98.3 °F (36.8 °C) (Oral)   Resp 16   Ht 5' 10\" (1.778 m)   Wt 112 lb 14 oz (51.2 kg)   SpO2 (!) 81%   BMI 16.20 kg/m²   Temp (24hrs), Av.3 °F (36.8 °C), Min:98.3 °F (36.8 °C), Max:98.3 °F (36.8 °C)      CONSTITUTIONAL:  awake, alert, cooperative, no apparent distress, and appears stated age  HEENT: Normocephalic, PERRLA  NECK: no JVD, no LAD  HEART: RRR, no murmurs, gallops, or rubs  LUNGS: clear to auscultation bilaterally, no wheezes, crackles, or rhonchi.   ABDOMEN: soft/NT/ND, positive BS  MUSCULOSKELETAL: negative for edema, +2 pulses  SKIN: intact without rash or jaundice  NEURO: Nonverbal, nonfocal exam.  Ha catheter in place, has a leg bag    Data:     Labs:  Recent Labs     21  0703 21  0638   WBC 9.9 9.6   HGB 10.8* 10.4*    186     Recent Labs     21  0703 21  0638   * 150*   K 3.1* 3.7    113*   CO2 29 29   BUN 21 18   CREATININE 0.40* 0.30*   GLUCOSE 36* 157*     Recent Labs     21  1336   AST 14   ALT 12 10 mL  10 mL Intravenous PRN Atlee Ferro, DO        enoxaparin (LOVENOX) injection 40 mg  40 mg Subcutaneous Daily Atlee Ferro, DO   40 mg at 03/05/21 0759    promethazine (PHENERGAN) tablet 12.5 mg  12.5 mg Oral Q6H PRN Atlee Ferro, DO        Or    ondansetron [facility]) injection 4 mg  4 mg Intravenous Q6H PRN Twyla Mosqueda, DO        polyethylene glycol (GLYCOLAX) packet 17 g  17 g Oral Daily PRN Atlee Ferro, DO        acetaminophen (TYLENOL) tablet 650 mg  650 mg Oral Q6H PRN Atlee Ferro, DO        Or    acetaminophen (TYLENOL) suppository 650 mg  650 mg Rectal Q6H PRN Atlee Ferro, DO        potassium chloride (KLOR-CON M) extended release tablet 40 mEq  40 mEq Oral PRN Atlee Ferro, DO        Or    potassium bicarb-citric acid (EFFER-K) effervescent tablet 40 mEq  40 mEq Oral PRN Atlee Ferro, DO        Or    potassium chloride 10 mEq/100 mL IVPB (Peripheral Line)  10 mEq Intravenous PRN Atlee Ferro,  mL/hr at 03/04/21 0412 10 mEq at 03/04/21 0412    magnesium sulfate 2000 mg in 50 mL IVPB premix  2,000 mg Intravenous PRN Atlee Ferro, DO        cefTRIAXone (ROCEPHIN) 1000 mg IVPB in 50 mL D5W minibag  1,000 mg Intravenous Q24H Atlee Ferro, DO   Stopped at 03/04/21 1646    lisinopril (PRINIVIL;ZESTRIL) tablet 40 mg  40 mg Oral Daily Atlee Ferro, DO   40 mg at 03/05/21 0759    loperamide (IMODIUM) capsule 2 mg  2 mg Oral 4x Daily PRN Atlee Ferro, DO   2 mg at 03/03/21 9201    cetirizine (ZYRTEC) tablet 10 mg  10 mg Oral Daily Atlee Ferro, DO   10 mg at 03/05/21 0759    vitamin B-6 (PYRIDOXINE) tablet 25 mg  25 mg Oral Daily Atlee Ferro, DO   25 mg at 03/05/21 0759    vitamin B-12 (CYANOCOBALAMIN) tablet 1,000 mcg  1,000 mcg Oral Daily Atlee Ferro, DO   1,000 mcg at 03/05/21 0759    potassium chloride (KLOR-CON M) extended release tablet 40 mEq  40 mEq Oral PRN Shiloh Gaffney MD   40 mEq at 03/04/21 1400    Or    potassium bicarb-citric acid (EFFER-K) effervescent tablet 40 mEq  40 mEq Oral PRN Marian Osorio MD        Or    potassium chloride 10 mEq/100 mL IVPB (Peripheral Line)  10 mEq Intravenous PRN Marian Osorio MD           Additional work up or/and treatment plan may be added today or then after based on clinical progression. I am managing a portion of pt care. Some medical issues are handled by other specialists. Additional work up and treatment should be done in out pt setting by pt PCP and other out pt providers. In addition to examining and evaluating pt, I spent additional time explaining care, normaland abnormal findings, and treatment plan. All of pt questions were answered. Counseling, diet and education were provided. Case will be discussed with nursing staff when appropriate. Family will be updated if and when appropriate.        Electronically signed by Luis Olson DO on 3/5/2021 at 1:28 PM

## 2021-03-05 NOTE — CARE COORDINATION
Phone call to nurse Abdirashid Barraza with [facility] group [facility] to notify of probable DC tomorrow. She requested transport by ambulance to [address] [place] [address] with a phone call to the group home [phone].

## 2021-03-05 NOTE — CONSULTS
[address]                      [facility] [address] [place], [address] [facility]                                  CONSULTATION    PATIENT NAME: Deya Santos                     :        1950  MED REC NO:   [id number]                            ROOM:       [id number]  ACCOUNT NO:   [de-identified]                           ADMIT DATE: 2021  PROVIDER:     Gonzales Farah MD    CONSULT DATE:  2021    ENDOCRINE CONSULT    REFERRING PROVIDER:  Mitch Argueta DO    REASON FOR CONSULT:  Uncontrolled diabetes, labile blood sugars. CHIEF COMPLAINT AND HISTORY OF PRESENT ILLNESS:  The patient is a  29-year-old male with known history of type 2 diabetes admitted through  the emergency room because of hyperglycemia. He has had history of  MRDD, type 2 diabetes, chronic Ha catheter, seizure disorder  presenting with high blood sugar. Blood sugar was 450. Noted to have  severe hypokalemia and hypomagnesemia. He had potassium of 2.5,  magnesium of 1.2. The patient's initial glucose was in the 400+ range. Baseline chemistries:  Sodium 149, potassium 2.5, chloride 103, CO2 was  34, BUN 17, creatinine 0.4, glucose 426. Blood sugar dropped eventually  later to 36 due to poor p.o. intake, has been starting to improve. The patient was given 10 units of Humalog insulin yesterday for  hyperglycemia. His A1c done today was 8.8. Prior A1c couple of years  ago was 6.7. At home, the patient takes oral medications for diabetes, metformin 750  mg twice daily, Actos 30 mg daily, Januvia 100 mg daily. Initial admitting diagnosis was uncontrolled diabetes, catheter-induced  UTI, hypokalemia, hypomagnesemia, dehydration. The patient was started  on IV Rocephin. PAST MEDICAL HISTORY:  Significant for type 2 diabetes, mental  retardation, seizure disorder, chronic UTI, osteopenia, eczema,  dermatitis. PAST SURGICAL HISTORY:  Upper GI endoscopy, colonoscopy.     FAMILY HISTORY:  Reviewed, noncontributory. PERSONAL AND SOCIAL HISTORY:  Denies any smoking. MEDICATIONS:  Here included Rocephin IV, Humalog coverage medium dose,  Norvasc, Lipitor, BuSpar, Zyrtec, Rocephin, Catapres, iron, Folvite,  Keppra, lisinopril, Protonix, and vitamin B6. ALLERGIES:  None. REVIEW OF SYSTEMS:  Other than hyperglycemia, a 14-point review of  systems unable to obtain. PHYSICAL EXAMINATION:  GENERAL:  The patient is alert, awake, unable to answer questions. Appears slightly malnourished. BMI of 14. VITAL SIGNS:  Blood pressure was 138/86, pulse rate was 102, respiratory  rate 21, temperature 98. 3. HEENT:  Normocephalic, atraumatic. Oral mucosa was slightly dry. NECK:  Supple. Trachea in midline. CHEST:  Lungs clear to auscultation bilaterally. No wheezing or  crackles were heard. CARDIOVASCULAR:  Heart sounds were normal.  No murmurs or thrills were  present. ABDOMEN:  Soft, nonobese, nontender. Bowel sounds are present. EXTREMITIES:  Lower extremities reveal 2+ pitting edema of the lower  feet. SKIN:  Intact. MUSCULOSKELETAL:  No joint swelling. NEUROLOGIC:  Unable to complete. PSYCHIATRIC:  Unable to complete. ASSESSMENT:  Uncontrolled type 2 diabetes worsened with UTI, improving  with antibiotics, p.o. intake has been lower causing lower blood sugar. PLAN:  Continue on low-dose Humalog coverage. We will hold off any oral  meds until the patient's p.o. intake has been improving. Might start on  Nesina in a couple of days. Hold any basilar mealtime insulin. Continue low-dose Humalog coverage. Blood sugar goal 140-200. Avoid  hypoglycemia. Long-term A1c goal of 7 or lower. Thank you for the consult.         Meaghan Lopez MD    D: 03/04/2021 22:29:14       T: 03/04/2021 22:37:55     [email or web address]  Job#: [id number]     Doc#: [id number]    CC:

## 2021-03-05 NOTE — PROGRESS NOTES
Zaina Dickerson 39                                                   Consult Note      [facility] RECORD NUMBER:  [id number]  AGE: 78 y.o. GENDER: male  : 1950  EPISODE DATE:  3/3/2021    Subjective:     Chief Complaint   Patient presents with    Hyperglycemia         HISTORY of PRESENT ILLNESS HPI     Denver Ash is a 78 y.o. male who presents today for wound/ulcer evaluation. History of Wound Context: Admitted to [facility] with unstageable pressure injury to coccyx and left posterior upper thigh. Patient is seen regularly in [address] [place] [address] with non-healing sacral/hip wounds. At last visit in [facility] sacral wound was much improved, smaller, and clean. Today wound bed is covered with dark, red, old clotted drainage. Surrounding wound edge is hyperkeratotic. Will debride to stimulate contraction of edges and clean wound base. Wound/Ulcer Pain Timing/Severity: intermittent  Quality of pain: tender  Severity:  1 / 10   Modifying Factors: Pain worsens with care and subsides following completion of.    Associated Signs/Symptoms: none    Ulcer Identification:  Ulcer Type: pressure  Contributing Factors: chronic pressure, decreased mobility, shear force, anticoagulation therapy, malnutrition and incontinence of stool    Wound: N/A        PAST MEDICAL HISTORY        Diagnosis Date    Anemia     Chronic indwelling Ha catheter     Chronic UTI     Chronic UTI     Dermatitis     [facility]) [facility]     DM type 2 (diabetes mellitus, type 2) ([facility] 10/29/2013    Eczema     Gastric polyp     GERD (gastroesophageal reflux disease)     H/O bone density study 9.2.11    lumbar spine, lt femoral neck osteopenia, rt femoral neck normal    High blood pressure     HTN (hypertension)     Hypokalemia     Hypovitaminosis D     VANIA (iron deficiency anemia)     MR (mental retardation)     Neurogenic dysfunction of the urinary bladder 2019    Osteopenia     Pressure ulcer of coccygeal region, unstageable ([facility] [address] 09/17/2020    Seizure disorder ([facility] [facility]     Wound of back        PAST SURGICAL HISTORY    Past Surgical History:   Procedure Laterality Date    COLONOSCOPY  8.19.11    negative    UPPER GASTROINTESTINAL ENDOSCOPY  8.19.11    by Dr. Tadeo Castillo N/A 11/7/2019    EGD performed by Jan Butt MD at [address]    History reviewed. No pertinent family history. SOCIAL HISTORY    Social History     Tobacco Use    Smoking status: Never Smoker    Smokeless tobacco: Never Used   Substance Use Topics    Alcohol use: No    Drug use: No       ALLERGIES    No Known Allergies    MEDICATIONS    No current facility-administered medications on file prior to encounter.       Current Outpatient Medications on File Prior to Encounter   Medication Sig Dispense Refill    sertraline (ZOLOFT) 100 MG tablet TAKE ONE (1) TABLET BY MOUTH ONCE (1) DAILY IN THE MORNING (ANTI-DEPRESSANT) 90 tablet 0    loratadine (CLARITIN) 10 MG tablet TAKE ONE (1) TABLET BY MOUTH ONCE (1) DAILY (PRURITIC DERMATITUS) 90 tablet 0    vitamin B-12 (CYANOCOBALAMIN) 1000 MCG tablet Take 1,000 mcg by mouth daily      JANUVIA 100 MG tablet TAKE ONE (1) TABLET BY MOUTH ONCE (1) DAILY 30 tablet 0    aspirin 81 MG chewable tablet Take 81 mg by mouth daily      folic acid (FOLVITE) 1 MG tablet Take 1 mg by mouth daily      amLODIPine (NORVASC) 5 MG tablet Take 5 mg by mouth daily      atorvastatin (LIPITOR) 10 MG tablet Take 10 mg by mouth daily      busPIRone (BUSPAR) 15 MG tablet Take 15 mg by mouth 3 times daily      cloNIDine (CATAPRES) 0.1 MG tablet Take 0.1 mg by mouth 3 times daily      Cranberry 500 MG CAPS Take 500 mg by mouth 2 times daily      ferrous sulfate 325 (65 Fe) MG tablet Take 325 mg by mouth 2 times daily      levETIRAcetam (KEPPRA) 750 MG tablet Take 750 mg by mouth 2 times daily      lisinopril (PRINIVIL;ZESTRIL) 40 MG tablet Take 40 mg by mouth 2 times daily      metFORMIN (GLUCOPHAGE) 500 MG tablet Take 750 mg by mouth 2 times daily (with meals)      Multiple Vitamin (MULTIVITAMINS PO) Take by mouth daily      calcium carbonate (OSCAL) 500 MG TABS tablet Take 500 mg by mouth 2 times daily      pantoprazole (PROTONIX) 40 MG tablet Take 40 mg by mouth daily      pioglitazone (ACTOS) 30 MG tablet Take 45 mg by mouth daily       Pyridoxine HCl (VITAMIN B-6) 50 MG tablet Take 25 mg by mouth daily      Cholecalciferol (VITAMIN D3) 2000 units CAPS Take by mouth Daily with lunch      loperamide (IMODIUM) 2 MG capsule Take 2 mg by mouth 4 times daily as needed for Diarrhea      Na Sulfate-K Sulfate-Mg Sulf 17.5-3.13-1.6 GM/177ML SOLN As directed 1 Bottle 0    Lancet Device MISC 1 Device by Does not apply route once for 1 dose 1 Device 0    ammonium lactate (LAC-HYDRIN) 12 % lotion Apply topically as needed for Dry Skin Apply topically as needed.  Misc. Devices [facility] MISC Manual wheel chair with foot petals  A50.49 1 each 0    Elastic Bandages & Supports (T.E.D. KNEE LENGTH/M-REGULAR) MISC 1 set of MINERVA stockings. Apply daily while upright in wheelchair as needed for dependent edema control. Pharmacy/ DME may adjust size as appropriate with corrected measurement 1 each 0    acetaminophen (TYLENOL) 325 MG tablet Take 325 mg by mouth daily      ondansetron (ZOFRAN) 4 MG tablet Take 4 mg by mouth every 6 hours as needed for Nausea or Vomiting      promethazine (PHENERGAN) 25 MG suppository Place 25 mg rectally every 6 hours as needed for Nausea         REVIEW OF SYSTEMS    Pertinent items are noted in HPI.     Objective:      BP (!) 139/90   Pulse 95   Temp 98.3 °F (36.8 °C) (Oral)   Resp 16   Ht 5' 10\" (1.778 m)   Wt 112 lb 14 oz (51.2 kg)   SpO2 (!) 81%   BMI 16.20 kg/m²     Wt Readings from Last 3 Encounters:   03/05/21 112 lb 14 oz (51.2 kg)   09/11/20 155 lb (70.3 kg)   09/18/19 155 lb (70.3 kg)       PHYSICAL EXAM    Constitutional:   Thin, cachexic male with development disabilities. Appears neat and clean. Patient is alert, non-verbal-at normal baseline, and in no apparent distress. Respiratory:  Respiratory effort is easy and symmetric bilaterally. Rate is normal at rest and on room air. Vascular:  Pedal Pulses is not palpable and audible with doppler. Capillary refill is <3 sec to digits bilateral.  Extremities positive for pitting edema with right dorsal aspect of foot 3+ edema. Neurological:   Unable to assess since patient is non-verbal. During wound care, per faces scale, discomfort does not appear greater than five. Dermatological:  Wound description noted in wound assessment. The sacral wound is dark red, with wound bed obscured by dark dried red drainage, with hyperkeratotic wound edge. Hip wound is unstageable and covered by soft tan/gray eschar, no drainage. Will need pluragel/santyl to soften sufficiently to allow debriding. Psychiatric:   Appropriate interactions and affect. Appears at normal baseline. When provider makes eye contact with patient and asks direct questions, patient attempts to verbalize a response. Assessment:      Problem List Items Addressed This Visit     UTI (urinary tract infection) - Primary      Other Visit Diagnoses     Hypokalemia        Hyperglycemia               Procedure Note  Indications:  Based on my examination of this patient's wound(s)/ulcer(s) today, debridement is required to promote healing and evaluate the wound base. Performed by: WILI Murry NP    Consent obtained:  Yes    Time out taken:  Yes    Pain Control:         Debridement:Excisional Debridement    Using curette the wound(s)/ulcer(s) was/were sharply debrided down through and including the removal of epidermis, dermis and subcutaneous tissue.         Devitalized Tissue Debrided:  fibrin, biofilm, slough and clots (dried)     Pre Debridement Measurements:  Are located in the [employer]  Documentation Flow Sheet    Wound/Ulcer #: 1    Post Debridement Measurements:  Wound/Ulcer Descriptions are Pre Debridement except measurements:    Wound 09/17/20 Coccyx #1 (Active)   Wound Image   03/04/21 1210   Wound Etiology Pressure Unstageable 03/05/21 1059   Dressing Status Clean; Intact;Dry 03/05/21 1059   Wound Cleansed Cleansed with saline 03/04/21 0503   Dressing/Treatment Other (comment) 03/05/21 1059   Offloading for Diabetic Foot Ulcers Yes (type) 03/04/21 1210   Wound Length (cm) 1.5 cm 03/04/21 1210   Wound Width (cm) 0.6 cm 03/04/21 1210   Wound Depth (cm) 0.7 cm 02/25/21 1021   Wound Surface Area (cm^2) 0.9 cm^2 03/04/21 1210   Change in Wound Size % (l*w) 95.5 03/04/21 1210   Wound Volume (cm^3) 0.59 cm^3 02/25/21 1021   Wound Healing % 85 02/25/21 1021   Undermining Starts ___ O'Clock 12 02/25/21 1021   Undermining Ends___ O'Clock 6 02/25/21 1021   Undermining Maxium Distance (cm) 0.4 02/25/21 1021   Wound Assessment Eschar moist 03/04/21 1210   Drainage Amount Small 03/04/21 1210   Drainage Description Serosanguinous 03/04/21 1210   Odor None 03/04/21 1210   Sangeetha-wound Assessment Fragile;Denuded 03/04/21 1210   Margins Defined edges 03/04/21 1210   Wound Thickness Description not for Pressure Injury Full thickness 03/04/21 1210   Number of days: 169       Wound 02/25/21 #2 left posterior upper thigh (Active)   Wound Image   03/04/21 1210   Wound Etiology Pressure Unstageable 03/04/21 1210   Dressing Status Clean;Dry; Intact 03/05/21 1059   Wound Cleansed Cleansed with saline 02/25/21 1021   Dressing/Treatment Other (comment) 03/05/21 1059   Offloading for Diabetic Foot Ulcers Yes (type) 03/04/21 1210   Wound Length (cm) 3 cm 03/04/21 1210   Wound Width (cm) 3.5 cm 03/04/21 1210   Wound Depth (cm) 0.1 cm 02/25/21 1021   Wound Surface Area (cm^2) 10.5 cm^2 03/04/21 1210   Change in Wound Size % (l*w) -100 03/04/21 1210   Wound Volume (cm^3) 0.52 cm^3 02/25/21 1021   Wound Assessment Eschar dry;Eschar moist 03/04/21 1210   Drainage Amount Scant 03/04/21 1210   Drainage Description Yellow 03/04/21 1210   Odor None 03/05/21 1059   Sangeetha-wound Assessment Fragile; Intact 03/05/21 1059   Margins Defined edges 03/05/21 1059   Wound Thickness Description not for Pressure Injury Full thickness 03/04/21 1210   Number of days: 8            Percent of Wound/Ulcer Debrided: 100%    Total Surface Area Debrided:  0.9 sq cm     Diabetic/Pressure/Non Pressure Ulcers:  Ulcer: Pressure ulcer, Stage 3      Bleeding:  Minimal    Hemostasis Achieved:  by pressure    Procedural Pain:  0  / 10     Post Procedural Pain:  0 / 10     Response to treatment:  Well tolerated by patient. Plan:     Continue daily applications of Pluragel to both sacral and hip wound, cover with 4 x 4, then may secure with mepilex border dressing.                  Electronically signed by WILI Moise NP on 3/5/2021 at 2:10 PM

## 2021-03-05 NOTE — PROGRESS NOTES
Pt alert but nonverbal. Received 1 unit of insulin with a blood sugar of 170. Ate entire apple sauce with meds. Q2 turns per protocol. Call bell within reach. Bed low & locked. Bed alarm on. Will continue to monitor.      Electronically signed by Jan Iglesias RN on 3/5/2021 at 2:36 AM

## 2021-03-06 VITALS
SYSTOLIC BLOOD PRESSURE: 131 MMHG | WEIGHT: 117.95 LBS | DIASTOLIC BLOOD PRESSURE: 80 MMHG | BODY MASS INDEX: 16.89 KG/M2 | RESPIRATION RATE: 14 BRPM | OXYGEN SATURATION: 99 % | HEART RATE: 94 BPM | TEMPERATURE: 97.3 F | HEIGHT: 70 IN

## 2021-03-06 LAB
ANION GAP SERPL CALCULATED.3IONS-SCNC: 9 MEQ/L (ref 9–15)
BASOPHILS ABSOLUTE: 0 K/UL (ref 0–0.2)
BASOPHILS RELATIVE PERCENT: 0.3 %
BUN BLDV-MCNC: 17 MG/DL (ref 8–23)
CALCIUM SERPL-MCNC: 7.9 MG/DL (ref 8.5–9.9)
CHLORIDE BLD-SCNC: 111 MEQ/L (ref 95–107)
CO2: 27 MEQ/L (ref 20–31)
CREAT SERPL-MCNC: 0.3 MG/DL (ref 0.7–1.2)
EOSINOPHILS ABSOLUTE: 0.1 K/UL (ref 0–0.7)
EOSINOPHILS RELATIVE PERCENT: 1 %
GFR AFRICAN AMERICAN: >60
GFR NON-AFRICAN AMERICAN: >60
GLUCOSE BLD-MCNC: 264 MG/DL (ref 60–115)
GLUCOSE BLD-MCNC: 265 MG/DL (ref 70–99)
GLUCOSE BLD-MCNC: 310 MG/DL (ref 60–115)
HCT VFR BLD CALC: 31 % (ref 42–52)
HEMOGLOBIN: 10 G/DL (ref 14–18)
LYMPHOCYTES ABSOLUTE: 1.5 K/UL (ref 1–4.8)
LYMPHOCYTES RELATIVE PERCENT: 16.2 %
MAGNESIUM: 1.5 MG/DL (ref 1.7–2.4)
MCH RBC QN AUTO: 29.2 PG (ref 27–31.3)
MCHC RBC AUTO-ENTMCNC: 32.3 % (ref 33–37)
MCV RBC AUTO: 90.6 FL (ref 80–100)
MONOCYTES ABSOLUTE: 0.4 K/UL (ref 0.2–0.8)
MONOCYTES RELATIVE PERCENT: 4.1 %
NEUTROPHILS ABSOLUTE: 7.4 K/UL (ref 1.4–6.5)
NEUTROPHILS RELATIVE PERCENT: 78.4 %
ORGANISM: ABNORMAL
ORGANISM: ABNORMAL
PDW BLD-RTO: 15.3 % (ref 11.5–14.5)
PERFORMED ON: ABNORMAL
PERFORMED ON: ABNORMAL
PLATELET # BLD: 182 K/UL (ref 130–400)
POTASSIUM REFLEX MAGNESIUM: 3.5 MEQ/L (ref 3.4–4.9)
RBC # BLD: 3.42 M/UL (ref 4.7–6.1)
SODIUM BLD-SCNC: 147 MEQ/L (ref 135–144)
URINE CULTURE, ROUTINE: ABNORMAL
URINE CULTURE, ROUTINE: ABNORMAL
WBC # BLD: 9.5 K/UL (ref 4.8–10.8)

## 2021-03-06 PROCEDURE — 80048 BASIC METABOLIC PNL TOTAL CA: CPT

## 2021-03-06 PROCEDURE — 6360000002 HC RX W HCPCS: Performed by: INTERNAL MEDICINE

## 2021-03-06 PROCEDURE — 6370000000 HC RX 637 (ALT 250 FOR IP): Performed by: INTERNAL MEDICINE

## 2021-03-06 PROCEDURE — 83735 ASSAY OF MAGNESIUM: CPT

## 2021-03-06 PROCEDURE — 2580000003 HC RX 258: Performed by: INTERNAL MEDICINE

## 2021-03-06 PROCEDURE — 36415 COLL VENOUS BLD VENIPUNCTURE: CPT

## 2021-03-06 PROCEDURE — 85025 COMPLETE CBC W/AUTO DIFF WBC: CPT

## 2021-03-06 RX ORDER — CEPHALEXIN 500 MG/1
500 CAPSULE ORAL 2 TIMES DAILY
Qty: 6 CAPSULE | Refills: 0 | Status: SHIPPED | OUTPATIENT
Start: 2021-03-06 | End: 2021-03-09

## 2021-03-06 RX ORDER — SULFAMETHOXAZOLE AND TRIMETHOPRIM 800; 160 MG/1; MG/1
1 TABLET ORAL 2 TIMES DAILY
Qty: 6 TABLET | Refills: 0 | Status: SHIPPED | OUTPATIENT
Start: 2021-03-06 | End: 2021-03-09

## 2021-03-06 RX ORDER — INSULIN GLARGINE 100 [IU]/ML
25 INJECTION, SOLUTION SUBCUTANEOUS NIGHTLY
Status: DISCONTINUED | OUTPATIENT
Start: 2021-03-06 | End: 2021-03-06 | Stop reason: HOSPADM

## 2021-03-06 RX ADMIN — BUSPIRONE HYDROCHLORIDE 15 MG: 10 TABLET ORAL at 13:03

## 2021-03-06 RX ADMIN — ASPIRIN 81 MG: 81 TABLET, CHEWABLE ORAL at 08:43

## 2021-03-06 RX ADMIN — LISINOPRIL 40 MG: 20 TABLET ORAL at 08:44

## 2021-03-06 RX ADMIN — MAGNESIUM SULFATE HEPTAHYDRATE 2000 MG: 40 INJECTION, SOLUTION INTRAVENOUS at 11:00

## 2021-03-06 RX ADMIN — AMLODIPINE BESYLATE 5 MG: 5 TABLET ORAL at 08:45

## 2021-03-06 RX ADMIN — CYANOCOBALAMIN TAB 500 MCG 1000 MCG: 500 TAB at 08:43

## 2021-03-06 RX ADMIN — Medication 25 MG: at 08:43

## 2021-03-06 RX ADMIN — CEFTRIAXONE SODIUM 1000 MG: 1 INJECTION, POWDER, FOR SOLUTION INTRAMUSCULAR; INTRAVENOUS at 13:04

## 2021-03-06 RX ADMIN — ENOXAPARIN SODIUM 40 MG: 40 INJECTION SUBCUTANEOUS at 08:43

## 2021-03-06 RX ADMIN — PANTOPRAZOLE SODIUM 40 MG: 40 TABLET, DELAYED RELEASE ORAL at 08:44

## 2021-03-06 RX ADMIN — FERROUS SULFATE TAB 325 MG (65 MG ELEMENTAL FE) 325 MG: 325 (65 FE) TAB at 08:44

## 2021-03-06 RX ADMIN — SODIUM CHLORIDE: 4.5 INJECTION, SOLUTION INTRAVENOUS at 05:58

## 2021-03-06 RX ADMIN — LEVETIRACETAM 750 MG: 500 TABLET ORAL at 08:44

## 2021-03-06 RX ADMIN — CETIRIZINE HYDROCHLORIDE 10 MG: 10 TABLET, FILM COATED ORAL at 08:43

## 2021-03-06 RX ADMIN — POTASSIUM CHLORIDE 40 MEQ: 20 TABLET, EXTENDED RELEASE ORAL at 11:00

## 2021-03-06 RX ADMIN — ATORVASTATIN CALCIUM 10 MG: 10 TABLET, FILM COATED ORAL at 08:44

## 2021-03-06 RX ADMIN — CLONIDINE HYDROCHLORIDE 0.1 MG: 0.1 TABLET ORAL at 08:44

## 2021-03-06 RX ADMIN — BUSPIRONE HYDROCHLORIDE 15 MG: 10 TABLET ORAL at 08:44

## 2021-03-06 RX ADMIN — CLONIDINE HYDROCHLORIDE 0.1 MG: 0.1 TABLET ORAL at 13:02

## 2021-03-06 RX ADMIN — SODIUM CHLORIDE, PRESERVATIVE FREE 10 ML: 5 INJECTION INTRAVENOUS at 08:45

## 2021-03-06 RX ADMIN — FOLIC ACID 1 MG: 1 TABLET ORAL at 08:45

## 2021-03-06 NOTE — PLAN OF CARE
Problem: Falls - Risk of:  Goal: Will remain free from falls  Description: Will remain free from falls  Outcome: Ongoing  Goal: Absence of physical injury  Description: Absence of physical injury  Outcome: Ongoing     Problem: Skin Integrity:  Goal: Will show no infection signs and symptoms  Description: Will show no infection signs and symptoms  Outcome: Ongoing  Goal: Absence of new skin breakdown  Description: Absence of new skin breakdown  Outcome: Ongoing     Problem: Nutrition  Goal: Optimal nutrition therapy  Outcome: Ongoing    Electronically signed by Rafi Varela RN on 3/5/2021 at 9:39 PM

## 2021-03-06 NOTE — DISCHARGE INSTR - DIET

## 2021-03-06 NOTE — PLAN OF CARE
Problem: Falls - Risk of:  Goal: Will remain free from falls  Description: Will remain free from falls  3/6/2021 0345 by Hedy Batista RN  Outcome: Ongoing  3/5/2021 2139 by Hedy Batista RN  Outcome: Ongoing  Goal: Absence of physical injury  Description: Absence of physical injury  3/6/2021 0345 by Hedy Batista RN  Outcome: Ongoing  3/5/2021 2139 by Hedy Batista RN  Outcome: Ongoing     Problem: Skin Integrity:  Goal: Will show no infection signs and symptoms  Description: Will show no infection signs and symptoms  3/6/2021 0345 by Hedy Batista RN  Outcome: Ongoing  3/5/2021 2139 by Hedy Batista RN  Outcome: Ongoing  Goal: Absence of new skin breakdown  Description: Absence of new skin breakdown  3/6/2021 0345 by Hedy Batista RN  Outcome: Ongoing  3/5/2021 2139 by Hedy Batista RN  Outcome: Ongoing     Problem: Nutrition  Goal: Optimal nutrition therapy  3/6/2021 0345 by Hedy Batista RN  Outcome: Ongoing  3/5/2021 2139 by Hedy Batista RN  Outcome: Ongoing

## 2021-03-06 NOTE — PROGRESS NOTES
Pt is alert and was able to use call light when realized he had a BM. BM was large. Sangeetha care was done and new dressings on his bottom applied due to being soiled. Received 2 units of insulin for a blood sugar of 340. Took meds very easily with a full cup of apple sauce. Vitals stable. Call light within reach. Bed low and locked. Bed Alarm on. Will continue to monitor and Q2 turn. Epic Downtime starting at 0100.      Electronically signed by Ashwin Ramirez RN on 3/5/2021 at 10:19 PM

## 2021-03-06 NOTE — PROGRESS NOTES
[phone]     CHEMISTRIES:  Recent Labs     03/03/21  1944 03/04/21  0703 03/05/21  0638 03/06/21  0655   * 148* 150* 147*   K 2.5* 3.1* 3.7 3.5    106 113* 111*   CO2 34* 29 29 27   BUN 17 21 18 17   CREATININE 0.40* 0.40* 0.30* 0.30*   GLUCOSE 426* 36* 157* 265*   MG 1.5* 1.5*  --  1.5*     PT/INR:No results for input(s): PROTIME, INR in the last 72 hours. APTT:No results for input(s): APTT in the last 72 hours. LIVER PROFILE:  Recent Labs     03/03/21  1336   AST 14   ALT 12   BILITOT 0.3   ALKPHOS 197*       Imaging Last 24 Hours:  No results found. Assessment//Plan           Hospital Problems           Last Modified POA    Chronic ulcer of sacral region, with unspecified severity ([address] 3/5/2021 Yes    Sacral wound, subsequent encounter 3/5/2021 Yes    Wound, open, hip or thigh with complication 7/6/2841 Yes    UTI (urinary tract infection) 3/3/2021 Yes    Uncontrolled type 2 diabetes mellitus with hyperglycemia ([address] 3/4/2021 Yes        Assessment:    Condition: In stable condition. Improving. (Uncontrolled type 2 diabetes blood glucose levels have been improving due to improved p.o. intake  UTI  Sacral wound). Plan:   (Will add bedtime Lantus and mealtime Humalog in the a.m. to keep blood sugars between 1   Continue IV antibiotics Rocephin  Monitor glycemic control closely).        Electronically signed by Yenny Tim MD on 3/6/21 at 12:26 PM EST

## 2021-03-06 NOTE — CARE COORDINATION
PATIENT PER NURSING READY FOR D/C BACK TO THE [facility]. CALL PLACED TO [facility] FOR TRANSPORT THEY WILL BE HERE WITHIN THE HOUR TO  THE PATIENT. NURSING AND  NOTIFIED OF TIME OF . NURSING TO CALL REPORT.

## 2021-03-06 NOTE — PROGRESS NOTES
Patient assessed and charted on by this RN. Vital signs are stable. Nonverbal, feed for meals, tolerated pills whole in applesauce. Chronic gayle, incontinent of stool, had large BM. Dressings changed as ordered. Q2 turned. Fall precautions are in place. Call light is in reach. Will continue to monitor. Given per protocol potassium and Magnesium based on labs. Called Report to [facility], nurse Gregoria Bee informed on D/C instructions and informed on pickup from [facility]. Gregoria Bee stated okay for him to go back and no more questions.

## 2021-03-08 ENCOUNTER — CARE COORDINATION (OUTPATIENT)
Dept: CASE MANAGEMENT | Age: 71
End: 2021-03-08

## 2021-03-08 NOTE — CARE COORDINATION
[facility] Update     3/8/2021    Patient: Audra Ramos Patient : 1950   MRN: [id number]  Reason for Admission: 3/3-3/6/2021 ED [facility] Cath/UTI, Altered mental status, Hyperglycemia.   Discharge Date: 3/6/21 RARS: Readmission Risk Score: 22  Care Transitions       Care Transitions Post Acute Facility Update    Care Transitions Interventions  Post Acute Facility: [facility]

## 2021-03-11 ENCOUNTER — HOSPITAL ENCOUNTER (OUTPATIENT)
Dept: WOUND CARE | Age: 71
Discharge: HOME | End: 2021-03-11
Payer: MEDICAID

## 2021-03-11 VITALS
TEMPERATURE: 97 F | SYSTOLIC BLOOD PRESSURE: 167 MMHG | HEART RATE: 78 BPM | DIASTOLIC BLOOD PRESSURE: 93 MMHG | RESPIRATION RATE: 18 BRPM

## 2021-03-11 PROCEDURE — 11042 DBRDMT SUBQ TIS 1ST 20SQCM/<: CPT | Performed by: NURSE PRACTITIONER

## 2021-03-11 PROCEDURE — 11042 DBRDMT SUBQ TIS 1ST 20SQCM/<: CPT

## 2021-03-11 ASSESSMENT — PAIN SCALES - GENERAL: PAINLEVEL_OUTOF10: 0

## 2021-03-11 NOTE — PROGRESS NOTES
Zaina Dickerson 39                                                   Progress Note and Procedure Note      Winifred Vaughn  MEDICAL RECORD NUMBER:  [id number]  AGE: 78 y.o. GENDER: male  : 1950  EPISODE DATE:  3/11/2021    Subjective:     Chief Complaint   Patient presents with    Wound Check     coccyx and thigh wounds recheck         HISTORY of PRESENT ILLNESS HPI     Winifred Vaughn is a 78 y.o. male who presents today for wound/ulcer evaluation. History of Wound Context: Patient is known to [facility] with non-healing pressure ulcer of sacrum. Was hospitalized recently with catheter associated UTI, and hypomagnesemia, on presentation for that admit patient was noted to have newly developed wound to the left posterior upper thigh. Patient is MRDD and in a small facility that performs his wound care. Today the left posterior thigh wound is unstageable, covered with soft brown eschar with surrounding margin of fibrotic yellow slough. Sacral wound dark, has small central area in the wound of necrotic tissue. Wound/Ulcer Pain Timing/Severity: intermittent  Quality of pain: tender  Severity:  3 / 10   Modifying Factors: Pain worsens with care, and subsides following completion of.    Associated Signs/Symptoms: drainage    Ulcer Identification:  Ulcer Type: pressure  Contributing Factors: diabetes, chronic pressure, shear force, malnutrition and incontinence of stool    Wound: N/A        PAST MEDICAL HISTORY        Diagnosis Date    Anemia     Chronic indwelling Ah catheter     Chronic UTI     Chronic UTI     Dermatitis     DM [facility]) [facility]     DM type 2 (diabetes mellitus, type 2) ([facility] [address] 10/29/2013    Eczema     Gastric polyp     GERD (gastroesophageal reflux disease)     H/O bone density study 9.2.11    lumbar spine, lt femoral neck osteopenia, rt femoral neck normal    High blood pressure     HTN (hypertension)     Hypokalemia     Hypovitaminosis D     VANIA (iron deficiency anemia)     MR (mental retardation)     Neurogenic dysfunction of the urinary bladder 1/22/2019    Osteopenia     Pressure ulcer of coccygeal region, unstageable ([address] 09/17/2020    Seizure disorder (HCC)     Wound of back        PAST SURGICAL HISTORY    Past Surgical History:   Procedure Laterality Date    COLONOSCOPY  8.19.11    negative    UPPER GASTROINTESTINAL ENDOSCOPY  8.19.11    by Dr. Naye Bell 11/7/2019    EGD performed by Roz Cooper MD at [facility]       Problem List:    Patient Active Problem List   Diagnosis    Disorder of intellectual development    Diabetes mellitus due to underlying condition with stage 1 chronic kidney disease, without long-term current use of insulin ([address]    Seizure disorder ([address]    GERD (gastroesophageal reflux disease)    Chronic UTI    Osteopenia    Dermatitis    Eczema    Hypovitaminosis D    VANIA (iron deficiency anemia)    Chronic indwelling Ha catheter    Essential hypertension    Full code status    Dietary restriction    Degenerative arthritis of hip    Neurogenic dysfunction of the urinary bladder    Foot deformity    Chronic ulcer of sacral region, with unspecified severity ([facility])    Ulcer of perianal area, limited to breakdown of skin ([address]    Pressure injury of heel, stage 1    Sacral wound, subsequent encounter    Wound, open, hip or thigh with complication    UTI (urinary tract infection)    Uncontrolled type 2 diabetes mellitus with hyperglycemia ([address]        FAMILY HISTORY    History reviewed. No pertinent family history.     SOCIAL HISTORY    Social History     Tobacco Use    Smoking status: Never Smoker    Smokeless tobacco: Never Used   Substance Use Topics    Alcohol use: No    Drug use: No       ALLERGIES    No Known Allergies    MEDICATIONS    Current Outpatient Medications on File Prior to Encounter   Medication Sig Dispense Refill    sertraline (ZOLOFT) 100 MG tablet TAKE ONE (1) TABLET BY MOUTH ONCE (1) DAILY IN THE MORNING (ANTI-DEPRESSANT) 90 tablet 0    loratadine (CLARITIN) 10 MG tablet TAKE ONE (1) TABLET BY MOUTH ONCE (1) DAILY (PRURITIC DERMATITUS) 90 tablet 0    vitamin B-12 (CYANOCOBALAMIN) 1000 MCG tablet Take 1,000 mcg by mouth daily      JANUVIA 100 MG tablet TAKE ONE (1) TABLET BY MOUTH ONCE (1) DAILY 30 tablet 0    loperamide (IMODIUM) 2 MG capsule Take 2 mg by mouth 4 times daily as needed for Diarrhea      Na Sulfate-K Sulfate-Mg Sulf 17.5-3.13-1.6 GM/177ML SOLN As directed 1 Bottle 0    Lancet Device MISC 1 Device by Does not apply route once for 1 dose 1 Device 0    ammonium lactate (LAC-HYDRIN) 12 % lotion Apply topically as needed for Dry Skin Apply topically as needed.  aspirin 81 MG chewable tablet Take 81 mg by mouth daily      folic acid (FOLVITE) 1 MG tablet Take 1 mg by mouth daily      [facility]) MISC Manual wheel chair with foot petals  A50.49 1 each 0    Elastic Bandages & Supports (T.E.D. KNEE LENGTH/M-REGULAR) MISC 1 set of MINERVA stockings. Apply daily while upright in wheelchair as needed for dependent edema control.   Pharmacy/ DME may adjust size as appropriate with corrected measurement 1 each 0    amLODIPine (NORVASC) 5 MG tablet Take 5 mg by mouth daily      acetaminophen (TYLENOL) 325 MG tablet Take 325 mg by mouth daily      atorvastatin (LIPITOR) 10 MG tablet Take 10 mg by mouth daily      busPIRone (BUSPAR) 15 MG tablet Take 15 mg by mouth 3 times daily      cloNIDine (CATAPRES) 0.1 MG tablet Take 0.1 mg by mouth 3 times daily      Cranberry 500 MG CAPS Take 500 mg by mouth 2 times daily      ferrous sulfate 325 (65 Fe) MG tablet Take 325 mg by mouth 2 times daily      levETIRAcetam (KEPPRA) 750 MG tablet Take 750 mg by mouth 2 times daily      lisinopril (PRINIVIL;ZESTRIL) 40 MG tablet Take 40 mg by mouth 2 times daily      metFORMIN (GLUCOPHAGE) 500 MG tablet Take 750 mg by mouth 2 times daily (with meals)  Multiple Vitamin (MULTIVITAMINS PO) Take by mouth daily      calcium carbonate (OSCAL) 500 MG TABS tablet Take 500 mg by mouth 2 times daily      pantoprazole (PROTONIX) 40 MG tablet Take 40 mg by mouth daily      pioglitazone (ACTOS) 30 MG tablet Take 45 mg by mouth daily       Pyridoxine HCl (VITAMIN B-6) 50 MG tablet Take 25 mg by mouth daily      Cholecalciferol (VITAMIN D3) 2000 units CAPS Take by mouth Daily with lunch      ondansetron (ZOFRAN) 4 MG tablet Take 4 mg by mouth every 6 hours as needed for Nausea or Vomiting      promethazine (PHENERGAN) 25 MG suppository Place 25 mg rectally every 6 hours as needed for Nausea       No current facility-administered medications on file prior to encounter. REVIEW OF SYSTEMS    Pertinent items are noted in HPI. Objective:      BP (!) 167/93   Pulse 78   Temp 97 °F (36.1 °C) (Temporal)   Resp 18     Wt Readings from Last 3 Encounters:   03/06/21 117 lb 15.1 oz (53.5 kg)   09/11/20 155 lb (70.3 kg)   09/18/19 155 lb (70.3 kg)       PHYSICAL EXAM    Constitutional:   Well nourished and well developed. Appears neat and clean. Patient is alert, although non-verbal due to developmental disabilities, when addressed directly makes responsive sounds acknowledging provider. Respiratory:  Respiratory effort is easy and symmetric bilaterally. Rate is normal at rest and on room air. Vascular:  Edema to bilateral hands since admission remains. Neurological:   Gross and light touch intact. Dermatological:  Wound description noted in wound assessment. The sacral wound is mostly granular, has central area of necrosis in wound bed, with some amilcar wound maceration, though improved from on admission. Left upper thigh wound is dry and unstageable, covered with soft/black eschar with wound perimeter of yellow fibrotic tissue- will score to allow penetration of santyl. Psychiatric:  Appropriate interactions and affect.  Appears at normal baseline. Assessment:      Problem List Items Addressed This Visit     None           Procedure Note  Indications:  Based on my examination of this patient's wound(s)/ulcer(s) today, debridement is required to promote healing and evaluate the wound base. Performed by: WILI Ann NP    Consent obtained:  Yes    Time out taken:  Yes    Pain Control:   none - time outs provided whenever appears uncomfortable per faces scale. Debridement:Excisional Debridement    Using curette the wound(s)/ulcer(s) was/were sharply debrided down through and including the removal of epidermis, dermis and subcutaneous tissue. Devitalized Tissue Debrided:  fibrin, biofilm, slough and necrotic/eschar    Pre Debridement Measurements:  Are located in the [facility]  Documentation Flow Sheet    Wound/Ulcer #: 1 and 2    Post Debridement Measurements:  Wound/Ulcer Descriptions are Pre Debridement except measurements:    Wound 09/17/20 Coccyx #1 (Active)   Wound Image   03/11/21 0933   Wound Etiology Pressure Stage  3 03/11/21 0933   Dressing Status New dressing applied;Clean;Dry; Intact 03/06/21 0837   Wound Cleansed Cleansed with saline 03/11/21 0933   Dressing/Treatment Other (comment) 03/06/21 0837   Offloading for Diabetic Foot Ulcers Yes (type) 03/04/21 1210   Wound Length (cm) 2.2 cm 03/11/21 0933   Wound Width (cm) 0.5 cm 03/11/21 0933   Wound Depth (cm) 1.1 cm 03/11/21 0933   Wound Surface Area (cm^2) 1.1 cm^2 03/11/21 0933   Change in Wound Size % (l*w) 94.5 03/11/21 0933   Wound Volume (cm^3) 1.21 cm^3 03/11/21 0933   Wound Healing % 70 03/11/21 0933   Undermining Starts ___ O'Clock 12 03/11/21 0933   Undermining Ends___ O'Clock 6 03/11/21 0933   Undermining Maxium Distance (cm) 1.1 03/11/21 0933   Wound Assessment Granulation tissue;Slough 03/11/21 0933   Drainage Amount Moderate 03/11/21 0933   Drainage Description Serosanguinous 03/11/21 0933   Odor None 03/11/21 0933   Sangeetha-wound Assessment Fragile; Maceration 03/11/21 0933   Margins Defined edges 03/11/21 0933   Wound Thickness Description not for Pressure Injury Full thickness 03/04/21 1210   Number of days: 175       Wound 02/25/21 #2 left posterior upper thigh (Active)   Wound Image   03/11/21 0933   Wound Etiology Pressure Unstageable 03/11/21 0933   Dressing Status New dressing applied;Clean;Dry; Intact 03/06/21 0837   Wound Cleansed Cleansed with saline 03/11/21 0933   Dressing/Treatment Other (comment) 03/06/21 0837   Offloading for Diabetic Foot Ulcers Yes (type) 03/05/21 2034   Wound Length (cm) 3.8 cm 03/11/21 0933   Wound Width (cm) 4 cm 03/11/21 0933   Wound Depth (cm) 0.3 cm 03/11/21 0933   Wound Surface Area (cm^2) 15.2 cm^2 03/11/21 0933   Change in Wound Size % (l*w) -189.52 03/11/21 0933   Wound Volume (cm^3) 4.56 cm^3 03/11/21 0933   Wound Healing % -777 03/11/21 0933   Post-Procedure Length (cm) 3.8 cm 03/11/21 1002   Post-Procedure Width (cm) 4 cm 03/11/21 1002   Post-Procedure Depth (cm) 0.3 cm 03/11/21 1002   Post-Procedure Surface Area (cm^2) 15.2 cm^2 03/11/21 1002   Post-Procedure Volume (cm^3) 4.56 cm^3 03/11/21 1002   Wound Assessment Eschar dry;Granulation tissue 03/11/21 0933   Drainage Amount Large 03/11/21 0933   Drainage Description Green;Serosanguinous 03/11/21 0933   Odor Mild 03/11/21 0933   Sangeetha-wound Assessment Fragile;Dry/flaky 03/11/21 0933   Margins Undefined edges 03/11/21 0933   Wound Thickness Description not for Pressure Injury Full thickness 03/04/21 1210   Number of days: 13       Wound 03/11/21 Buttocks Left #3  (Active)   Wound Image   03/11/21 0933   Wound Etiology Pressure Stage  3 03/11/21 0933   Wound Cleansed Cleansed with saline 03/11/21 0933   Wound Length (cm) 2 cm 03/11/21 0933   Wound Width (cm) 1.7 cm 03/11/21 0933   Wound Depth (cm) 0.1 cm 03/11/21 0933   Wound Surface Area (cm^2) 3.4 cm^2 03/11/21 0933   Wound Volume (cm^3) 0.34 cm^3 03/11/21 0933   Wound Assessment Granulation tissue;Slough 03/11/21 0933   Drainage Amount Small 03/11/21 0933   Drainage Description Serosanguinous 03/11/21 0933   Odor None 03/11/21 0933   Sangeetha-wound Assessment Fragile;Dry/flaky 03/11/21 0933   Margins Defined edges 03/11/21 0933   Number of days: 0            Percent of Wound/Ulcer Debrided: 50% of wound #1, and wound #2    Total Surface Area Debrided:  8.5 sq cm     Diabetic/Pressure/Non Pressure Ulcers:  Ulcer: Pressure ulcer, deep tissue injury      Bleeding:  Minimal    Hemostasis Achieved:  by pressure    Procedural Pain:  2  / 10     Post Procedural Pain:  0 / 10     Response to treatment:  Well tolerated by patient. Plan:     Continue with dressing changes daily as listed below. Recheck in 2 weeks. Try to keep patient off of sacral area at all times, when up in wheelchair have slightly reclined and weight shifted to non-affected area and reposition frequently. Treatment Note please see attached Discharge Instructions    Written patient dismissal instructions given to patient and signed by patient or POA. Discharge [address] and [facility]    Physician Orders and Discharge Instructions   [address]   Telephone: [phone]      [phone]           NAME: Troy Tirado   DATE OF BIRTH: [address] [facility] RECORD NUMBER:  [id number]       Home Care/Facility: [facility]]      Wound Location:   Coccyx, Left Posterior upper thigh and Left Buttock    Dressing order:  1.Cleanse wound(s) with normal saline. 2. Apply a nickel thickness layer of SANTYL OINTMENT or PLUROGEL to the wound bed for   enzymatic debridement purposes. 3. Apply a moistened saline 4x4 (gauze pad) over the Santyl Ointment only or DSD over Plurogel. 4. Cover with additional dry 4x4's and paper tape. 5. Change Every Day.               Compression:       Offloading Device: Keep heels elevated off surfaces.  Gel cushion in wheelchair.  Use  Low air-loss mattress for bed. . turn side to side in bed. Up in chair to eat only and try to keep pressure off of wounds.       Other Instructions:       Keep all dressings clean, dry and intact.  Keep pressure off the wound(s) at all times.        Follow up visit  2  Weeks  March 25 , 2021 @  10:00       Please give 24 hour notice if unable to keep appointment. [phone]       If you experience any of the following, please call the Wound Care Service at  [phone] or go to the nearest emergency room.         *Increase in pain         *Temperature over 101           *Increase in drainage from your wound or a foul odor   *Uncontrolled swelling            *Need for compression bandage changes due to slippage, breakthrough drainage         PLEASE NOTE: IF YOU ARE UNABLE TO OBTAIN WOUND SUPPLIES, CONTINUE TO USE THE SUPPLIES YOU HAVE AVAILABLE UNTIL YOU ARE ABLE TO [facility]. IT IS MOST IMPORTANT TO KEEP THE WOUND COVERED AT ALL TIMES   Due to the COVID-19 surge and the mandate to reduce the Wound Center hours to only 2 days per week until further notice, the patient was told that if there is any worsening of the wound(s), he/she should call us immediately to prevent having to go to the Emergency Room.    Electronically signed by WILI Ku NP on 3/11/2021 at 10:06 AM        Electronically signed by WILI Ku NP on 3/11/2021 at 10:10 AM

## 2021-03-12 RX ORDER — SERTRALINE HYDROCHLORIDE 100 MG/1
TABLET, FILM COATED ORAL
Qty: 90 TABLET | Refills: 0 | OUTPATIENT
Start: 2021-03-12

## 2021-03-12 RX ORDER — LORATADINE 10 MG/1
TABLET ORAL
Qty: 90 TABLET | Refills: 0 | OUTPATIENT
Start: 2021-03-12

## 2021-03-25 ENCOUNTER — HOSPITAL ENCOUNTER (OUTPATIENT)
Dept: WOUND CARE | Age: 71
Discharge: HOME | End: 2021-03-25
Payer: MEDICAID

## 2021-03-25 VITALS
SYSTOLIC BLOOD PRESSURE: 117 MMHG | RESPIRATION RATE: 18 BRPM | DIASTOLIC BLOOD PRESSURE: 62 MMHG | TEMPERATURE: 97 F | HEART RATE: 72 BPM

## 2021-03-25 DIAGNOSIS — L98.429 CHRONIC ULCER OF SACRAL REGION, WITH UNSPECIFIED SEVERITY (HCC): ICD-10-CM

## 2021-03-25 DIAGNOSIS — S31.000D SACRAL WOUND, SUBSEQUENT ENCOUNTER: ICD-10-CM

## 2021-03-25 DIAGNOSIS — S71.001D OPEN WOUND OF RIGHT HIP AND THIGH WITH COMPLICATION, SUBSEQUENT ENCOUNTER: ICD-10-CM

## 2021-03-25 DIAGNOSIS — N18.1 DIABETES MELLITUS DUE TO UNDERLYING CONDITION WITH STAGE 1 CHRONIC KIDNEY DISEASE, WITHOUT LONG-TERM CURRENT USE OF INSULIN (HCC): Primary | ICD-10-CM

## 2021-03-25 DIAGNOSIS — S31.829D BUTTOCK WOUND, LEFT, SUBSEQUENT ENCOUNTER: ICD-10-CM

## 2021-03-25 DIAGNOSIS — E08.22 DIABETES MELLITUS DUE TO UNDERLYING CONDITION WITH STAGE 1 CHRONIC KIDNEY DISEASE, WITHOUT LONG-TERM CURRENT USE OF INSULIN (HCC): Primary | ICD-10-CM

## 2021-03-25 DIAGNOSIS — S71.101D OPEN WOUND OF RIGHT HIP AND THIGH WITH COMPLICATION, SUBSEQUENT ENCOUNTER: ICD-10-CM

## 2021-03-25 PROCEDURE — 87070 CULTURE OTHR SPECIMN AEROBIC: CPT

## 2021-03-25 PROCEDURE — 97597 DBRDMT OPN WND 1ST 20 CM/<: CPT

## 2021-03-25 PROCEDURE — 99214 OFFICE O/P EST MOD 30 MIN: CPT

## 2021-03-25 PROCEDURE — 11042 DBRDMT SUBQ TIS 1ST 20SQCM/<: CPT

## 2021-03-25 PROCEDURE — 87075 CULTR BACTERIA EXCEPT BLOOD: CPT

## 2021-03-25 PROCEDURE — 87186 SC STD MICRODIL/AGAR DIL: CPT

## 2021-03-25 PROCEDURE — 97597 DBRDMT OPN WND 1ST 20 CM/<: CPT | Performed by: NURSE PRACTITIONER

## 2021-03-25 PROCEDURE — 87205 SMEAR GRAM STAIN: CPT

## 2021-03-25 PROCEDURE — 87077 CULTURE AEROBIC IDENTIFY: CPT

## 2021-03-25 NOTE — PROGRESS NOTES
Zaina Dickerson [address]                                                   Progress Note and Procedure Note      Kory Brandon  MEDICAL RECORD NUMBER:  [id number]  AGE: 78 y.o. GENDER: male  : 1950  EPISODE DATE:  3/25/2021    Subjective:     Chief Complaint   Patient presents with    Wound Check     left buttocks /          HISTORY of PRESENT ILLNESS HPI     Kory Brandon is a 78 y.o. male who presents today for wound/ulcer evaluation. History of Wound Context: Patient is MRDD and in a small facility that performs his wound care. Today the left posterior thigh wound is unstageable, covered with soft brown eschar with surrounding margin of fibrotic yellow slough. Sacral wound more granular and clean than at last visit. Will debride posterior thigh/buttock wound and score eschar so more amenable to collagenase. Wound/Ulcer Pain Timing/Severity: intermittent  Quality of pain: tender  Severity:  2 / 10   Modifying Factors: Pain worsens with care and subsides following completion.    Associated Signs/Symptoms: none    Ulcer Identification:  Ulcer Type: pressure  Contributing Factors: chronic pressure, decreased mobility, shear force, incontinence of stool and incontinence of urine    Wound: N/A        PAST MEDICAL HISTORY        Diagnosis Date    Anemia     Chronic indwelling Ha catheter     Chronic UTI     Chronic UTI     Dermatitis     [facility]     DM type 2 (diabetes mellitus, type 2) ([facility] 10/29/2013    Eczema     Gastric polyp     GERD (gastroesophageal reflux disease)     H/O bone density study 9.2.11    lumbar spine, lt femoral neck osteopenia, rt femoral neck normal    High blood pressure     HTN (hypertension)     Hypokalemia     Hypovitaminosis D     VANIA (iron deficiency anemia)     MR (mental retardation)     Neurogenic dysfunction of the urinary bladder 2019    Osteopenia     Pressure ulcer of coccygeal region, unstageable ([facility] 2020    Seizure disorder ([address]     Wound of back        PAST SURGICAL HISTORY    Past Surgical History:   Procedure Laterality Date    COLONOSCOPY  8.19.11    negative    UPPER GASTROINTESTINAL ENDOSCOPY  8.19.11    by Dr. Jacklyn Feng 11/7/2019    EGD performed by Gaurav Smith MD at [facility]       Problem List:    Patient Active Problem List   Diagnosis    Disorder of intellectual development    Diabetes mellitus due to underlying condition with stage 1 chronic kidney disease, without long-term current use of insulin ([address]    Seizure disorder ([address]    GERD (gastroesophageal reflux disease)    Chronic UTI    Osteopenia    Dermatitis    Eczema    Hypovitaminosis D    VANIA (iron deficiency anemia)    Chronic indwelling Ha catheter    Essential hypertension    Full code status    Dietary restriction    Degenerative arthritis of hip    Neurogenic dysfunction of the urinary bladder    Foot deformity    Chronic ulcer of sacral region, with unspecified severity (HCC)    Ulcer of perianal area, limited to breakdown of skin ([address]    Pressure injury of heel, stage 1    Sacral wound, subsequent encounter    Wound, open, hip or thigh with complication    UTI (urinary tract infection)    Uncontrolled type 2 diabetes mellitus with hyperglycemia ([address]    Buttock wound, left, subsequent encounter        FAMILY HISTORY    History reviewed. No pertinent family history.     SOCIAL HISTORY    Social History     Tobacco Use    Smoking status: Never Smoker    Smokeless tobacco: Never Used   Substance Use Topics    Alcohol use: No    Drug use: No       ALLERGIES    No Known Allergies    MEDICATIONS    Current Outpatient Medications on File Prior to Encounter   Medication Sig Dispense Refill    sertraline (ZOLOFT) 100 MG tablet TAKE ONE (1) TABLET BY MOUTH ONCE (1) DAILY IN THE MORNING (ANTI-DEPRESSANT) 90 tablet 0    loratadine (CLARITIN) 10 MG tablet TAKE ONE (1) TABLET BY MOUTH ONCE (1) DAILY (PRURITIC DERMATITUS) 90 tablet 0    vitamin B-12 (CYANOCOBALAMIN) 1000 MCG tablet Take 1,000 mcg by mouth daily      JANUVIA 100 MG tablet TAKE ONE (1) TABLET BY MOUTH ONCE (1) DAILY 30 tablet 0    loperamide (IMODIUM) 2 MG capsule Take 2 mg by mouth 4 times daily as needed for Diarrhea      Na Sulfate-K Sulfate-Mg Sulf 17.5-3.13-1.6 GM/177ML SOLN As directed 1 Bottle 0    Lancet Device MISC 1 Device by Does not apply route once for 1 dose 1 Device 0    ammonium lactate (LAC-HYDRIN) 12 % lotion Apply topically as needed for Dry Skin Apply topically as needed.  aspirin 81 MG chewable tablet Take 81 mg by mouth daily      folic acid (FOLVITE) 1 MG tablet Take 1 mg by mouth daily      [facility]. Devices [facility]) MISC Manual wheel chair with foot petals  A50.49 1 each 0    Elastic Bandages & Supports (T.E.D. KNEE LENGTH/M-REGULAR) MISC 1 set of MINERVA stockings. Apply daily while upright in wheelchair as needed for dependent edema control.   Pharmacy/ DME may adjust size as appropriate with corrected measurement 1 each 0    amLODIPine (NORVASC) 5 MG tablet Take 5 mg by mouth daily      acetaminophen (TYLENOL) 325 MG tablet Take 325 mg by mouth daily      atorvastatin (LIPITOR) 10 MG tablet Take 10 mg by mouth daily      busPIRone (BUSPAR) 15 MG tablet Take 15 mg by mouth 3 times daily      cloNIDine (CATAPRES) 0.1 MG tablet Take 0.1 mg by mouth 3 times daily      Cranberry 500 MG CAPS Take 500 mg by mouth 2 times daily      ferrous sulfate 325 (65 Fe) MG tablet Take 325 mg by mouth 2 times daily      levETIRAcetam (KEPPRA) 750 MG tablet Take 750 mg by mouth 2 times daily      lisinopril (PRINIVIL;ZESTRIL) 40 MG tablet Take 40 mg by mouth 2 times daily      metFORMIN (GLUCOPHAGE) 500 MG tablet Take 750 mg by mouth 2 times daily (with meals)      Multiple Vitamin (MULTIVITAMINS PO) Take by mouth daily      calcium carbonate (OSCAL) 500 MG TABS tablet Take 500 mg by mouth 2 times mupirocin topical.     Psychiatric: No evidence of depression, anxiety, or agitation. Patient is calm, cooperative. Appropriate affect -  Appears at basline. Assessment:      Problem List Items Addressed This Visit     Buttock wound, left, subsequent encounter           Procedure Note  Indications:  Based on my examination of this patient's wound(s)/ulcer(s) today, debridement is required to promote healing and evaluate the wound base. Performed by: WILI Payne NP    Consent obtained:  Yes    Time out taken:  Yes    Pain Control:   none required. Debridement:Excisional Debridement    Using curette, forceps and scissors the wound(s)/ulcer(s) was/were sharply debrided down through and including the removal of dermis, epidermis, necrotic eschar. Devitalized Tissue Debrided:  fibrin, biofilm, slough and necrotic/eschar    Pre Debridement Measurements:  Are located in the [facility]  Documentation Flow Sheet    Wound/Ulcer #:     Post Debridement Measurements:  Wound/Ulcer Descriptions are Pre Debridement except measurements:    Wound 09/17/20 Coccyx #1 (Active)   Wound Image   03/25/21 1031   Wound Etiology Pressure Stage  3 03/25/21 1031   Dressing Status New dressing applied;Clean;Dry; Intact 03/06/21 0837   Wound Cleansed Cleansed with saline 03/25/21 1031   Dressing/Treatment Other (comment) 03/06/21 0837   Offloading for Diabetic Foot Ulcers Yes (type) 03/04/21 1210   Wound Length (cm) 2.6 cm 03/25/21 1031   Wound Width (cm) 2 cm 03/25/21 1031   Wound Depth (cm) 1 cm 03/25/21 1031   Wound Surface Area (cm^2) 5.2 cm^2 03/25/21 1031   Change in Wound Size % (l*w) 74 03/25/21 1031   Wound Volume (cm^3) 5.2 cm^3 03/25/21 1031   Wound Healing % -30 03/25/21 1031   Post-Procedure Length (cm) 2.2 cm 03/11/21 1002   Post-Procedure Width (cm) 0.5 cm 03/11/21 1002   Post-Procedure Depth (cm) 1.1 cm 03/11/21 1002   Post-Procedure Surface Area (cm^2) 1.1 cm^2 03/11/21 1002   Post-Procedure Volume (cm^3) 1.21 cm^3 03/11/21 1002   Undermining Starts ___ O'Clock 12 03/11/21 0933   Undermining Ends___ O'Clock 6 03/11/21 0933   Undermining Maxium Distance (cm) 1.1 03/11/21 0933   Wound Assessment Granulation tissue;Slough 03/25/21 1031   Drainage Amount Moderate 03/25/21 1031   Drainage Description Serosanguinous;Green 03/25/21 1031   Odor Mild 03/25/21 1031   Sangeetha-wound Assessment Intact;Fragile 03/25/21 1031   Margins Defined edges 03/25/21 1031   Wound Thickness Description not for Pressure Injury Full thickness 03/25/21 1031   Number of days: 189       Wound 02/25/21 #2 left posterior upper thigh (Active)   Wound Image   03/25/21 1031   Wound Etiology Pressure Unstageable 03/25/21 1031   Dressing Status New dressing applied;Clean;Dry; Intact 03/06/21 0837   Wound Cleansed Cleansed with saline 03/25/21 1031   Dressing/Treatment Other (comment) 03/06/21 0837   Offloading for Diabetic Foot Ulcers Yes (type) 03/05/21 2034   Wound Length (cm) 3.4 cm 03/25/21 1031   Wound Width (cm) 3.8 cm 03/25/21 1031   Wound Depth (cm) 0.2 cm 03/25/21 1031   Wound Surface Area (cm^2) 12.92 cm^2 03/25/21 1031   Change in Wound Size % (l*w) -146.1 03/25/21 1031   Wound Volume (cm^3) 2.58 cm^3 03/25/21 1031   Wound Healing % -396 03/25/21 1031   Post-Procedure Length (cm) 3.4 cm 03/25/21 1109   Post-Procedure Width (cm) 3.8 cm 03/25/21 1109   Post-Procedure Depth (cm) 0.2 cm 03/25/21 1109   Post-Procedure Surface Area (cm^2) 12.92 cm^2 03/25/21 1109   Post-Procedure Volume (cm^3) 2.58 cm^3 03/25/21 1109   Wound Assessment Eschar dry;Granulation tissue 03/25/21 1031   Drainage Amount Large 03/25/21 1031   Drainage Description Green;Serosanguinous 03/25/21 1031   Odor Mild 03/25/21 1031   Sangeetha-wound Assessment Intact;Fragile 03/25/21 1031   Margins Defined edges 03/25/21 1031   Wound Thickness Description not for Pressure Injury Full thickness 03/25/21 1031   Number of days: 28       Wound 03/11/21 Buttocks Left #3  (Active) Response to treatment:  Well tolerated by patient. Plan:     Continue as below. Recheck in 2 weeks. Treatment Note please see attached Discharge Instructions    Written patient dismissal instructions given to patient and signed by patient or POA. Discharge Instructions         Discharge Instructions        [facility]    Physician Orders and Discharge [place]   [address]   General acute hospital, [address] [place] [address]   Telephone: [phone]      [phone]           NAME: Joyce Guardado   DATE OF BIRTH: [birth year][address] [facility] RECORD NUMBER:  [id number]       Home Care/Facility: [facility]      Wound Location:   Coccyx, Left Posterior upper thigh and Left Buttock    Dressing order:  1.Cleanse wound(s) with normal saline. 2. Apply a nickel thickness layer of SANTYL OINTMENT or PLUROGEL to the wound bed for   enzymatic debridement purposes. 3. Apply a moistened saline 4x4 (gauze pad) over the Santyl Ointment only or DSD over Plurogel. 4. Cover with additional dry 4x4's and paper tape. 5. Change Every Day.         Wound Location:  Left Shoulder    Dressing order:  Cleanse with saline and dry. Thin layer of Mupirocin. Cover with Mepilex border or dry gauze. Change daily            Compression:       Offloading Device:   Keep heels elevated off surfaces.  Gel cushion in wheelchair.  Use  Low air-loss mattress for bed. . turn side to side in bed. Up in chair to eat only and try to keep pressure off of wounds.       Other Instructions: Culture in [facility] today      Keep all dressings clean, dry and intact.  Keep pressure off the wound(s) at all times.        Follow up visit  2  Weeks  April 8 , 2021 @         Please give 24 hour notice if unable to keep appointment.  [phone]       If you experience any of the following, please call the Wound Care Service at  [phone] or go to the nearest emergency room.         *Increase in pain         *Temperature over 101           *Increase in drainage from your wound or a foul odor   *Uncontrolled swelling            *Need for compression bandage changes due to slippage, breakthrough drainage         PLEASE NOTE: IF YOU ARE UNABLE TO OBTAIN WOUND SUPPLIES, CONTINUE TO USE THE SUPPLIES YOU HAVE AVAILABLE UNTIL YOU ARE ABLE TO REACH US. IT IS MOST IMPORTANT TO KEEP THE WOUND COVERED AT ALL TIMES   Due to the COVID-19 surge and the mandate to reduce the Wound Center hours to only 2 days per week until further notice, the patient was told that if there is any worsening of the wound(s), he/she should call us immediately to prevent having to go to the Emergency Room.   Electronically signed by WILI Valentine NP on 3/25/2021 at 2:21 PM        Electronically signed by WILI Valentine NP on 3/25/2021 at 2:28 PM

## 2021-03-25 NOTE — DISCHARGE INSTR - COC
Continuity of Care Form    Patient Name: Denver Ash   :  1950  MRN:  [id number]    Admit date:  3/25/2021  Discharge date:  ***    Code Status Order: Prior   Advance Directives:   [facility] Documentation     Date/Time Healthcare Directive Type of Healthcare Directive Copy in [address] Agent's Name Healthcare Agent's Phone Number    21 1015  No, patient does not have an advance directive for healthcare treatment -- -- -- -- --          Admitting Physician:  No admitting provider for patient encounter. PCP: Danielle Love MD    Discharging Nurse: [facility] Unit/Room#: No information available for this encounter. Discharging Unit Phone Number: ***    Emergency Contact:   Extended Emergency Contact Information  Primary Emergency Contact: jason mckeon (nurse)  Home Phone: [phone]  Relation: Other  Secondary Emergency Contact: Erika Henning Director  Work Phone: [phone]  Relation: Other  Preferred language: [address] needed?  No    Past Surgical History:  Past Surgical History:   Procedure Laterality Date    COLONOSCOPY  11    negative    UPPER GASTROINTESTINAL ENDOSCOPY  11    by Dr. Elizabeth Hence N/A 2019    EGD performed by Omega Song MD at [place]       Immunization History:   Immunization History   Administered Date(s) Administered    Hepatitis B Adult (Engerix-B) 2014    Influenza Virus Vaccine 10/20/2010, 10/01/2011, 10/01/2012, 10/18/2013, 10/26/2014, 10/23/2015, 2016    PPD Test 2008, 2009, 2010, 2011, 2012, 2013, 2014, 2015, 2016, 2017    Pneumococcal Conjugate 13-valent (Nzivirk85) 2016    Pneumococcal Conjugate 7-valent (Prevnar7) 2006    Td, unspecified formulation 2006, 2017       Active Problems:  Patient Active Problem List   Diagnosis Code  Disorder of intellectual development F79    Diabetes mellitus due to underlying condition with stage 1 chronic kidney disease, without long-term current use of insulin ([facility]) E08.22, N18.1    Seizure disorder ([facility]) G40.909    GERD (gastroesophageal reflux disease) K21.9    Chronic UTI N39.0    Osteopenia M85.80    Dermatitis L30.9    Eczema L30.9    Hypovitaminosis D E55.9    VANIA (iron deficiency anemia) D50.9    Chronic indwelling Ha catheter Z97.8    Essential hypertension I10    Full code status Z78.9    Dietary restriction Z71.3    Degenerative arthritis of hip M16.9    Neurogenic dysfunction of the urinary bladder N31.9    Foot deformity M21.969    Chronic ulcer of sacral region, with unspecified severity ([facility]) L98.429    Ulcer of perianal area, limited to breakdown of skin ([facility]) L98.491    Pressure injury of heel, stage 1 L89.601    Sacral wound, subsequent encounter S31.000D    Wound, open, hip or thigh with complication W48.331A, C02.100H    UTI (urinary tract infection) N39.0    Uncontrolled type 2 diabetes mellitus with hyperglycemia ([facility]) E11.65       Isolation/Infection:   Isolation          No Isolation        Patient Infection Status     None to display          Nurse Assessment:  Last Vital Signs: /62   Pulse 72   Temp 97 °F (36.1 °C) (Temporal)   Resp 18     Last documented pain score (0-10 scale):    Last Weight:   Wt Readings from Last 1 Encounters:   03/06/21 117 lb 15.1 oz (53.5 kg)     Mental Status:  {IP PT MENTAL STATUS:46956}    IV Access:  { NADIA IV ACCESS:811133765}    Nursing Mobility/ADLs:  Walking   {P DME WHUW:822474878}  Transfer  {[facility] DME EKNX:312408499}  Bathing  {[facility] DME VDKK:476733507}  Dressing  {[facility] DME EBLW:531185433}  Toileting  {[facility] DME TDVC:780159108}  Feeding  {[facility] DME IPOD:550183757}  Med Admin  {[facility] DME JBJO:646723552}  Med Delivery   {[facility] MED Delivery:999194363}    Wound Care Documentation and Therapy:  Wound 09/17/20 Coccyx #1 (Active)   Wound Image   03/25/21 1031   Wound Etiology Pressure Stage  3 03/25/21 1031   Dressing Status New dressing applied;Clean;Dry; Intact 03/06/21 0837   Wound Cleansed Cleansed with saline 03/25/21 1031   Dressing/Treatment Other (comment) 03/06/21 0837   Offloading for Diabetic Foot Ulcers Yes (type) 03/04/21 1210   Wound Length (cm) 2.6 cm 03/25/21 1031   Wound Width (cm) 2 cm 03/25/21 1031   Wound Depth (cm) 1 cm 03/25/21 1031   Wound Surface Area (cm^2) 5.2 cm^2 03/25/21 1031   Change in Wound Size % (l*w) 74 03/25/21 1031   Wound Volume (cm^3) 5.2 cm^3 03/25/21 1031   Wound Healing % -30 03/25/21 1031   Post-Procedure Length (cm) 2.2 cm 03/11/21 1002   Post-Procedure Width (cm) 0.5 cm 03/11/21 1002   Post-Procedure Depth (cm) 1.1 cm 03/11/21 1002   Post-Procedure Surface Area (cm^2) 1.1 cm^2 03/11/21 1002   Post-Procedure Volume (cm^3) 1.21 cm^3 03/11/21 1002   Undermining Starts ___ O'Clock 12 03/11/21 0933   Undermining Ends___ O'Clock 6 03/11/21 0933   Undermining Maxium Distance (cm) 1.1 03/11/21 0933   Wound Assessment Granulation tissue;Slough 03/25/21 1031   Drainage Amount Moderate 03/25/21 1031   Drainage Description Serosanguinous;Green 03/25/21 1031   Odor Mild 03/25/21 1031   Sangeetha-wound Assessment Intact;Fragile 03/25/21 1031   Margins Defined edges 03/25/21 1031   Wound Thickness Description not for Pressure Injury Full thickness 03/25/21 1031   Number of days: 189       Wound 02/25/21 #2 left posterior upper thigh (Active)   Wound Image   03/25/21 1031   Wound Etiology Pressure Unstageable 03/25/21 1031   Dressing Status New dressing applied;Clean;Dry; Intact 03/06/21 0837   Wound Cleansed Cleansed with saline 03/25/21 1031   Dressing/Treatment Other (comment) 03/06/21 0837   Offloading for Diabetic Foot Ulcers Yes (type) 03/05/21 2034   Wound Length (cm) 3.4 cm 03/25/21 1031   Wound Width (cm) 3.8 cm 03/25/21 1031   Wound Depth (cm) 0.2 cm 03/25/21 1031   Wound Surface Area (cm^2) 12.92 cm^2 03/25/21 1031   Change in Wound Size % (l*w) -146.1 03/25/21 1031   Wound Volume (cm^3) 2.58 cm^3 03/25/21 1031   Wound Healing % -396 03/25/21 1031   Post-Procedure Length (cm) 3.4 cm 03/25/21 1109   Post-Procedure Width (cm) 3.8 cm 03/25/21 1109   Post-Procedure Depth (cm) 0.2 cm 03/25/21 1109   Post-Procedure Surface Area (cm^2) 12.92 cm^2 03/25/21 1109   Post-Procedure Volume (cm^3) 2.58 cm^3 03/25/21 1109   Wound Assessment Eschar dry;Granulation tissue 03/25/21 1031   Drainage Amount Large 03/25/21 1031   Drainage Description Green;Serosanguinous 03/25/21 1031   Odor Mild 03/25/21 1031   Sangeetha-wound Assessment Intact;Fragile 03/25/21 1031   Margins Defined edges 03/25/21 1031   Wound Thickness Description not for Pressure Injury Full thickness 03/25/21 1031   Number of days: 28       Wound 03/11/21 Buttocks Left #3  (Active)   Wound Image   03/25/21 1031   Wound Etiology Pressure Stage  3 03/25/21 1031   Wound Cleansed Cleansed with saline 03/25/21 1031   Wound Length (cm) 1.2 cm 03/25/21 1031   Wound Width (cm) 1.1 cm 03/25/21 1031   Wound Depth (cm) 0.2 cm 03/25/21 1031   Wound Surface Area (cm^2) 1.32 cm^2 03/25/21 1031   Change in Wound Size % (l*w) 61.18 03/25/21 1031   Wound Volume (cm^3) 0.26 cm^3 03/25/21 1031   Wound Healing % 24 03/25/21 1031   Wound Assessment Granulation tissue 03/25/21 1031   Drainage Amount Moderate 03/25/21 1031   Drainage Description Green;Serous 03/25/21 1031   Odor Mild 03/25/21 1031   Sangeetha-wound Assessment Intact;Fragile 03/25/21 1031   Margins Defined edges 03/25/21 1031   Wound Thickness Description not for Pressure Injury Full thickness 03/25/21 1031   Number of days: 14       Wound 03/25/21 Shoulder Left #4 (Active)   Wound Image   03/25/21 1031   Wound Etiology Pressure Stage  2 03/25/21 1031   Wound Cleansed Cleansed with saline 03/25/21 1031   Dressing/Treatment Antibacterial ointment;Silicone border 66/63/90 1138   Wound Length (cm) 0.2 cm 03/25/21 1031   Wound Width (cm) 0.3 cm 21 1031   Wound Depth (cm) 0.1 cm 21 1031   Wound Surface Area (cm^2) 0.06 cm^2 21 1031   Wound Volume (cm^3) 0.01 cm^3 21 1031   Wound Assessment Granulation tissue 21 1031   Drainage Amount Scant 21 1031   Drainage Description Serous 21 1031   Odor None 21 1031   Sangeetha-wound Assessment Intact 21 1031   Margins Defined edges 21 1031   Wound Thickness Description not for Pressure Injury Full thickness 21 1031   Number of days: 0        Elimination:  Continence:   · Bowel: {YES / EZ:27845}  · Bladder: {YES / NC:60146}  Urinary Catheter: {Urinary Catheter:786744984}   Colostomy/Ileostomy/Ileal Conduit: {YES / GC:29284}       Date of Last BM: ***  No intake or output data in the 24 hours ending 21 1420  No intake/output data recorded.     Safety Concerns:     [employer] Safety Concerns:367104790}    Impairments/Disabilities:      [employer] Impairments/Disabilities:173983434}    Nutrition Therapy:  Current Nutrition Therapy:   [employer] Diet List:593757566}    Routes of Feeding: {P DME Other Feedings:660052420}  Liquids: {Slp liquid thickness:13457}  Daily Fluid Restriction: {CHP DME Yes amt example:234734645}  Last Modified Barium Swallow with Video (Video Swallowing Test): {Done Not Done TEAK:728285044}    Treatments at the Time of Hospital Discharge:   Respiratory Treatments: ***  Oxygen Therapy:  {Therapy; copd oxygen:48582}  Ventilator:    { CC Vent UHKN:376023348}    Rehab Therapies: {THERAPEUTIC INTERVENTION:7074396789}  Weight Bearing Status/Restrictions: [facility] Weight Bearin}  Other Medical Equipment (for information only, NOT a DME order):  {EQUIPMENT:115602723}  Other Treatments: ***    Patient's personal belongings (please select all that are sent with patient):  {P DME Belongings:619836871}    RN SIGNATURE:  {Esignature:546298080}    CASE MANAGEMENT/SOCIAL WORK SECTION    Inpatient Status Date: *** Readmission Risk Assessment Score:  Readmission Risk              Risk of Unplanned Readmission:        0           Discharging to Facility/ Agency   · Name:   · Address:  · Phone:  · Fax:    Dialysis Facility (if applicable)   · Name:  · Address:  · Dialysis Schedule:  · Phone:  · Fax:    / signature: {Esignature:960844803}    PHYSICIAN SECTION    Prognosis: {Prognosis:4682103754}    Condition at Discharge: [facility] Patient Condition:516096265}    Rehab Potential (if transferring to Rehab): {Prognosis:6706745002}    Recommended Labs or Other Treatments After Discharge: ***    Physician Certification: I certify the above information and transfer of Milan Jackson  is necessary for the continuing treatment of the diagnosis listed and that he requires {Admit to Appropriate Level of Care:49804} for {GREATER/LESS:859741081} 30 days.      Update Admission H&P: {CHP DME Changes in ULIET:510274399}    PHYSICIAN SIGNATURE:  {Esignature:005923361}

## 2021-03-25 NOTE — PLAN OF CARE
Problem: Wound:  Goal: Will show signs of wound healing; wound closure and no evidence of infection  Outcome: Ongoing     Problem: Pressure Ulcer:  Goal: Absence of new pressure ulcer  Outcome: Ongoing     Problem: Pressure Ulcer:  Goal: Signs of wound healing will improve  Outcome: Ongoing

## 2021-03-28 LAB
ANAEROBIC CULTURE: ABNORMAL
GRAM STAIN RESULT: ABNORMAL
ORGANISM: ABNORMAL
ORGANISM: ABNORMAL
WOUND/ABSCESS: ABNORMAL
WOUND/ABSCESS: ABNORMAL

## 2021-04-02 PROBLEM — N39.0 UTI (URINARY TRACT INFECTION): Status: RESOLVED | Noted: 2021-03-03 | Resolved: 2021-04-02

## 2021-04-08 ENCOUNTER — APPOINTMENT (OUTPATIENT)
Dept: INTERVENTIONAL RADIOLOGY/VASCULAR | Age: 71
DRG: 466 | End: 2021-04-08
Payer: MEDICAID

## 2021-04-08 ENCOUNTER — APPOINTMENT (OUTPATIENT)
Dept: GENERAL RADIOLOGY | Age: 71
DRG: 466 | End: 2021-04-08
Payer: MEDICAID

## 2021-04-08 ENCOUNTER — HOSPITAL ENCOUNTER (INPATIENT)
Age: 71
LOS: 4 days | Discharge: NURSING, REHAB OR LONG-TERM CARE | DRG: 466 | End: 2021-04-12
Attending: EMERGENCY MEDICINE | Admitting: INTERNAL MEDICINE
Payer: MEDICAID

## 2021-04-08 ENCOUNTER — HOSPITAL ENCOUNTER (OUTPATIENT)
Dept: WOUND CARE | Age: 71
Discharge: HOME | DRG: 466 | End: 2021-04-08
Payer: MEDICAID

## 2021-04-08 ENCOUNTER — VIRTUAL VISIT (OUTPATIENT)
Dept: INFECTIOUS DISEASES | Age: 71
End: 2021-04-08
Payer: MEDICAID

## 2021-04-08 VITALS
TEMPERATURE: 97.8 F | RESPIRATION RATE: 18 BRPM | DIASTOLIC BLOOD PRESSURE: 64 MMHG | SYSTOLIC BLOOD PRESSURE: 139 MMHG | HEART RATE: 90 BPM

## 2021-04-08 DIAGNOSIS — S71.109D OPEN WOUND OF HIP AND THIGH WITH COMPLICATION, UNSPECIFIED LATERALITY, SUBSEQUENT ENCOUNTER: ICD-10-CM

## 2021-04-08 DIAGNOSIS — F79 DISORDER OF INTELLECTUAL DEVELOPMENT: Primary | ICD-10-CM

## 2021-04-08 DIAGNOSIS — A49.9 POLYMICROBIAL BACTERIAL INFECTION: ICD-10-CM

## 2021-04-08 DIAGNOSIS — E08.22 DIABETES MELLITUS DUE TO UNDERLYING CONDITION WITH STAGE 1 CHRONIC KIDNEY DISEASE, WITHOUT LONG-TERM CURRENT USE OF INSULIN (HCC): ICD-10-CM

## 2021-04-08 DIAGNOSIS — S71.001D OPEN WOUND OF RIGHT HIP AND THIGH WITH COMPLICATION, SUBSEQUENT ENCOUNTER: Primary | ICD-10-CM

## 2021-04-08 DIAGNOSIS — L89.320 DECUBITUS ULCER OF LEFT BUTTOCK, UNSTAGEABLE (HCC): Primary | ICD-10-CM

## 2021-04-08 DIAGNOSIS — T83.511A URINARY TRACT INFECTION ASSOCIATED WITH INDWELLING URETHRAL CATHETER, INITIAL ENCOUNTER (HCC): ICD-10-CM

## 2021-04-08 DIAGNOSIS — S31.000D SACRAL WOUND, SUBSEQUENT ENCOUNTER: ICD-10-CM

## 2021-04-08 DIAGNOSIS — L89.150 PRESSURE INJURY OF SACRAL REGION, UNSTAGEABLE (HCC): ICD-10-CM

## 2021-04-08 DIAGNOSIS — L98.429 CHRONIC ULCER OF SACRAL REGION, WITH UNSPECIFIED SEVERITY (HCC): ICD-10-CM

## 2021-04-08 DIAGNOSIS — S31.829D BUTTOCK WOUND, LEFT, SUBSEQUENT ENCOUNTER: ICD-10-CM

## 2021-04-08 DIAGNOSIS — N18.1 DIABETES MELLITUS DUE TO UNDERLYING CONDITION WITH STAGE 1 CHRONIC KIDNEY DISEASE, WITHOUT LONG-TERM CURRENT USE OF INSULIN (HCC): ICD-10-CM

## 2021-04-08 DIAGNOSIS — S71.009D OPEN WOUND OF HIP AND THIGH WITH COMPLICATION, UNSPECIFIED LATERALITY, SUBSEQUENT ENCOUNTER: ICD-10-CM

## 2021-04-08 DIAGNOSIS — L89.153 PRESSURE INJURY OF SACRAL REGION, STAGE 3 (HCC): ICD-10-CM

## 2021-04-08 DIAGNOSIS — F79 DISORDER OF INTELLECTUAL DEVELOPMENT: ICD-10-CM

## 2021-04-08 DIAGNOSIS — T14.8XXA WOUND INFECTION: ICD-10-CM

## 2021-04-08 DIAGNOSIS — N39.0 CHRONIC UTI: ICD-10-CM

## 2021-04-08 DIAGNOSIS — N39.0 URINARY TRACT INFECTION ASSOCIATED WITH INDWELLING URETHRAL CATHETER, INITIAL ENCOUNTER (HCC): ICD-10-CM

## 2021-04-08 DIAGNOSIS — L08.9 WOUND INFECTION: ICD-10-CM

## 2021-04-08 DIAGNOSIS — S71.101D OPEN WOUND OF RIGHT HIP AND THIGH WITH COMPLICATION, SUBSEQUENT ENCOUNTER: Primary | ICD-10-CM

## 2021-04-08 PROBLEM — L89.159 SACRAL DECUBITUS ULCER: Status: ACTIVE | Noted: 2021-04-08

## 2021-04-08 LAB
ALBUMIN SERPL-MCNC: 3 G/DL (ref 3.5–4.6)
ALP BLD-CCNC: 156 U/L (ref 35–104)
ALT SERPL-CCNC: <5 U/L (ref 0–41)
ANION GAP SERPL CALCULATED.3IONS-SCNC: 8 MEQ/L (ref 9–15)
AST SERPL-CCNC: 15 U/L (ref 0–40)
BACTERIA: ABNORMAL /HPF
BASOPHILS ABSOLUTE: 0.1 K/UL (ref 0–0.2)
BASOPHILS RELATIVE PERCENT: 1.2 %
BILIRUB SERPL-MCNC: <0.2 MG/DL (ref 0.2–0.7)
BILIRUBIN DIRECT: <0.2 MG/DL (ref 0–0.4)
BILIRUBIN URINE: NEGATIVE
BILIRUBIN, INDIRECT: ABNORMAL MG/DL (ref 0–0.6)
BLOOD, URINE: ABNORMAL
BUN BLDV-MCNC: 19 MG/DL (ref 8–23)
C-REACTIVE PROTEIN: 9.4 MG/L (ref 0–5)
CALCIUM SERPL-MCNC: 9.3 MG/DL (ref 8.5–9.9)
CHLORIDE BLD-SCNC: 105 MEQ/L (ref 95–107)
CLARITY: ABNORMAL
CO2: 29 MEQ/L (ref 20–31)
COLOR: YELLOW
CREAT SERPL-MCNC: 0.36 MG/DL (ref 0.7–1.2)
EOSINOPHILS ABSOLUTE: 0 K/UL (ref 0–0.7)
EOSINOPHILS RELATIVE PERCENT: 0.4 %
EPITHELIAL CELLS, UA: ABNORMAL /HPF (ref 0–5)
GFR AFRICAN AMERICAN: >60
GFR NON-AFRICAN AMERICAN: >60
GLUCOSE BLD-MCNC: 216 MG/DL (ref 60–115)
GLUCOSE BLD-MCNC: 242 MG/DL (ref 70–99)
GLUCOSE BLD-MCNC: 299 MG/DL (ref 60–115)
GLUCOSE URINE: NEGATIVE MG/DL
HCT VFR BLD CALC: 35.1 % (ref 42–52)
HEMOGLOBIN: 11.4 G/DL (ref 14–18)
KETONES, URINE: NEGATIVE MG/DL
LACTIC ACID, SEPSIS: 1.2 MMOL/L (ref 0.5–1.9)
LACTIC ACID, SEPSIS: 1.6 MMOL/L (ref 0.5–1.9)
LACTIC ACID: 1.4 MMOL/L (ref 0.5–2.2)
LEUKOCYTE ESTERASE, URINE: ABNORMAL
LYMPHOCYTES ABSOLUTE: 0.5 K/UL (ref 1–4.8)
LYMPHOCYTES RELATIVE PERCENT: 4.9 %
MCH RBC QN AUTO: 29.8 PG (ref 27–31.3)
MCHC RBC AUTO-ENTMCNC: 32.4 % (ref 33–37)
MCV RBC AUTO: 91.8 FL (ref 80–100)
MONOCYTES ABSOLUTE: 0.4 K/UL (ref 0.2–0.8)
MONOCYTES RELATIVE PERCENT: 3.8 %
NEUTROPHILS ABSOLUTE: 10 K/UL (ref 1.4–6.5)
NEUTROPHILS RELATIVE PERCENT: 89.7 %
NITRITE, URINE: POSITIVE
PDW BLD-RTO: 16.7 % (ref 11.5–14.5)
PERFORMED ON: ABNORMAL
PERFORMED ON: ABNORMAL
PH UA: 7 (ref 5–9)
PLATELET # BLD: 364 K/UL (ref 130–400)
POTASSIUM SERPL-SCNC: 3.5 MEQ/L (ref 3.4–4.9)
PROTEIN UA: 100 MG/DL
RBC # BLD: 3.82 M/UL (ref 4.7–6.1)
SARS-COV-2, NAAT: NOT DETECTED
SEDIMENTATION RATE, ERYTHROCYTE: 63 MM (ref 0–20)
SODIUM BLD-SCNC: 142 MEQ/L (ref 135–144)
SPECIFIC GRAVITY UA: 1.02 (ref 1–1.03)
TOTAL PROTEIN: 6.8 G/DL (ref 6.3–8)
URINE REFLEX TO CULTURE: YES
UROBILINOGEN, URINE: 0.2 E.U./DL
WBC # BLD: 11.1 K/UL (ref 4.8–10.8)
WBC UA: >100 /HPF (ref 0–5)

## 2021-04-08 PROCEDURE — 96365 THER/PROPH/DIAG IV INF INIT: CPT

## 2021-04-08 PROCEDURE — 85652 RBC SED RATE AUTOMATED: CPT

## 2021-04-08 PROCEDURE — 83605 ASSAY OF LACTIC ACID: CPT

## 2021-04-08 PROCEDURE — 36415 COLL VENOUS BLD VENIPUNCTURE: CPT

## 2021-04-08 PROCEDURE — 87186 SC STD MICRODIL/AGAR DIL: CPT

## 2021-04-08 PROCEDURE — 99284 EMERGENCY DEPT VISIT MOD MDM: CPT

## 2021-04-08 PROCEDURE — 2580000003 HC RX 258: Performed by: INTERNAL MEDICINE

## 2021-04-08 PROCEDURE — 87635 SARS-COV-2 COVID-19 AMP PRB: CPT

## 2021-04-08 PROCEDURE — 2500000003 HC RX 250 WO HCPCS: Performed by: INTERNAL MEDICINE

## 2021-04-08 PROCEDURE — 11042 DBRDMT SUBQ TIS 1ST 20SQCM/<: CPT

## 2021-04-08 PROCEDURE — 87086 URINE CULTURE/COLONY COUNT: CPT

## 2021-04-08 PROCEDURE — 1210000000 HC MED SURG R&B

## 2021-04-08 PROCEDURE — 99203 OFFICE O/P NEW LOW 30 MIN: CPT | Performed by: INTERNAL MEDICINE

## 2021-04-08 PROCEDURE — 6360000002 HC RX W HCPCS: Performed by: INTERNAL MEDICINE

## 2021-04-08 PROCEDURE — 2709999900 IR PICC WO SQ PORT/PUMP > 5 YEARS

## 2021-04-08 PROCEDURE — 0JB70ZZ EXCISION OF BACK SUBCUTANEOUS TISSUE AND FASCIA, OPEN APPROACH: ICD-10-PCS | Performed by: INTERNAL MEDICINE

## 2021-04-08 PROCEDURE — 81001 URINALYSIS AUTO W/SCOPE: CPT

## 2021-04-08 PROCEDURE — 36573 INSJ PICC RS&I 5 YR+: CPT

## 2021-04-08 PROCEDURE — 6360000002 HC RX W HCPCS: Performed by: EMERGENCY MEDICINE

## 2021-04-08 PROCEDURE — 85025 COMPLETE CBC W/AUTO DIFF WBC: CPT

## 2021-04-08 PROCEDURE — 02HV33Z INSERTION OF INFUSION DEVICE INTO SUPERIOR VENA CAVA, PERCUTANEOUS APPROACH: ICD-10-PCS | Performed by: RADIOLOGY

## 2021-04-08 PROCEDURE — 2580000003 HC RX 258: Performed by: EMERGENCY MEDICINE

## 2021-04-08 PROCEDURE — 99254 IP/OBS CNSLTJ NEW/EST MOD 60: CPT | Performed by: INTERNAL MEDICINE

## 2021-04-08 PROCEDURE — 86140 C-REACTIVE PROTEIN: CPT

## 2021-04-08 PROCEDURE — 80048 BASIC METABOLIC PNL TOTAL CA: CPT

## 2021-04-08 PROCEDURE — 11042 DBRDMT SUBQ TIS 1ST 20SQCM/<: CPT | Performed by: NURSE PRACTITIONER

## 2021-04-08 PROCEDURE — 87077 CULTURE AEROBIC IDENTIFY: CPT

## 2021-04-08 PROCEDURE — 87040 BLOOD CULTURE FOR BACTERIA: CPT

## 2021-04-08 PROCEDURE — 80076 HEPATIC FUNCTION PANEL: CPT

## 2021-04-08 PROCEDURE — 72220 X-RAY EXAM SACRUM TAILBONE: CPT

## 2021-04-08 PROCEDURE — 6370000000 HC RX 637 (ALT 250 FOR IP): Performed by: INTERNAL MEDICINE

## 2021-04-08 RX ORDER — DEXTROSE MONOHYDRATE 25 G/50ML
12.5 INJECTION, SOLUTION INTRAVENOUS PRN
Status: DISCONTINUED | OUTPATIENT
Start: 2021-04-08 | End: 2021-04-12 | Stop reason: HOSPADM

## 2021-04-08 RX ORDER — AMLODIPINE BESYLATE 5 MG/1
5 TABLET ORAL DAILY
Status: DISCONTINUED | OUTPATIENT
Start: 2021-04-08 | End: 2021-04-12 | Stop reason: HOSPADM

## 2021-04-08 RX ORDER — SODIUM CHLORIDE 0.9 % (FLUSH) 0.9 %
5-40 SYRINGE (ML) INJECTION PRN
Status: DISCONTINUED | OUTPATIENT
Start: 2021-04-08 | End: 2021-04-12 | Stop reason: HOSPADM

## 2021-04-08 RX ORDER — PANTOPRAZOLE SODIUM 40 MG/1
40 TABLET, DELAYED RELEASE ORAL
Status: DISCONTINUED | OUTPATIENT
Start: 2021-04-09 | End: 2021-04-12 | Stop reason: HOSPADM

## 2021-04-08 RX ORDER — SODIUM CHLORIDE 9 MG/ML
25 INJECTION, SOLUTION INTRAVENOUS PRN
Status: DISCONTINUED | OUTPATIENT
Start: 2021-04-08 | End: 2021-04-12 | Stop reason: HOSPADM

## 2021-04-08 RX ORDER — ACETAMINOPHEN 650 MG/1
650 SUPPOSITORY RECTAL EVERY 6 HOURS PRN
Status: DISCONTINUED | OUTPATIENT
Start: 2021-04-08 | End: 2021-04-12 | Stop reason: HOSPADM

## 2021-04-08 RX ORDER — SODIUM CHLORIDE 9 MG/ML
250 INJECTION, SOLUTION INTRAVENOUS ONCE
Status: COMPLETED | OUTPATIENT
Start: 2021-04-08 | End: 2021-04-08

## 2021-04-08 RX ORDER — SODIUM CHLORIDE 0.9 % (FLUSH) 0.9 %
5-40 SYRINGE (ML) INJECTION EVERY 12 HOURS SCHEDULED
Status: DISCONTINUED | OUTPATIENT
Start: 2021-04-08 | End: 2021-04-12 | Stop reason: HOSPADM

## 2021-04-08 RX ORDER — DEXTROSE MONOHYDRATE 50 MG/ML
100 INJECTION, SOLUTION INTRAVENOUS PRN
Status: DISCONTINUED | OUTPATIENT
Start: 2021-04-08 | End: 2021-04-12 | Stop reason: HOSPADM

## 2021-04-08 RX ORDER — NICOTINE POLACRILEX 4 MG
15 LOZENGE BUCCAL PRN
Status: DISCONTINUED | OUTPATIENT
Start: 2021-04-08 | End: 2021-04-12 | Stop reason: HOSPADM

## 2021-04-08 RX ORDER — ATORVASTATIN CALCIUM 10 MG/1
10 TABLET, FILM COATED ORAL NIGHTLY
Status: DISCONTINUED | OUTPATIENT
Start: 2021-04-08 | End: 2021-04-12 | Stop reason: HOSPADM

## 2021-04-08 RX ORDER — LIDOCAINE 40 MG/G
CREAM TOPICAL ONCE
Status: DISCONTINUED | OUTPATIENT
Start: 2021-04-08 | End: 2021-04-09 | Stop reason: HOSPADM

## 2021-04-08 RX ORDER — ASPIRIN 81 MG/1
81 TABLET ORAL DAILY
Status: DISCONTINUED | OUTPATIENT
Start: 2021-04-08 | End: 2021-04-12 | Stop reason: HOSPADM

## 2021-04-08 RX ORDER — LISINOPRIL 20 MG/1
40 TABLET ORAL DAILY
Status: DISCONTINUED | OUTPATIENT
Start: 2021-04-08 | End: 2021-04-12 | Stop reason: HOSPADM

## 2021-04-08 RX ORDER — ACETAMINOPHEN 325 MG/1
650 TABLET ORAL EVERY 6 HOURS PRN
Status: DISCONTINUED | OUTPATIENT
Start: 2021-04-08 | End: 2021-04-12 | Stop reason: HOSPADM

## 2021-04-08 RX ORDER — LIDOCAINE HYDROCHLORIDE 20 MG/ML
5 INJECTION, SOLUTION INFILTRATION; PERINEURAL ONCE
Status: COMPLETED | OUTPATIENT
Start: 2021-04-08 | End: 2021-04-08

## 2021-04-08 RX ORDER — FOLIC ACID 1 MG/1
1 TABLET ORAL DAILY
Status: DISCONTINUED | OUTPATIENT
Start: 2021-04-08 | End: 2021-04-12 | Stop reason: HOSPADM

## 2021-04-08 RX ADMIN — PIPERACILLIN AND TAZOBACTAM 3375 MG: 3; .375 INJECTION, POWDER, LYOPHILIZED, FOR SOLUTION INTRAVENOUS at 23:26

## 2021-04-08 RX ADMIN — VANCOMYCIN HYDROCHLORIDE 1000 MG: 1 INJECTION, POWDER, LYOPHILIZED, FOR SOLUTION INTRAVENOUS at 18:20

## 2021-04-08 RX ADMIN — ATORVASTATIN CALCIUM 10 MG: 10 TABLET, FILM COATED ORAL at 23:27

## 2021-04-08 RX ADMIN — LEVETIRACETAM 750 MG: 500 TABLET ORAL at 23:27

## 2021-04-08 RX ADMIN — LIDOCAINE HYDROCHLORIDE 2 ML: 20 INJECTION, SOLUTION INFILTRATION; PERINEURAL at 16:34

## 2021-04-08 RX ADMIN — SODIUM CHLORIDE 250 ML: 9 INJECTION, SOLUTION INTRAVENOUS at 16:33

## 2021-04-08 RX ADMIN — SODIUM CHLORIDE, PRESERVATIVE FREE 10 ML: 5 INJECTION INTRAVENOUS at 23:27

## 2021-04-08 RX ADMIN — PIPERACILLIN AND TAZOBACTAM 3375 MG: 3; .375 INJECTION, POWDER, FOR SOLUTION INTRAVENOUS at 13:00

## 2021-04-08 RX ADMIN — Medication 10 ML: at 23:43

## 2021-04-08 ASSESSMENT — PAIN SCALES - GENERAL: PAINLEVEL_OUTOF10: 0

## 2021-04-08 ASSESSMENT — ENCOUNTER SYMPTOMS
DIARRHEA: 0
NAUSEA: 0

## 2021-04-08 NOTE — PROGRESS NOTES
Completed skin assessment as 2 nd RN with Miguel Young RN.    Electronically signed by Mecca Polk RN on 4/8/2021 at 3:44 PM

## 2021-04-08 NOTE — PLAN OF CARE
Problem: Wound:  Goal: Will show signs of wound healing; wound closure and no evidence of infection  Outcome: Ongoing     Problem: Pressure Ulcer:  Goal: Signs of wound healing will improve  Outcome: Ongoing     Problem: Pressure Ulcer:  Goal: Absence of new pressure ulcer  Outcome: Ongoing

## 2021-04-08 NOTE — CARE COORDINATION
I received a call from Rehana Sousa the worker at [facility] who will call back regarding consent for picc line placement.

## 2021-04-08 NOTE — CARE COORDINATION
I spoke with Cecil Cerna, pt's guardian through [employer] who handles pt's affairs. He gave phone consent for treatment and admission to ER. This was witnessed by a staff RN. I attempted to contact Mr Bari Turner with pt's room number and HIPPA code but received a voice mail. I attempted to contact [facility] at [phone], Mr Bari Turner at [phone]. I called the alternative number at [phone] but again reached a voicemail and left a message for them to call me as soon as possible.

## 2021-04-08 NOTE — PROGRESS NOTES
Laterality Date    COLONOSCOPY  8.19.11    negative    UPPER GASTROINTESTINAL ENDOSCOPY  8.19.11    by Dr. Oswald Prader 11/7/2019    EGD performed by Cyril Red MD at [address] [place] [address] History     Socioeconomic History    Marital status: Single     Spouse name: Not on file    Number of children: Not on file    Years of education: Not on file    Highest education level: Not on file   Occupational History    Not on file   Social Needs    Financial resource strain: Not on file    Food insecurity     Worry: Not on file     Inability: Not on file    Transportation needs     Medical: Not on file     Non-medical: Not on file   Tobacco Use    Smoking status: Never Smoker    Smokeless tobacco: Never Used   Substance and Sexual Activity    Alcohol use: No    Drug use: No    Sexual activity: Never   Lifestyle    Physical activity     Days per week: Not on file     Minutes per session: Not on file    Stress: Not on file   Relationships    Social connections     Talks on phone: Not on file     Gets together: Not on file     Attends Jehov[employer]'s witness service: Not on file     Active member of club or organization: Not on file     Attends meetings of clubs or organizations: Not on file     Relationship status: Not on file    Intimate partner violence     Fear of current or ex partner: Not on file     Emotionally abused: Not on file     Physically abused: Not on file     Forced sexual activity: Not on file   Other Topics Concern    Not on file   Social History Narrative    Not on file     No family history on file.   No Known Allergies  Current Outpatient Medications on File Prior to Visit   Medication Sig Dispense Refill    sertraline (ZOLOFT) 100 MG tablet TAKE ONE (1) TABLET BY MOUTH ONCE (1) DAILY IN THE MORNING (ANTI-DEPRESSANT) 90 tablet 0    loratadine (CLARITIN) 10 MG tablet TAKE ONE (1) TABLET BY MOUTH ONCE (1) DAILY (PRURITIC DERMATITUS) 90 tablet 0    vitamin B-12 (CYANOCOBALAMIN) 1000 MCG tablet Take 1,000 mcg by mouth daily      JANUVIA 100 MG tablet TAKE ONE (1) TABLET BY MOUTH ONCE (1) DAILY 30 tablet 0    loperamide (IMODIUM) 2 MG capsule Take 2 mg by mouth 4 times daily as needed for Diarrhea      Na Sulfate-K Sulfate-Mg Sulf 17.5-3.13-1.6 GM/177ML SOLN As directed 1 Bottle 0    Lancet Device MISC 1 Device by Does not apply route once for 1 dose 1 Device 0    ammonium lactate (LAC-HYDRIN) 12 % lotion Apply topically as needed for Dry Skin Apply topically as needed.  aspirin 81 MG chewable tablet Take 81 mg by mouth daily      folic acid (FOLVITE) 1 MG tablet Take 1 mg by mouth daily      [facility] MISC Manual wheel chair with foot petals  A50.49 1 each 0    Elastic Bandages & Supports (T.E.D. KNEE LENGTH/M-REGULAR) MISC 1 set of MINERVA stockings. Apply daily while upright in wheelchair as needed for dependent edema control.   Pharmacy/ DME may adjust size as appropriate with corrected measurement 1 each 0    amLODIPine (NORVASC) 5 MG tablet Take 5 mg by mouth daily      acetaminophen (TYLENOL) 325 MG tablet Take 325 mg by mouth daily      atorvastatin (LIPITOR) 10 MG tablet Take 10 mg by mouth daily      busPIRone (BUSPAR) 15 MG tablet Take 15 mg by mouth 3 times daily      cloNIDine (CATAPRES) 0.1 MG tablet Take 0.1 mg by mouth 3 times daily      Cranberry 500 MG CAPS Take 500 mg by mouth 2 times daily      ferrous sulfate 325 (65 Fe) MG tablet Take 325 mg by mouth 2 times daily      levETIRAcetam (KEPPRA) 750 MG tablet Take 750 mg by mouth 2 times daily      lisinopril (PRINIVIL;ZESTRIL) 40 MG tablet Take 40 mg by mouth 2 times daily      metFORMIN (GLUCOPHAGE) 500 MG tablet Take 750 mg by mouth 2 times daily (with meals)      Multiple Vitamin (MULTIVITAMINS PO) Take by mouth daily      calcium carbonate (OSCAL) 500 MG TABS tablet Take 500 mg by mouth 2 times daily      pantoprazole (PROTONIX) 40 MG tablet Take 40 mg by mouth daily      pioglitazone (ACTOS) 30 MG tablet Take 45 mg by mouth daily       Pyridoxine HCl (VITAMIN B-6) 50 MG tablet Take 25 mg by mouth daily      Cholecalciferol (VITAMIN D3) 2000 units CAPS Take by mouth Daily with lunch      ondansetron (ZOFRAN) 4 MG tablet Take 4 mg by mouth every 6 hours as needed for Nausea or Vomiting      promethazine (PHENERGAN) 25 MG suppository Place 25 mg rectally every 6 hours as needed for Nausea       No current facility-administered medications on file prior to visit. Review of Systems   Unable to perform ROS: Mental status change   Constitutional: Negative for appetite change and fever. Cardiovascular: Positive for leg swelling. Gastrointestinal: Negative for diarrhea and nausea. Skin: Positive for wound. Psychiatric/Behavioral: Negative for agitation. Physical Exam  Vitals signs reviewed. Constitutional:       General: He is not in acute distress. HENT:      Head: Atraumatic. Nose: No congestion. Eyes:      General: No scleral icterus. Pulmonary:      Effort: Pulmonary effort is normal. No respiratory distress. Abdominal:      General: There is no distension. Musculoskeletal:         General: Swelling present. Skin:     Coloration: Skin is not jaundiced. Findings: No erythema or rash. Neurological:      Mental Status: He is alert. Motor: Weakness (awake, non verbal, does not follow commands, in a wheelchair) present. These photos were obtained at the wound care center on 3/23      Unable to see the wounds during the visit today since there is not much assistance at the group home  Patient is going today to the wound care center      Per wound care center  Wound description noted in wound assessment. The sacral wound is  with some granulation tissue of wound bed noted, still has small amount of slough in wound bed. Left buttock/thigh wound still covered with eschar.  Debrided wound perimeter using scissors and forceps to loosen covering and allow penetration of collagenase, also scored the eschar over central aspect of wound-able to remove dark soft eschar covering over most of the wound. Wound #3  with some granulation tissue present. Left shoulder wound clean, will apply mupirocin topical.   On this date 4/8/2021 I have spent 30 minutes reviewing previous notes, test results and face to face (virtual) with the patient discussing the diagnosis and importance of compliance with the treatment plan as well as documenting on the day of the visit. 3/6/2021  7:56 AM - [facility] Incoming Lab Results From Soft    Component Value Ref Range & Units Status Collected Lab   WBC 9.5  4.8 - 10.8 K/uL Final 03/06/2021  6:55 AM [facility] - [facility] Lab   RBC 3.42Low   4.70 - 6.10 M/uL Final 03/06/2021  6:55 AM [facility] - [facility] Lab   Hemoglobin 10. 0Low   14.0 - 18.0 g/dL Final 03/06/2021  6:55 AM [facility] - [facility] Lab   Hematocrit 31.0Low   42.0 - 52.0 % Final 03/06/2021  6:55 AM [facility] - [facility]   MCV 90.6  80.0 - 100.0 fL Final 03/06/2021  6:55 AM [facility] - [facility]   MCH 29.2  27.0 - 31.3 pg Final 03/06/2021  6:55 AM [facility] - [facility]   MCHC 32.3Low   33.0 - 37.0 % Final 03/06/2021  6:55 AM [facility] - [facility]   RDW 15.3High   11.5 - 14.5 % Final 03/06/2021  6:55 AM [facility] - [facility]   Platelets 765  392 - 400 K/uL Final 03/06/2021  6:55 AM [facility] [place]    3/6/2021  8:10 AM - [facility] Incoming Lab Results From Soft    Component Value Ref Range & Units Status Collected Lab   Sodium 147High   135 - 144 mEq/L Final 03/06/2021  6:55 AM [address] Lab   Potassium reflex Magnesium 3.5  3.4 - 4.9 mEq/L Final 03/06/2021  6:55 AM [facility]   Chloride 111High   95 - 107 mEq/L Final 03/06/2021  6:55 AM [facility]   CO2 27  20 - 31 mEq/L Final 03/06/2021  6:55 AM [facility]   Anion Gap 9  9 - 15 mEq/L Final 03/06/2021  6:55 AM [facility]   Glucose 265High   70 - 99 mg/dL Final 03/06/2021  6:55 AM [address] Lab   BUN 17  8 - 23 mg/dL Final 03/06/2021  6:55 AM [facility] - [facility] [facility] [place] 0.30Low   0.70 - 1.20 mg/dL Final 03/06/2021  6:55 AM  - [facility] Lab   GFR Non- >60.0  >60 Final 03/06/2021  6:55 AM          Benito Villegas, was evaluated through a synchronous (real-time) audio-video encounter. The patient (or guardian if applicable) is aware that this is a billable service. Verbal consent to proceed has been obtained within the past 12 months. The visit was conducted pursuant to the emergency declaration under the [address] [place] [address] authority and the [employer] General Act. Patient identification was verified, and a caregiver was present when appropriate. The patient was located in a state where the provider was credentialed to provide care. An electronic signature was used to authenticate this note.     --Emory Vasquez MD

## 2021-04-08 NOTE — PROGRESS NOTES
Pt arrived to floor at approximately 1530. Skin assessment completed see notes. Pt nonverbal at baseline. Phone call placed to Arian Sims to do admission questions and complete med rec. Message left requesting a phone call back. Will pass on to night shift. Pt passed bedside swallow eval. Is a feed but otherwise swalllowed food without issue.

## 2021-04-08 NOTE — PROGRESS NOTES
Patient seen this a.m. in wound center following virtual visit by Dr. Romario Chaparro. Left hip/ischial wound debrided at that time with all remaining eschar able to be removed. Small amount of slough remaining in wound bed, will follow and serial debride as needed. Following wound center appointment, patient was sent to ED to expedite admission for PICC line and beginning IV antibiotics by Infectious Disease. Will obtain MRI of sacrum to rule out osteomyelitis.  Patient has renal insufficiency so will order without contrast.

## 2021-04-08 NOTE — H&P
Department of Internal Medicine  General Internal Medicine  Attending History and Physical      CHIEF COMPLAINT:  Sacral ulcer    Reason for Admission:  Pseudomonas sacral decubitus ulcer    History Obtained From:  electronic medical record, reason patient could not give history:  lack of cooperation    HISTORY OF PRESENT ILLNESS:      The patient is a 78 y.o. male with significant past medical history of MRDD, HTN, Dm2, neurogenic bladder with indwelling gayle and chronic UTI's, seizures who was sent to the ED at the request of Dr. Erica Contreras for PICC, IV Zosyn and likely placement. Pt has been on PO abx for his sacral pressurs ulcer, but recent cultures came back with proteus and pseudomonas so ID recommended admission for IV abx. In the ED, labs showed elevated CRP and ESR and UA suggestive of UTI, but patient with indwelling gayle, so possibly colonization. Rest of labs and vitals were unremarkable. Pt admitted and PICC ordered to be placed.  Pt is nonverbal at baseline so history obtained through EMR    Past Medical History:        Diagnosis Date    Anemia     Chronic indwelling Gayle catheter     Chronic UTI     Chronic UTI     Dermatitis     DM (diabetes mellitus) ([address]     DM type 2 (diabetes mellitus, type 2) ([address] 10/29/2013    Eczema     Gastric polyp     GERD (gastroesophageal reflux disease)     H/O bone density study 9.2.11    lumbar spine, lt femoral neck osteopenia, rt femoral neck normal    High blood pressure     HTN (hypertension)     Hypokalemia     Hypovitaminosis D     VANIA (iron deficiency anemia)     MR (mental retardation)     Neurogenic dysfunction of the urinary bladder 1/22/2019    Osteopenia     Pressure ulcer of coccygeal region, unstageable ([address] 09/17/2020    Seizure disorder ([address]     Wound of back      Past Surgical History:        Procedure Laterality Date    COLONOSCOPY  8.19.11    negative    UPPER GASTROINTESTINAL ENDOSCOPY  8.19.11    by Dr. Cayden Cool 500 MG tablet, Take 1,000 mg by mouth 2 times daily (with meals)   Multiple Vitamin (MULTIVITAMINS PO), Take by mouth daily  calcium carbonate (OSCAL) 500 MG TABS tablet, Take 500 mg by mouth 2 times daily  pantoprazole (PROTONIX) 40 MG tablet, Take 40 mg by mouth daily  pioglitazone (ACTOS) 30 MG tablet, Take 45 mg by mouth daily   Pyridoxine HCl (VITAMIN B-6) 50 MG tablet, Take 25 mg by mouth daily  Cholecalciferol (VITAMIN D3) 2000 units CAPS, Take by mouth Daily with lunch  ondansetron (ZOFRAN) 4 MG tablet, Take 4 mg by mouth every 6 hours as needed for Nausea or Vomiting  promethazine (PHENERGAN) 25 MG suppository, Place 25 mg rectally every 6 hours as needed for Nausea    Allergies:  Patient has no known allergies. Social History:   Never smoker, no etoh, no drugs    Family History:   History reviewed. No pertinent family history.   REVIEW OF SYSTEMS:  Review of systems not obtained due to patient factors - mental status  PHYSICAL EXAM:    Vitals:  /77   Pulse 88   Temp 98.1 °F (36.7 °C) (Oral)   Resp 18   Wt 118 lb (53.5 kg)   SpO2 94%   BMI 16.93 kg/m²     Constitutional: Lying in bed comfortably, nonverbal at baseline  Head: Normocephalic, atraumatic  Eyes: EOMI, PERRLA  ENT: moist mucous membranes  Neck: neck supple, trachea midline  Lungs: Good inspiratory effort, no wheeze, no rhonchi, no rales  Heart: RRR, normal S1 and S2  GI: Soft, non-distended, non tender, no guarding, no rebound, +BS  MSK: no edema noted  SKIN: stage 3 sacral decubitus ulcer with clean dressing in place       DATA:  CBC:   Lab Results   Component Value Date    WBC 11.1 04/08/2021    RBC 3.82 04/08/2021    RBC 3.63 06/01/2012    HGB 11.4 04/08/2021    HCT 35.1 04/08/2021    MCV 91.8 04/08/2021    MCH 29.8 04/08/2021    MCHC 32.4 04/08/2021    RDW 16.7 04/08/2021     04/08/2021    MPV 10.5 08/15/2013     CMP:    Lab Results   Component Value Date     04/08/2021    K 3.5 04/08/2021    K 3.5 03/06/2021    CL 105 04/08/2021    CO2 29 04/08/2021    BUN 19 04/08/2021    CREATININE 0.36 04/08/2021    GFRAA >60.0 04/08/2021    LABGLOM >60.0 04/08/2021    GLUCOSE 242 04/08/2021    GLUCOSE 89 06/01/2012    PROT 6.8 04/08/2021    LABALBU 3.0 04/08/2021    LABALBU 4.0 06/01/2012    CALCIUM 9.3 04/08/2021    BILITOT <0.2 04/08/2021    ALKPHOS 156 04/08/2021    AST 15 04/08/2021    ALT <5 04/08/2021     ASSESSMENT AND PLAN:      # Pseudomonas sacracl decubitus ulcer  - was on PO abx outpatient  - WCx from 3/25 shows proteus and pseudomonas  - started on IV Zosyn  - sacral xray read pending  - ID consulted  - repeat cultures drawn  - PICC ordered for long term IV abx  - PT/OT  - luca consulted for debridement  - wound care  - daily labs    # HTN, HLD, DM2  - cont home meds  - ISS, holding PO diabetic meds    # Seizures  - cont home meds    # Neurogenic bladder with indwelling gayle  - chronic UTI's  - UA with bacteria, LE, nitrite - UTI vs colonization?  - on zosyn    DVT: lovenox    Disposition: pt with sacral ulcer growing pseudomonas admitted for PICC and IV abx and likely placement. ID and surgery consulted. Wound care, PT/OT. Anticipated length of stay greater than 48 hours.         Jeane Puente,   Internal Medicine

## 2021-04-08 NOTE — ED PROVIDER NOTES
[address] [facility] ED  EMERGENCY MEDICINE     Pt Name: Cayden Gaspar  MRN: [id number]  [place] 1950  Date of evaluation: 4/8/2021  PCP:    Jane Patterson MD  Provider: Liza Briones DO    CHIEF COMPLAINT       Chief Complaint   Patient presents with    Wound Infection     pt was sent to the from ID for PICC line placement and IV antibiotics        HISTORY OF PRESENT ILLNESS    HPI     Patient is a 62-year-old male with history of MRDD, seizure disorder, type 2 diabetes, hypertension, chronic indwelling Ha, and sacral ulcers presenting to the emergency department with worsening of sacral ulcer. Per patient caregiver, he is coming from a group home. He is nonverbal nonambulatory. Per caregiver, she states that he had a appointment with wound care today who stated that the pressure ulcers of the coccyx were getting worse. They state that there was increased purulence and odor coming from the area. The sacral wound was cultured on March 25, 2021. Today, patient was referred to infectious disease as the culture grew Proteus and Pseudomonas. He was started on Augmentin for the Proteus aspect. Patient was sent to the emergency department by infectious disease as the Pseudomonas was only sensitive to Zosyn which requires IV. Patient is at a group home and is unable to receive IV medications there so was sent to the emergency department for admission for IV antibiotics and rule out osteomyelitis as well as placement. Triage notes and Nursing notes were reviewed by myself. Any discrepancies are addressed above.     PAST MEDICAL HISTORY     Past Medical History:   Diagnosis Date    Anemia     Chronic indwelling Ha catheter     Chronic UTI     Chronic UTI     Dermatitis     [facility] [address]     DM type 2 (diabetes mellitus, type 2) ([facility] [address] 10/29/2013    Eczema     Gastric polyp     GERD (gastroesophageal reflux disease)     H/O bone density study 9.2.11    lumbar spine, lt femoral neck osteopenia, rt femoral neck normal    High blood pressure     HTN (hypertension)     Hypokalemia     Hypovitaminosis D     VANIA (iron deficiency anemia)     MR (mental retardation)     Neurogenic dysfunction of the urinary bladder 1/22/2019    Osteopenia     Pressure ulcer of coccygeal region, unstageable ([facility] [address] 09/17/2020    Seizure disorder ([facility] [address]     Wound of back        SURGICAL HISTORY       Past Surgical History:   Procedure Laterality Date    COLONOSCOPY  8.19.11    negative    UPPER GASTROINTESTINAL ENDOSCOPY  8.19.11    by Dr. Tatum Borjas 11/7/2019    EGD performed by Sofia Balbuena MD at [address] [place] [address]       Current Discharge Medication List      CONTINUE these medications which have NOT CHANGED    Details   sertraline (ZOLOFT) 100 MG tablet TAKE ONE (1) TABLET BY MOUTH ONCE (1) DAILY IN THE MORNING (ANTI-DEPRESSANT)  Qty: 90 tablet, Refills: 0    Comments: Needs follow up prior to any more refill      loratadine (CLARITIN) 10 MG tablet TAKE ONE (1) TABLET BY MOUTH ONCE (1) DAILY (PRURITIC DERMATITUS)  Qty: 90 tablet, Refills: 0      vitamin B-12 (CYANOCOBALAMIN) 1000 MCG tablet Take 1,000 mcg by mouth daily      JANUVIA 100 MG tablet TAKE ONE (1) TABLET BY MOUTH ONCE (1) DAILY  Qty: 30 tablet, Refills: 0    Comments: No additional refill until blood work and follow up done      loperamide (IMODIUM) 2 MG capsule Take 2 mg by mouth 4 times daily as needed for Diarrhea      Na Sulfate-K Sulfate-Mg Sulf 17.5-3.13-1.6 GM/177ML SOLN As directed  Qty: 1 Bottle, Refills: 0      Lancet Device MISC 1 Device by Does not apply route once for 1 dose  Qty: 1 Device, Refills: 0      ammonium lactate (LAC-HYDRIN) 12 % lotion Apply topically as needed for Dry Skin Apply topically as needed. aspirin 81 MG chewable tablet Take 81 mg by mouth daily      folic acid (FOLVITE) 1 MG tablet Take 1 mg by mouth daily      Misc.  Devices [facility]) MISC Manual wheel chair with foot petals  A50.49  Qty: 1 each, Refills: 0      Elastic Bandages & Supports (T.E.D. KNEE LENGTH/M-REGULAR) MISC 1 set of MINERVA stockings. Apply daily while upright in wheelchair as needed for dependent edema control. Pharmacy/ DME may adjust size as appropriate with corrected measurement  Qty: 1 each, Refills: 0      amLODIPine (NORVASC) 5 MG tablet Take 5 mg by mouth daily      acetaminophen (TYLENOL) 325 MG tablet Take 325 mg by mouth daily      atorvastatin (LIPITOR) 10 MG tablet Take 10 mg by mouth daily      busPIRone (BUSPAR) 15 MG tablet Take 15 mg by mouth 3 times daily      cloNIDine (CATAPRES) 0.1 MG tablet Take 0.1 mg by mouth 3 times daily      Cranberry 500 MG CAPS Take 500 mg by mouth 2 times daily      ferrous sulfate 325 (65 Fe) MG tablet Take 325 mg by mouth 2 times daily      levETIRAcetam (KEPPRA) 750 MG tablet Take 750 mg by mouth 2 times daily      lisinopril (PRINIVIL;ZESTRIL) 40 MG tablet Take 40 mg by mouth 2 times daily      metFORMIN (GLUCOPHAGE) 500 MG tablet Take 1,000 mg by mouth 2 times daily (with meals)       Multiple Vitamin (MULTIVITAMINS PO) Take by mouth daily      calcium carbonate (OSCAL) 500 MG TABS tablet Take 500 mg by mouth 2 times daily      pantoprazole (PROTONIX) 40 MG tablet Take 40 mg by mouth daily      pioglitazone (ACTOS) 30 MG tablet Take 45 mg by mouth daily       Pyridoxine HCl (VITAMIN B-6) 50 MG tablet Take 25 mg by mouth daily      Cholecalciferol (VITAMIN D3) 2000 units CAPS Take by mouth Daily with lunch      ondansetron (ZOFRAN) 4 MG tablet Take 4 mg by mouth every 6 hours as needed for Nausea or Vomiting      promethazine (PHENERGAN) 25 MG suppository Place 25 mg rectally every 6 hours as needed for Nausea             ALLERGIES     No Known Allergies    FAMILY HISTORY     History reviewed. No pertinent family history.      SOCIAL HISTORY       Social History     Socioeconomic History    Marital status: Single Spouse name: None    Number of children: None    Years of education: None    Highest education level: None   Occupational History    None   Social Needs    Financial resource strain: None    Food insecurity     Worry: None     Inability: None    Transportation needs     Medical: None     Non-medical: None   Tobacco Use    Smoking status: Never Smoker    Smokeless tobacco: Never Used   Substance and Sexual Activity    Alcohol use: No    Drug use: No    Sexual activity: Never   Lifestyle    Physical activity     Days per week: None     Minutes per session: None    Stress: None   Relationships    Social connections     Talks on phone: None     Gets together: None     Attends Orthodoxy service: None     Active member of club or organization: None     Attends meetings of clubs or organizations: None     Relationship status: None    Intimate partner violence     Fear of current or ex partner: None     Emotionally abused: None     Physically abused: None     Forced sexual activity: None   Other Topics Concern    None   Social History Narrative    None       REVIEW OF SYSTEMS     Review of Systems   Unable to perform ROS: Patient nonverbal       Except as noted above the remainder of the review of systems was reviewed and is negative. SCREENINGS        [place] Coma Scale  Eye Opening: Spontaneous  Best Verbal Response: None  Best Motor Response: Obeys commands  [place] Coma Scale Score: 11               PHYSICAL EXAM    (up to 7 for level 4, 8 or more for level 5)     ED Triage Vitals [04/08/21 1131]   BP Temp Temp Source Pulse Resp SpO2 Height Weight   (!) 144/93 97.6 °F (36.4 °C) Temporal 90 16 93 % -- 118 lb (53.5 kg)       Physical Exam  Constitutional:       Appearance: Normal appearance. He is normal weight. He is not ill-appearing or toxic-appearing. HENT:      Head: Normocephalic and atraumatic.       Right Ear: External ear normal.      Left Ear: External ear normal.      Nose: Nose normal. No congestion or rhinorrhea. Mouth/Throat:      Mouth: Mucous membranes are moist.   Eyes:      General:         Right eye: No discharge. Left eye: No discharge. Conjunctiva/sclera: Conjunctivae normal.      Pupils: Pupils are equal, round, and reactive to light. Neck:      Musculoskeletal: Normal range of motion and neck supple. Cardiovascular:      Rate and Rhythm: Normal rate. Heart sounds: No murmur. Pulmonary:      Effort: Pulmonary effort is normal. No respiratory distress. Breath sounds: Normal breath sounds. No wheezing. Chest:      Chest wall: No tenderness. Abdominal:      General: Abdomen is flat. There is no distension. Palpations: Abdomen is soft. Tenderness: There is no abdominal tenderness. Genitourinary:     Comments: Indwelling Ha catheter  Musculoskeletal:         General: No swelling. Right lower leg: No edema. Left lower leg: No edema. Skin:     General: Skin is warm and dry. Capillary Refill: Capillary refill takes less than 2 seconds. Neurological:      General: No focal deficit present. Mental Status: He is alert and oriented to person, place, and time. Psychiatric:         Mood and Affect: Mood normal.         Behavior: Behavior normal.         Thought Content: Thought content normal.         Judgment: Judgment normal.           DIAGNOSTIC RESULTS     EKG:(none if blank)  All EKGs are interpreted by the Emergency Department Physician who either signs or Co-signs this chart in the absence of a cardiologist.        RADIOLOGY: (none if blank)   I directly visualized the following images and reviewed the radiologist interpretations. Interpretation per the Radiologist below, if available at the time of this note:  XR SACRUM COCCYX (MIN 2 VIEWS)    (Results Pending)   No obvious signs of osteomyelitis on the coccyx, this is my interpretation.         LABS:  Labs Reviewed   BASIC METABOLIC PANEL - Abnormal; Notable Patient has a history of MRDD, nonverbal, nonambulatory. Patient has a slight white count of 11.1. He is afebrile in the department. Was sent over by infectious disease for IV antibiotics, Zosyn for sacral ulcer. Patient will be admitted to hospital for further treatment with IV antibiotics and PICC line placement and placement into a skilled nursing facility. Patient was also incidentally found to have a urinary tract infection. Sent out for culture. Covered by Zosyn at this time. Patient was accepted by [facility]. Strict return precautions and follow up instructions were discussed with the patient with which the patient agrees    ED Medications administered this visit:    Medications   piperacillin-tazobactam (ZOSYN) 3,375 mg in dextrose 5 % 50 mL IVPB (mini-bag) (0 mg Intravenous Stopped 4/8/21 1335)         FINAL IMPRESSION      1. Pressure injury of sacral region, unstageable ([address]    2. Wound infection    3. Urinary tract infection associated with indwelling urethral catheter, initial encounter [facility])          DISPOSITION/PLAN   DISPOSITION Admitted 04/08/2021 02:12:59 PM      PATIENT REFERRED TO:  No follow-up provider specified.     DISCHARGE MEDICATIONS:  Current Discharge Medication List                 Gladis Dumont DO (electronically signed)  Attending Physician, Emergency Department         Gladis uDmont DO  04/08/21 7753

## 2021-04-08 NOTE — ED NOTES
Bed: 21  Expected date:   Expected time:   Means of arrival:   Comments:     Holli Lakhani, DEONNA  04/08/21 1123

## 2021-04-08 NOTE — PROGRESS NOTES
Physical Therapy   [facility] P[facility][id number]    NAME: Anish Ram    : 1950 (78 y.o.)  MRN: [id number]    Account: [de-identified]  Gender: male    PT evaluation and treatment orders received. Chart reviewed. PT eval attempted. Patient Unavailable: pt is off unit at special procedure at time of attempt. Will attempt PT evaluation again at earliest convenience.       Electronically signed by Lucho Pinon PT on 21 at 4:14 PM EDT

## 2021-04-08 NOTE — PROGRESS NOTES
Pt arrived to floor via cart, transferred to bed via slide. Pt skin assessment completed by myself and Dee YING. Wounds noted to sacrum and ischium. 3+ pitting edema noted to bilateral feet and 1+ pitting edema noted to lower legs.

## 2021-04-08 NOTE — CONSULTS
Infectious Disease     Patient Name: Mar Jimenez  Date: 4/8/2021  YOB: 1950  Medical Record Number: [id number]      Infected decubital ulcerations  Osteomyelitis pelvis      History of Present Illness:  MRDD diabetes eczema gastroesophageal reflux disease hypertension hypokalemia chronic neurogenic bladder UTI chronic indwelling Ha dermatitis      Patient presents longstanding decubital ulceration  On oral antibiotics        Review of Systems   Unable to perform ROS: Patient nonverbal       Review of Systems: All 14 review of systems negative other than as stated above    Social History     Tobacco Use    Smoking status: Never Smoker    Smokeless tobacco: Never Used   Substance Use Topics    Alcohol use: No    Drug use: No         Past Medical History:   Diagnosis Date    Anemia     Chronic indwelling Ha catheter     Chronic UTI     Chronic UTI     Dermatitis     [facility]) [facility] [address]     DM type 2 (diabetes mellitus, type 2) ([facility] [address] 10/29/2013    Eczema     Gastric polyp     GERD (gastroesophageal reflux disease)     H/O bone density study 9.2.11    lumbar spine, lt femoral neck osteopenia, rt femoral neck normal    High blood pressure     HTN (hypertension)     Hypokalemia     Hypovitaminosis D     VANIA (iron deficiency anemia)     MR (mental retardation)     Neurogenic dysfunction of the urinary bladder 1/22/2019    Osteopenia     Pressure ulcer of coccygeal region, unstageable ([facility] [address] 09/17/2020    Seizure disorder ([facility] [address]     Wound of back            Past Surgical History:   Procedure Laterality Date    COLONOSCOPY  8.19.11    negative    UPPER GASTROINTESTINAL ENDOSCOPY  8.19.11    by Dr. Merle Solis N/A 11/7/2019    EGD performed by Iggy Nick [facility] at [facility]         Current Facility-Administered Medications on File Prior to Encounter   Medication Dose Route Frequency Provider Last Rate Last Admin    lidocaine (LMX) 4 % cream   Topical Once WILI Ruelas - NP         Current Outpatient Medications on File Prior to Encounter   Medication Sig Dispense Refill    sertraline (ZOLOFT) 100 MG tablet TAKE ONE (1) TABLET BY MOUTH ONCE (1) DAILY IN THE MORNING (ANTI-DEPRESSANT) 90 tablet 0    loratadine (CLARITIN) 10 MG tablet TAKE ONE (1) TABLET BY MOUTH ONCE (1) DAILY (PRURITIC DERMATITUS) 90 tablet 0    vitamin B-12 (CYANOCOBALAMIN) 1000 MCG tablet Take 1,000 mcg by mouth daily      JANUVIA 100 MG tablet TAKE ONE (1) TABLET BY MOUTH ONCE (1) DAILY 30 tablet 0    loperamide (IMODIUM) 2 MG capsule Take 2 mg by mouth 4 times daily as needed for Diarrhea      Na Sulfate-K Sulfate-Mg Sulf 17.5-3.13-1.6 GM/177ML SOLN As directed 1 Bottle 0    Lancet Device MISC 1 Device by Does not apply route once for 1 dose 1 Device 0    ammonium lactate (LAC-HYDRIN) 12 % lotion Apply topically as needed for Dry Skin Apply topically as needed.  aspirin 81 MG chewable tablet Take 81 mg by mouth daily      folic acid (FOLVITE) 1 MG tablet Take 1 mg by mouth daily      [facility]. Devices [facility]) MISC Manual wheel chair with foot petals  A50.49 1 each 0    Elastic Bandages & Supports (T.E.D. KNEE LENGTH/M-REGULAR) MISC 1 set of MINERVA stockings. Apply daily while upright in wheelchair as needed for dependent edema control.   Pharmacy/ DME may adjust size as appropriate with corrected measurement 1 each 0    amLODIPine (NORVASC) 5 MG tablet Take 5 mg by mouth daily      acetaminophen (TYLENOL) 325 MG tablet Take 325 mg by mouth daily      atorvastatin (LIPITOR) 10 MG tablet Take 10 mg by mouth daily      busPIRone (BUSPAR) 15 MG tablet Take 15 mg by mouth 3 times daily      cloNIDine (CATAPRES) 0.1 MG tablet Take 0.1 mg by mouth 3 times daily      Cranberry 500 MG CAPS Take 500 mg by mouth 2 times daily      ferrous sulfate 325 (65 Fe) MG tablet Take 325 mg by mouth 2 times daily      levETIRAcetam (KEPPRA) 750 MG tablet Take 750 mg by mouth 2 times daily      lisinopril (PRINIVIL;ZESTRIL) 40 MG tablet Take 40 mg by mouth 2 times daily      metFORMIN (GLUCOPHAGE) 500 MG tablet Take 1,000 mg by mouth 2 times daily (with meals)       Multiple Vitamin (MULTIVITAMINS PO) Take by mouth daily      calcium carbonate (OSCAL) 500 MG TABS tablet Take 500 mg by mouth 2 times daily      pantoprazole (PROTONIX) 40 MG tablet Take 40 mg by mouth daily      pioglitazone (ACTOS) 30 MG tablet Take 45 mg by mouth daily       Pyridoxine HCl (VITAMIN B-6) 50 MG tablet Take 25 mg by mouth daily      Cholecalciferol (VITAMIN D3) 2000 units CAPS Take by mouth Daily with lunch      ondansetron (ZOFRAN) 4 MG tablet Take 4 mg by mouth every 6 hours as needed for Nausea or Vomiting      promethazine (PHENERGAN) 25 MG suppository Place 25 mg rectally every 6 hours as needed for Nausea         No Known Allergies      History reviewed. No pertinent family history. Physical Exam:      Physical Exam   Constitutional: No distress. HENT:   Head: Normocephalic. Eyes: Pupils are equal, round, and reactive to light. Neck: Normal range of motion. No JVD present. No tracheal deviation present. No thyromegaly present. Cardiovascular:   No murmur heard. Pulmonary/Chest: Breath sounds normal. No respiratory distress. He has no wheezes. He has no rales. He exhibits no tenderness. Abdominal: He exhibits no distension and no mass. There is no abdominal tenderness. There is no rebound and no guarding. Musculoskeletal:         General: No tenderness or edema. Lymphadenopathy:     He has no cervical adenopathy. Neurological: He is alert. Skin: Skin is warm. No rash noted. He is not diaphoretic. No erythema. No pallor. Blood pressure 131/77, pulse 88, temperature 98.1 °F (36.7 °C), temperature source Oral, resp. rate 18, weight 118 lb (53.5 kg), SpO2 94 %.       .   Lab Results   Component Value Date    WBC 11.1 (H) 04/08/2021    HGB 11.4 (L) 04/08/2021 HCT 35.1 (L) 04/08/2021    MCV 91.8 04/08/2021     04/08/2021     Lab Results   Component Value Date     04/08/2021    K 3.5 04/08/2021    K 3.5 03/06/2021     04/08/2021    CO2 29 04/08/2021    BUN 19 04/08/2021    CREATININE 0.36 04/08/2021    GLUCOSE 242 04/08/2021    GLUCOSE 89 06/01/2012    CALCIUM 9.3 04/08/2021         Microscopic Urinalysis [[id number]] (Abnormal) Collected: 04/08/21 1200      Updated: 04/08/21 1711      Bacteria, UA MANY /HPF       WBC, UA >100 /HPF       Epithelial Cells, UA 6-10 /HPF              ASSESSMENT:  Patient Active Problem List   Diagnosis    Disorder of intellectual development    Diabetes mellitus due to underlying condition with stage 1 chronic kidney disease, without long-term current use of insulin (HCC)    [facility]    GERD (gastroesophageal reflux disease)    Chronic UTI    Osteopenia    Dermatitis    Eczema    Hypovitaminosis D    VANIA (iron deficiency anemia)    Chronic indwelling Ha catheter    Essential hypertension    Full code status    Dietary restriction    Degenerative arthritis of hip    Neurogenic dysfunction of the urinary bladder    Foot deformity    Chronic ulcer of sacral region, with unspecified severity ([facility])    Ulcer of perianal area, limited to breakdown of skin ([facility])    Pressure injury of heel, stage 1    Sacral wound, subsequent encounter    Wound, open, hip or thigh with complication    Uncontrolled type 2 diabetes mellitus with hyperglycemia ([facility]    Buttock wound, left, subsequent encounter    Sacral decubitus ulcer         PLAN:    Infected decubital ulcerations  Osteomyelitis pelvs      vanco Zosyn  Extended course of therapy 6 to 8 weeks      Cultures

## 2021-04-08 NOTE — PROGRESS NOTES
and,   if inconsistent with clinical signs and symptoms or necessary for   patient management, should be tested with an alternative molecular   assay. Negative results do not preclude SARS-CoV-2 infection and   should not be used as the sole basis for patient management decisions. This test has been authorized by the FDA under an Emergency Use   Authorization (EUA) for use by authorized laboratories. Fact sheet for Healthcare Providers:   [employer]Her.es   Fact sheet for Patients: BuildHer.es     METHODOLOGY: Isothermal Nucleic Acid Amplification       Culture, Blood 2 [[id number]] Collected: 04/08/21 1239   Order Status: Sent Specimen: Blood Updated: 04/08/21 1239   Culture, Blood 1 [[id number]] Collected: 04/08/21 1239   Order Status: Sent Specimen: Blood Updated: 04/08/21 1239       Ht Readings from Last 1 Encounters:   04/08/21 5' 10\" (1.778 m)        Wt Readings from Last 1 Encounters:   04/08/21 118 lb (53.5 kg)       Body mass index is 16.93 kg/m². Estimated Creatinine Clearance: 149 mL/min (A) (based on SCr of 0.36 mg/dL (L)). Goal Trough Level: 15-20 mcg/mL    Assessment/Plan:  Will initiate Vancomycin  1000 mg IV every 8 hours, based on current age, actual body weight of 53.5kg (~18mg/kg/dose), and renal function - CrCl >65mL/min to support Q8 hour dosing interval. Goal trough is 15-20mcg/mL for osteomyelitis, and will be more aggressive with dosing to reach goal trough more rapidly. Timing of trough level will be determined based on culture results, renal function, and clinical response. Trough level ordered prior to the FOURTH total dose of vancomycin, 04/09/21 @ 1745. Thank you for the consult. Will continue to follow.     Louisa Ge PharmD   4/8/2021 5:52 PM

## 2021-04-08 NOTE — PROGRESS NOTES
[facility]                                                   Progress Note and Procedure Note      Jessy Hargrove  MEDICAL RECORD NUMBER:  [id number]  AGE: 78 y.o. GENDER: male  : 1950  EPISODE DATE:  2021    Subjective:     Chief Complaint   Patient presents with    Wound Check     left shoulder,m left buttock,left thigh coccyx         HISTORY of PRESENT ILLNESS HPI     Jessy Hargrove is a 78 y.o. male who presents today for wound/ulcer evaluation. History of Wound Context: recheck on chronic non healing coccyx and left ischium wound. At last appointment ischial wound covered with hard eschar, removed eschar at wound edge sufficient to allow C & S of wound bed, with remaining wound scored to allow penetration of collagenase, since not yet amenable to debridement at that appointment. Culture returned positive for Proteus mirabilis and Pseudomonas aeruginosa. Proteus sensitive to amoxicillin-clavulanate- so began 10 day course of augmentin 600/42.9/5ml- 5 ml. q12 h x 10 days. The Pseudomonas showed resistance to oral antibiotics- so referral made to infectious disease for management of IV antibiotics. Dr. Zina Souza saw patient this a.m. via virtual visit. Recommends sending to ED following this mornings scheduled visit in wound center, to allow PICC line placement, and to facilitate obtaining MRI to r/o osteomyelitis. Patient seen and debrided the now soft ischial eschar, and sent to ER. Report called. Wound/Ulcer Pain Timing/Severity: intermittent  Quality of pain: tender  Severity:  2 / 10   Modifying Factors: Pain worsens with care and subsides following completion of.    Associated Signs/Symptoms: erythema, drainage, odor and pain    Ulcer Identification:  Ulcer Type: pressure  Contributing Factors: diabetes, poor glucose control, chronic pressure, decreased mobility, shear force, malnutrition, incontinence of stool and incontinence of urine    Wound: N/A        PAST MEDICAL HISTORY Diagnosis Date    Anemia     Chronic indwelling Ha catheter     Chronic UTI     Chronic UTI     Dermatitis     DM (diabetes mellitus) ([address]     DM type 2 (diabetes mellitus, type 2) ([address] 10/29/2013    Eczema     Gastric polyp     GERD (gastroesophageal reflux disease)     H/O bone density study 9.2.11    lumbar spine, lt femoral neck osteopenia, rt femoral neck normal    High blood pressure     HTN (hypertension)     Hypokalemia     Hypovitaminosis D     VANIA (iron deficiency anemia)     MR (mental retardation)     Neurogenic dysfunction of the urinary bladder 1/22/2019    Osteopenia     Pressure ulcer of coccygeal region, unstageable ([address] 09/17/2020    Seizure disorder ([address]     Wound of back        PAST SURGICAL HISTORY    Past Surgical History:   Procedure Laterality Date    COLONOSCOPY  8.19.11    negative    UPPER GASTROINTESTINAL ENDOSCOPY  8.19.11    by Dr. Yuki Yeager 11/7/2019    EGD performed by Severa Morrow, MD at [facility] [place]       Problem List:    Patient Active Problem List   Diagnosis    Disorder of intellectual development    Diabetes mellitus due to underlying condition with stage 1 chronic kidney disease, without long-term current use of insulin ([address]    Seizure disorder ([address]    GERD (gastroesophageal reflux disease)    Chronic UTI    Osteopenia    Dermatitis    Eczema    Hypovitaminosis D    VANIA (iron deficiency anemia)    Chronic indwelling Ha catheter    Essential hypertension    Full code status    Dietary restriction    Degenerative arthritis of hip    Neurogenic dysfunction of the urinary bladder    Foot deformity    Chronic ulcer of sacral region, with unspecified severity ([address]    Ulcer of perianal area, limited to breakdown of skin ([address]    Pressure injury of heel, stage 1    Sacral wound, subsequent encounter    Wound, open, hip or thigh with complication    Uncontrolled type 2 diabetes mellitus with hyperglycemia [facility]    Buttock wound, left, subsequent encounter        FAMILY HISTORY    History reviewed. No pertinent family history. SOCIAL HISTORY    Social History     Tobacco Use    Smoking status: Never Smoker    Smokeless tobacco: Never Used   Substance Use Topics    Alcohol use: No    Drug use: No       ALLERGIES    No Known Allergies    MEDICATIONS    Current Outpatient Medications on File Prior to Encounter   Medication Sig Dispense Refill    sertraline (ZOLOFT) 100 MG tablet TAKE ONE (1) TABLET BY MOUTH ONCE (1) DAILY IN THE MORNING (ANTI-DEPRESSANT) 90 tablet 0    loratadine (CLARITIN) 10 MG tablet TAKE ONE (1) TABLET BY MOUTH ONCE (1) DAILY (PRURITIC DERMATITUS) 90 tablet 0    vitamin B-12 (CYANOCOBALAMIN) 1000 MCG tablet Take 1,000 mcg by mouth daily      JANUVIA 100 MG tablet TAKE ONE (1) TABLET BY MOUTH ONCE (1) DAILY 30 tablet 0    loperamide (IMODIUM) 2 MG capsule Take 2 mg by mouth 4 times daily as needed for Diarrhea      Na Sulfate-K Sulfate-Mg Sulf 17.5-3.13-1.6 GM/177ML SOLN As directed 1 Bottle 0    Lancet Device MISC 1 Device by Does not apply route once for 1 dose 1 Device 0    ammonium lactate (LAC-HYDRIN) 12 % lotion Apply topically as needed for Dry Skin Apply topically as needed.  aspirin 81 MG chewable tablet Take 81 mg by mouth daily      folic acid (FOLVITE) 1 MG tablet Take 1 mg by mouth daily      [facility] [place]. Devices [facility]) MISC Manual wheel chair with foot petals  A50.49 1 each 0    Elastic Bandages & Supports (T.E.D. KNEE LENGTH/M-REGULAR) MISC 1 set of MINERVA stockings. Apply daily while upright in wheelchair as needed for dependent edema control.   Pharmacy/ DME may adjust size as appropriate with corrected measurement 1 each 0    amLODIPine (NORVASC) 5 MG tablet Take 5 mg by mouth daily      acetaminophen (TYLENOL) 325 MG tablet Take 325 mg by mouth daily      atorvastatin (LIPITOR) 10 MG tablet Take 10 mg by mouth daily      busPIRone (BUSPAR) 15 MG tablet Take 15 mg by mouth 3 times daily      cloNIDine (CATAPRES) 0.1 MG tablet Take 0.1 mg by mouth 3 times daily      Cranberry 500 MG CAPS Take 500 mg by mouth 2 times daily      ferrous sulfate 325 (65 Fe) MG tablet Take 325 mg by mouth 2 times daily      levETIRAcetam (KEPPRA) 750 MG tablet Take 750 mg by mouth 2 times daily      lisinopril (PRINIVIL;ZESTRIL) 40 MG tablet Take 40 mg by mouth 2 times daily      metFORMIN (GLUCOPHAGE) 500 MG tablet Take 1,000 mg by mouth 2 times daily (with meals)       Multiple Vitamin (MULTIVITAMINS PO) Take by mouth daily      calcium carbonate (OSCAL) 500 MG TABS tablet Take 500 mg by mouth 2 times daily      pantoprazole (PROTONIX) 40 MG tablet Take 40 mg by mouth daily      pioglitazone (ACTOS) 30 MG tablet Take 45 mg by mouth daily       Pyridoxine HCl (VITAMIN B-6) 50 MG tablet Take 25 mg by mouth daily      Cholecalciferol (VITAMIN D3) 2000 units CAPS Take by mouth Daily with lunch      ondansetron (ZOFRAN) 4 MG tablet Take 4 mg by mouth every 6 hours as needed for Nausea or Vomiting      promethazine (PHENERGAN) 25 MG suppository Place 25 mg rectally every 6 hours as needed for Nausea       No current facility-administered medications on file prior to encounter. REVIEW OF SYSTEMS    Pertinent items are noted in HPI. Objective:      /64   Pulse 90   Temp 97.8 °F (36.6 °C) (Temporal)   Resp 18     Wt Readings from Last 3 Encounters:   04/08/21 118 lb (53.5 kg)   03/06/21 117 lb 15.1 oz (53.5 kg)   09/11/20 155 lb (70.3 kg)       PHYSICAL EXAM    Constitutional:   Well nourished and well developed. Appears neat and clean. Patient is alert, oriented x3, and in no apparent distress. Respiratory:  Respiratory effort is easy and symmetric bilaterally. Rate is normal at rest and on room air. Vascular:  Capillary refill is <3 sec to digits bilateral.  Extremities negative for  pitting edema.     Neurological:   Unable to adequately evaluate for due to non-verbal status secondary to MRDD. Dermatological:  Wound description noted in wound assessment. The coccyx wound is improved in dimension with wound bed mostly granular, with small amount soft yellow slough remaining. Ischial wound covered with now softened gray/green eschar- needs debridement. Following debridement, wound bed mostly granular with small area of fibrotic slough remaining. Slight odor. Will need ongoing debridement. Eventual goal once wound beds sufficiently clean, will add wound vac to plan of care. Will obtain MRI today after admission to rule out osteomyelitis. Psychiatric:  Patient is calm, cooperative, and at normal baseline. Appropriate interactions and affect. Assessment:      Problem List Items Addressed This Visit     Disorder of intellectual development - Primary    Diabetes mellitus due to underlying condition with stage 1 chronic kidney disease, without long-term current use of insulin (HCC)    Chronic UTI    Chronic ulcer of sacral region, with unspecified severity ([facility])    Wound, open, hip or thigh with complication    Buttock wound, left, subsequent encounter           Procedure Note  Indications:  Based on my examination of this patient's wound(s)/ulcer(s) today, debridement is required to promote healing and evaluate the wound base. Performed by: WILI Hamilton NP    Consent obtained:  Yes    Time out taken:  Yes    Pain Control:  lidocaine cream topical 4%    Debridement:Excisional Debridement    Using curette, scissors and forceps the wound(s)/ulcer(s) was/were sharply debrided down through and including the removal of epidermis, dermis and subcutaneous tissue.         Devitalized Tissue Debrided:  fibrin, biofilm, slough and necrotic/eschar    Pre Debridement Measurements:  Are located in the Wound/Ulcer Documentation Flow Sheet    Wound/Ulcer #: 1 and 2    Post Debridement Measurements:  Wound/Ulcer Descriptions are Pre Debridement except measurements:    Wound 09/17/20 Coccyx #1 (Active)   Wound Image   04/08/21 1022   Wound Etiology Diabetic Holman 3 04/08/21 1022   Wound Cleansed Cleansed with saline 04/08/21 1022   Dressing/Treatment Dry dressing 04/08/21 1050   Wound Length (cm) 2.5 cm 04/08/21 1022   Wound Width (cm) 2 cm 04/08/21 1022   Wound Depth (cm) 0.8 cm 04/08/21 1022   Wound Surface Area (cm^2) 5 cm^2 04/08/21 1022   Change in Wound Size % (l*w) 75 04/08/21 1022   Wound Volume (cm^3) 4 cm^3 04/08/21 1022   Wound Healing % 0 04/08/21 1022   Post-Procedure Length (cm) 2.2 cm 03/11/21 1002   Post-Procedure Width (cm) 0.5 cm 03/11/21 1002   Post-Procedure Depth (cm) 1.1 cm 03/11/21 1002   Post-Procedure Surface Area (cm^2) 1.1 cm^2 03/11/21 1002   Post-Procedure Volume (cm^3) 1.21 cm^3 03/11/21 1002   Undermining Starts ___ O'Clock 4 04/08/21 1022   Undermining Ends___ O'Clock 12 04/08/21 1022   Undermining Maxium Distance (cm) 0.4 04/08/21 1022   Wound Assessment Granulation tissue;Slough 04/08/21 1022   Drainage Amount Moderate 04/08/21 1022   Drainage Description Serous; Yellow;Serosanguinous 04/08/21 1022   Odor Mild 04/08/21 1022   Sangeetha-wound Assessment Intact 04/08/21 1022   Margins Defined edges 04/08/21 1022   Wound Thickness Description not for Pressure Injury Full thickness 04/08/21 1022   Number of days: 203       Wound 02/25/21 #2 left posterior upper thigh (Active)   Wound Image   04/08/21 1022   Wound Etiology Pressure Unstageable 04/08/21 1022   Wound Cleansed Cleansed with saline 04/08/21 1022   Dressing/Treatment Dry dressing 04/08/21 1050   Wound Length (cm) 3.3 cm 04/08/21 1022   Wound Width (cm) 4.2 cm 04/08/21 1022   Wound Depth (cm) 0.9 cm 04/08/21 1022   Wound Surface Area (cm^2) 13.86 cm^2 04/08/21 1022   Change in Wound Size % (l*w) -164 04/08/21 1022   Wound Volume (cm^3) 12.47 cm^3 04/08/21 1022   Wound Healing % -2298 04/08/21 1022   Post-Procedure Length (cm) 3.3 cm 04/08/21 1034 04/08/21 1022   Wound Surface Area (cm^2) 0 cm^2 04/08/21 1022   Change in Wound Size % (l*w) 100 04/08/21 1022   Wound Volume (cm^3) 0 cm^3 04/08/21 1022   Wound Healing % 100 04/08/21 1022   Wound Assessment Granulation tissue 03/25/21 1031   Drainage Amount Scant 03/25/21 1031   Drainage Description Serous 03/25/21 1031   Odor None 03/25/21 1031   Sangeetha-wound Assessment Intact 03/25/21 1031   Margins Defined edges 03/25/21 1031   Wound Thickness Description not for Pressure Injury Full thickness 03/25/21 1031   Number of days: 14            Percent of Wound/Ulcer Debrided: 100%    Total Surface Area Debrided:  18.86 sq cm     Diabetic/Pressure/Non Pressure Ulcers:  Ulcer: pressure ulcer #1 (coccyx) stage 3,  #2 (upper thigh/ischium) unstageable pressure injury. # 4 pressure injury- (shoulder) stage 2 now healed, wound  #3 (stage 3 pressure injury) buttock      Bleeding:  None    Hemostasis Achieved:  not needed    Procedural Pain:  2  / 10     Post Procedural Pain:  0 / 10     Response to treatment:  Well tolerated by patient. Plan: Will send to ED for admit to facilitate PICC line placement, and transfer to Skilled facility for ongoing IV antibiotic administration. Current group home only able to give once daily IV antibiotics. While admitted will obtain MRI to rule out osteomyelitis. Wound care: while admitted, will follow for further debridement as needed. In meantime, daily dressing changes to coccyx and thigh wounds as below - using Pluragel and DSD over while on floor.              Discharge Instructions            Discharge [facility]    Physician Orders and Discharge [address]   Telephone: [phone]      [phone]           NAME: Amish Eaton   DATE OF BIRTH: [birth year] [facility] RECORD NUMBER:  [id number]       Home Care/Facility: [facility]      Wound Location:   Coccyx, Left Posterior upper thigh and Left Buttock    Dressing order:  1.Cleanse wound(s) with normal saline. 2. Apply a nickel thickness layer of SANTYL OINTMENT or PLUROGEL to the wound bed for   enzymatic debridement purposes. 3. Apply a moistened saline 4x4 (gauze pad) over the Santyl Ointment only or DSD over Plurogel. 4. Cover with additional dry 4x4's and paper tape. 5. Change Every Day.         Compression:       Offloading Device:   Keep heels elevated off surfaces.  Gel cushion in wheelchair.  Use  Low air-loss mattress for bed. . turn side to side in bed.  Up in chair to eat only and try to keep pressure off of wounds.       Other Instructions:       Keep all dressings clean, dry and intact.  Keep pressure off the wound(s) at all times.        Follow up visit  :  To go to ED to get admitted for IV antibiotics per Dr Alesia Donohue  Please give 24 hour notice if unable to keep appointment. [phone]       If you experience any of the following, please call the Wound Care Service at  [phone] or go to the nearest emergency room.         *Increase in pain         *Temperature over 101           *Increase in drainage from your wound or a foul odor   *Uncontrolled swelling            *Need for compression bandage changes due to slippage, breakthrough drainage         PLEASE NOTE: IF YOU ARE UNABLE TO OBTAIN WOUND SUPPLIES, CONTINUE TO USE THE SUPPLIES YOU HAVE AVAILABLE UNTIL YOU ARE ABLE TO [facility]. IT IS MOST IMPORTANT TO KEEP THE WOUND COVERED AT ALL TIMES   Due to the COVID-19 surge and the mandate to reduce the Wound Center hours to only 2 days per week until further notice, the patient was told that if there is any worsening of the wound(s), he/she should call us immediately to prevent having to go to the Emergency Room.   Electronically signed by WILI Mahajan NP on 4/8/2021 at 11:54 AM          Electronically signed by Marcos Cruz APRN - NP on 4/8/2021 at 12:22 PM

## 2021-04-09 ENCOUNTER — APPOINTMENT (OUTPATIENT)
Dept: MRI IMAGING | Age: 71
DRG: 466 | End: 2021-04-09
Payer: MEDICAID

## 2021-04-09 PROBLEM — E43 SEVERE MALNUTRITION (HCC): Status: ACTIVE | Noted: 2021-04-09

## 2021-04-09 LAB
ANION GAP SERPL CALCULATED.3IONS-SCNC: 13 MEQ/L (ref 9–15)
BUN BLDV-MCNC: 19 MG/DL (ref 8–23)
CALCIUM SERPL-MCNC: 8.3 MG/DL (ref 8.5–9.9)
CHLORIDE BLD-SCNC: 103 MEQ/L (ref 95–107)
CO2: 25 MEQ/L (ref 20–31)
CREAT SERPL-MCNC: 0.4 MG/DL (ref 0.7–1.2)
GFR AFRICAN AMERICAN: >60
GFR NON-AFRICAN AMERICAN: >60
GLUCOSE BLD-MCNC: 192 MG/DL (ref 60–115)
GLUCOSE BLD-MCNC: 203 MG/DL (ref 70–99)
GLUCOSE BLD-MCNC: 247 MG/DL (ref 60–115)
GLUCOSE BLD-MCNC: 329 MG/DL (ref 60–115)
HCT VFR BLD CALC: 29.6 % (ref 42–52)
HEMOGLOBIN: 9.9 G/DL (ref 14–18)
MAGNESIUM: 1.2 MG/DL (ref 1.7–2.4)
MCH RBC QN AUTO: 30.2 PG (ref 27–31.3)
MCHC RBC AUTO-ENTMCNC: 33.3 % (ref 33–37)
MCV RBC AUTO: 90.8 FL (ref 80–100)
PDW BLD-RTO: 16.4 % (ref 11.5–14.5)
PERFORMED ON: ABNORMAL
PLATELET # BLD: 323 K/UL (ref 130–400)
POTASSIUM REFLEX MAGNESIUM: 3.2 MEQ/L (ref 3.4–4.9)
RBC # BLD: 3.26 M/UL (ref 4.7–6.1)
SODIUM BLD-SCNC: 141 MEQ/L (ref 135–144)
WBC # BLD: 10 K/UL (ref 4.8–10.8)

## 2021-04-09 PROCEDURE — 85027 COMPLETE CBC AUTOMATED: CPT

## 2021-04-09 PROCEDURE — 36415 COLL VENOUS BLD VENIPUNCTURE: CPT

## 2021-04-09 PROCEDURE — 99232 SBSQ HOSP IP/OBS MODERATE 35: CPT | Performed by: INTERNAL MEDICINE

## 2021-04-09 PROCEDURE — 6360000002 HC RX W HCPCS: Performed by: INTERNAL MEDICINE

## 2021-04-09 PROCEDURE — 36592 COLLECT BLOOD FROM PICC: CPT

## 2021-04-09 PROCEDURE — 83735 ASSAY OF MAGNESIUM: CPT

## 2021-04-09 PROCEDURE — 92610 EVALUATE SWALLOWING FUNCTION: CPT

## 2021-04-09 PROCEDURE — 2700000000 HC OXYGEN THERAPY PER DAY

## 2021-04-09 PROCEDURE — 6370000000 HC RX 637 (ALT 250 FOR IP): Performed by: INTERNAL MEDICINE

## 2021-04-09 PROCEDURE — 2580000003 HC RX 258: Performed by: INTERNAL MEDICINE

## 2021-04-09 PROCEDURE — 1210000000 HC MED SURG R&B

## 2021-04-09 PROCEDURE — 97165 OT EVAL LOW COMPLEX 30 MIN: CPT

## 2021-04-09 PROCEDURE — 51798 US URINE CAPACITY MEASURE: CPT

## 2021-04-09 PROCEDURE — 80048 BASIC METABOLIC PNL TOTAL CA: CPT

## 2021-04-09 PROCEDURE — 97161 PT EVAL LOW COMPLEX 20 MIN: CPT

## 2021-04-09 RX ORDER — CLONIDINE HYDROCHLORIDE 0.1 MG/1
0.1 TABLET ORAL 3 TIMES DAILY
Status: DISCONTINUED | OUTPATIENT
Start: 2021-04-09 | End: 2021-04-12 | Stop reason: HOSPADM

## 2021-04-09 RX ORDER — DOCUSATE SODIUM 100 MG/1
100 CAPSULE, LIQUID FILLED ORAL PRN
COMMUNITY

## 2021-04-09 RX ADMIN — FOLIC ACID 1 MG: 1 TABLET ORAL at 09:46

## 2021-04-09 RX ADMIN — VANCOMYCIN HYDROCHLORIDE 1000 MG: 1 INJECTION, POWDER, LYOPHILIZED, FOR SOLUTION INTRAVENOUS at 10:00

## 2021-04-09 RX ADMIN — LISINOPRIL 40 MG: 20 TABLET ORAL at 09:46

## 2021-04-09 RX ADMIN — PIPERACILLIN AND TAZOBACTAM 3375 MG: 3; .375 INJECTION, POWDER, LYOPHILIZED, FOR SOLUTION INTRAVENOUS at 17:21

## 2021-04-09 RX ADMIN — INSULIN LISPRO 4 UNITS: 100 INJECTION, SOLUTION INTRAVENOUS; SUBCUTANEOUS at 17:07

## 2021-04-09 RX ADMIN — LEVETIRACETAM 750 MG: 500 TABLET ORAL at 09:47

## 2021-04-09 RX ADMIN — VANCOMYCIN HYDROCHLORIDE 1000 MG: 1 INJECTION, POWDER, LYOPHILIZED, FOR SOLUTION INTRAVENOUS at 03:39

## 2021-04-09 RX ADMIN — INSULIN LISPRO 4 UNITS: 100 INJECTION, SOLUTION INTRAVENOUS; SUBCUTANEOUS at 12:22

## 2021-04-09 RX ADMIN — ASPIRIN 81 MG: 81 TABLET, COATED ORAL at 10:00

## 2021-04-09 RX ADMIN — SODIUM CHLORIDE, PRESERVATIVE FREE 10 ML: 5 INJECTION INTRAVENOUS at 10:11

## 2021-04-09 RX ADMIN — AMLODIPINE BESYLATE 5 MG: 5 TABLET ORAL at 09:47

## 2021-04-09 RX ADMIN — PANTOPRAZOLE SODIUM 40 MG: 40 TABLET, DELAYED RELEASE ORAL at 05:04

## 2021-04-09 RX ADMIN — SERTRALINE HYDROCHLORIDE 100 MG: 50 TABLET ORAL at 09:46

## 2021-04-09 RX ADMIN — ENOXAPARIN SODIUM 40 MG: 40 INJECTION SUBCUTANEOUS at 09:47

## 2021-04-09 RX ADMIN — INSULIN LISPRO 1 UNITS: 100 INJECTION, SOLUTION INTRAVENOUS; SUBCUTANEOUS at 09:48

## 2021-04-09 RX ADMIN — VANCOMYCIN HYDROCHLORIDE 1000 MG: 1 INJECTION, POWDER, LYOPHILIZED, FOR SOLUTION INTRAVENOUS at 18:45

## 2021-04-09 RX ADMIN — PIPERACILLIN AND TAZOBACTAM 3375 MG: 3; .375 INJECTION, POWDER, LYOPHILIZED, FOR SOLUTION INTRAVENOUS at 10:01

## 2021-04-09 ASSESSMENT — PAIN SCALES - WONG BAKER: WONGBAKER_NUMERICALRESPONSE: 0

## 2021-04-09 NOTE — PROGRESS NOTES
Department of Internal Medicine  General Internal Medicine  Attending Progress Note      SUBJECTIVE:  Pt seen and examined.  Nonverbal. No issues per nursing    OBJECTIVE      Medications    Current Facility-Administered Medications: sodium chloride flush 0.9 % injection 5-40 mL, 5-40 mL, Intravenous, 2 times per day  sodium chloride flush 0.9 % injection 5-40 mL, 5-40 mL, Intravenous, PRN  0.9 % sodium chloride infusion, 25 mL, Intravenous, PRN  enoxaparin (LOVENOX) injection 40 mg, 40 mg, Subcutaneous, Daily  acetaminophen (TYLENOL) tablet 650 mg, 650 mg, Oral, Q6H PRN **OR** acetaminophen (TYLENOL) suppository 650 mg, 650 mg, Rectal, Q6H PRN  piperacillin-tazobactam (ZOSYN) 3,375 mg in dextrose 5 % 50 mL IVPB extended infusion (mini-bag), 3,375 mg, Intravenous, Q8H  sodium chloride flush 0.9 % injection 5-40 mL, 5-40 mL, Intravenous, 2 times per day  sodium chloride flush 0.9 % injection 5-40 mL, 5-40 mL, Intravenous, PRN  0.9 % sodium chloride infusion, 25 mL, Intravenous, PRN  insulin lispro (HUMALOG) injection vial 0-6 Units, 0-6 Units, Subcutaneous, TID WC  insulin lispro (HUMALOG) injection vial 0-3 Units, 0-3 Units, Subcutaneous, Nightly  glucose (GLUTOSE) 40 % oral gel 15 g, 15 g, Oral, PRN  dextrose 50 % IV solution, 12.5 g, Intravenous, PRN  glucagon (rDNA) injection 1 mg, 1 mg, Intramuscular, PRN  dextrose 5 % solution, 100 mL/hr, Intravenous, PRN  sertraline (ZOLOFT) tablet 100 mg, 100 mg, Oral, Daily  aspirin EC tablet 81 mg, 81 mg, Oral, Daily  folic acid (FOLVITE) tablet 1 mg, 1 mg, Oral, Daily  amLODIPine (NORVASC) tablet 5 mg, 5 mg, Oral, Daily  atorvastatin (LIPITOR) tablet 10 mg, 10 mg, Oral, Nightly  levETIRAcetam (KEPPRA) tablet 750 mg, 750 mg, Oral, BID  pantoprazole (PROTONIX) tablet 40 mg, 40 mg, Oral, QAM AC  lisinopril (PRINIVIL;ZESTRIL) tablet 40 mg, 40 mg, Oral, Daily  vancomycin 1000 mg IVPB in 250 mL D5W addavial, 1,000 mg, Intravenous, Q8H  vancomycin (VANCOCIN) intermittent dosing (placeholder), , Other, RX Placeholder  Physical    VITALS:  BP (!) 145/87   Pulse 95   Temp 98.1 °F (36.7 °C)   Resp 17   Ht 5' 10\" (1.778 m)   Wt 118 lb (53.5 kg)   SpO2 99%   BMI 16.93 kg/m²   Constitutional: Lying in bed comfortably, nonverbal at baseline  Head: Normocephalic, atraumatic  Eyes: EOMI, PERRLA  ENT: moist mucous membranes  Neck: neck supple, trachea midline  Lungs: Good inspiratory effort, no wheeze, no rhonchi, no rales  Heart: RRR, normal S1 and S2  GI: Soft, non-distended, non tender, no guarding, no rebound, +BS  MSK: no edema noted  SKIN: stage 3 sacral decubitus ulcer with clean dressing in place  Data    CBC:   Lab Results   Component Value Date    WBC 10.0 04/09/2021    RBC 3.26 04/09/2021    RBC 3.63 06/01/2012    HGB 9.9 04/09/2021    HCT 29.6 04/09/2021    MCV 90.8 04/09/2021    MCH 30.2 04/09/2021    MCHC 33.3 04/09/2021    RDW 16.4 04/09/2021     04/09/2021    MPV 10.5 08/15/2013     CMP:    Lab Results   Component Value Date     04/09/2021    K 3.2 04/09/2021     04/09/2021    CO2 25 04/09/2021    BUN 19 04/09/2021    CREATININE 0.40 04/09/2021    GFRAA >60.0 04/09/2021    LABGLOM >60.0 04/09/2021    GLUCOSE 203 04/09/2021    GLUCOSE 89 06/01/2012    PROT 6.8 04/08/2021    LABALBU 3.0 04/08/2021    LABALBU 4.0 06/01/2012    CALCIUM 8.3 04/09/2021    BILITOT <0.2 04/08/2021    ALKPHOS 156 04/08/2021    AST 15 04/08/2021    ALT <5 04/08/2021       ASSESSMENT AND PLAN      # Pseudomonas sacracl decubitus ulcer  - was on PO abx outpatient  - WCx from 3/25 shows proteus and pseudomonas  - started on IV Zosyn, vanco  - sacral xray read pending  - ID consulted  - repeat cultures drawn  - PICC ordered for long term IV abx  - PT/OT  - luca consulted for debridement  - wound care  - daily labs     # HTN, HLD, DM2  - cont home meds  - ISS, holding PO diabetic meds     # Seizures  - cont home meds     # Neurogenic bladder with indwelling agyle  - chronic UTI's  - UA with bacteria, LE, nitrite - UTI vs colonization?  - on zosyn     DVT: lovenox     Disposition: pt with sacral ulcer growing pseudomonas admitted for PICC and IV abx and likely placement. ID and surgery consulted. Wound care, PT/OT. Will need placement for IV abx.       Bonnie Infante,   Internal Medicine

## 2021-04-09 NOTE — PROGRESS NOTES
Comprehensive Nutrition Assessment    Type and Reason for Visit:  Initial(dx : sacral decubitis ulcer)    Nutrition Recommendations/Plan:   Continue with Dental Soft diet, CC4 until assess by SLP. Assit feed at meals  Continue with Diabetic ONS x3.   Add Wound healing supplement ( Rubin) B & D. Recommend daily MVI with minerals to support wound healing    Nutrition Assessment:  Pt admitted with severe malnutrition related to chronic disease ( DM, MRDD), significant losses of adipose and muscle visible    Malnutrition Assessment:  Malnutrition Status:  Severe malnutrition    Context:  Chronic Illness     Findings of the 6 clinical characteristics of malnutrition:  Energy Intake:  Unable to assess  Weight Loss:  No significant weight loss     Body Fat Loss:  7 - Severe body fat loss Triceps, Fat Overlying Ribs   Muscle Mass Loss:  7 - Severe muscle mass loss Temples (temporalis), Clavicles (pectoralis & deltoids), Thigh (quadraceps)  Fluid Accumulation:  No significant fluid accumulation     Strength:  Not Performed    Estimated Daily Nutrient Needs:  Energy (kcal):  8976-5521 kcals @ 30-35 kcal/ kg; Weight Used for Energy Requirements:  Current(54 kg)     Protein (g):  ~ 108 g protein @ 2 g/ kg BW; Weight Used for Protein Requirements:  Current(54 kg)        Fluid (ml/day):  ~1800; Method Used for Fluid Requirements:  1 ml/kcal      Nutrition Related Findings:  Nonverbal @ base line, wheelchair ambulation,assist feed ate > 75% per nursing, unable to determine acceptance of ONS ( ordered after B) .  Has speech eval ordered, Glucose poorly controlled, Anticipate actual BMI much lower, currently with 3+ BLE edema per nursing, Hx of MRDD, HTN, Dm2,, seizures , chronic sacral pressurs ulcer      Wounds:  Multiple, Pressure Injury, Stage III, Unstageable, Wound Consult Pending(See wound care flowsheet)       Current Nutrition Therapies:    DIET CARB CONTROL; Carb Control: 4 carb choices (60 gms)/meal; Dysphagia Soft and Bite-Sized  Dietary Nutrition Supplements: Diabetic Oral Supplement  Intake > 75% per EMR, assist feed, unable to determine acceptance of ONS    Anthropometric Measures:  · Height: 5' 10\" (177.8 cm)  · Current Body Weight: 121 lb (54.9 kg)(* edema present)   · Admission Body Weight: 118 lb (53.5 kg)    · Usual Body Weight: 117 lb (53.1 kg)(3/3/21. Limted acutal wt in EMR)     · Ideal Body Weight: 166 lbs; % Ideal Body Weight   73%  · BMI: 17.4  · BMI Categories: Underweight (BMI less than 22) age over 72       Nutrition Diagnosis:   · Severe malnutrition, In context of chronic illness related to increase demand for energy/nutrients, endocrine dysfuntion as evidenced by severe muscle loss, severe loss of subcutaneous fat, BMI    · Increased nutrient needs related to increase demand for energy/nutrients as evidenced by wounds    Nutrition Interventions:   Food and/or Nutrient Delivery:  Continue Current Diet, Modify Oral Nutrition Supplement(Continue with Dental Soft diet, until assess by SLP. Continue with Diabetic ONS x3. Add Wound healing supplement ( Rubin) B & D.  Recommend daily MVI with minerals to support wound healing)  Nutrition Education/Counseling:  Education not indicated   Coordination of Nutrition Care:  No recommendation at this time, Continue to monitor while inpatient    Goals:  po > 75% meals and supplements to support wound helaing, gluc < 200, anticipated weight loss as edema resovles       Nutrition Monitoring and Evaluation:     Food/Nutrient Intake Outcomes:  Food and Nutrient Intake, Supplement Intake  Physical Signs/Symptoms Outcomes:  Skin, Weight, Meal Time Behavior, Biochemical Data     Discharge Planning:    Continue Oral Nutrition Supplement     Electronically signed by Messi Damon RD, LD on 4/9/21 at 11:19 AM EDT

## 2021-04-09 NOTE — DISCHARGE INSTR - COC
Continuity of Care Form    Patient Name: Peter Gilmore   :  1950  MRN:  [id number]    Admit date:  2021  Discharge date: 21      Code Status Order: Full Code   Advance Directives:      Admitting Physician:  Ruth Cade DO  PCP: Bere Miller MD    Discharging Nurse: Angel Steel RN  [address] Unit/Room#: P812/I454-63  Discharging Unit Phone Number[de-identified] [phone]      Emergency Contact:   Extended Emergency Contact Information  Primary Emergency Contact: Celena Usha (nurse)  Home Phone: [phone]  Relation: Other  Secondary Emergency Contact: Jono Wyman  Home Phone: [phone]  Relation: Other  Preferred language: Cisco Sanders  Home Phone: [phone]  Relation: Legal Guardian    Past Surgical History:  Past Surgical History:   Procedure Laterality Date    COLONOSCOPY  11    negative    UPPER GASTROINTESTINAL ENDOSCOPY  11    by Dr. Anup Vogel N/A 2019    [facility] performed by Alma Quinteros [facility] at [facility]       Immunization History:   Immunization History   Administered Date(s) Administered    Hepatitis B Adult (Engerix-B) 2014    Influenza Virus Vaccine 10/20/2010, 10/01/2011, 10/01/2012, 10/18/2013, 10/26/2014, 10/23/2015, 2016    PPD Test 2008, 2009, 2010, 2011, 2012, 2013, 2014, 2015, 2016, 2017    Pneumococcal Conjugate 13-valent (Qmifdnd90) 2016    Pneumococcal Conjugate 7-valent (Noralee Bees) 2006    Td, unspecified formulation 2006, 2017       Active Problems:  Patient Active Problem List   Diagnosis Code    Disorder of intellectual development F79    Diabetes mellitus due to underlying condition with stage 1 chronic kidney disease, without long-term current use of insulin (HCC) E08.22, N18.1    Seizure disorder ([address] G40.909    GERD (gastroesophageal reflux disease) K21.9    Chronic UTI N39.0  Osteopenia M85.80    Dermatitis L30.9    Eczema L30.9    Hypovitaminosis D E55.9    VANIA (iron deficiency anemia) D50.9    Chronic indwelling Ha catheter Z97.8    Essential hypertension I10    Full code status Z78.9    Dietary restriction Z71.3    Degenerative arthritis of hip M16.9    Neurogenic dysfunction of the urinary bladder N31.9    Foot deformity M21.969    Chronic ulcer of sacral region, with unspecified severity ([facility]) L98.429    Ulcer of perianal area, limited to breakdown of skin ([facility]) L98.491    Pressure injury of heel, stage 1 L89.601    Sacral wound, subsequent encounter S31.000D    Wound, open, hip or thigh with complication O85.193P, I44.061D    Uncontrolled type 2 diabetes mellitus with hyperglycemia ([facility]) E11.65    Buttock wound, left, subsequent encounter S31.829D    Sacral decubitus ulcer L89.159    Severe malnutrition ([facility]) E43       Isolation/Infection:   Isolation            No Isolation          Patient Infection Status       None to display            Nurse Assessment:  Last Vital Signs: BP (!) 145/87   Pulse 95   Temp 98.1 °F (36.7 °C)   Resp 17   Ht 5' 10\" (1.778 m)   Wt 118 lb (53.5 kg)   SpO2 99%   BMI 16.93 kg/m²     Last documented pain score (0-10 scale): Pain Level: 0  Last Weight:   Wt Readings from Last 1 Encounters:   04/08/21 118 lb (53.5 kg)     Mental Status:   unable to assess/nonverbal    IV Access:  - PICC - site  R Basilic, insertion date: 4/8    Nursing Mobility/ADLs:  Walking   Dependent  Transfer  Dependent  Bathing  Dependent  Dressing  Dependent  Toileting  Dependent  Feeding  Dependent  Med Admin  Dependent  Med Delivery   whole    Wound Care Documentation and Therapy:  Wound 09/17/20 Coccyx #1 (Active)   Wound Image   04/08/21 1022   Wound Etiology Pressure Stage  3 04/08/21 1022   Wound Cleansed Cleansed with saline 04/08/21 2300   Dressing/Treatment Dry dressing 04/08/21 2300   Wound Length (cm) 2.5 cm 04/08/21 1022   Wound Width (cm) 2 cm 04/08/21 1022   Wound Depth (cm) 0.8 cm 04/08/21 1022   Wound Surface Area (cm^2) 5 cm^2 04/08/21 1022   Change in Wound Size % (l*w) 75 04/08/21 1022   Wound Volume (cm^3) 4 cm^3 04/08/21 1022   Wound Healing % 0 04/08/21 1022   Post-Procedure Length (cm) 2.5 cm 04/08/21 1034   Post-Procedure Width (cm) 2 cm 04/08/21 1034   Post-Procedure Depth (cm) 0.8 cm 04/08/21 1034   Post-Procedure Surface Area (cm^2) 5 cm^2 04/08/21 1034   Post-Procedure Volume (cm^3) 4 cm^3 04/08/21 1034   Undermining Starts ___ O'Clock 4 04/08/21 1022   Undermining Ends___ O'Clock 12 04/08/21 1022   Undermining Maxium Distance (cm) 0.4 04/08/21 1022   Wound Assessment Granulation tissue;Slough 04/08/21 1022   Drainage Amount Moderate 04/08/21 1022   Drainage Description Serous; Yellow;Serosanguinous 04/08/21 1022   Odor Mild 04/08/21 1022   Sangeetha-wound Assessment Intact 04/08/21 1022   Margins Defined edges 04/08/21 1022   Wound Thickness Description not for Pressure Injury Full thickness 04/08/21 1022   Number of days: 204       Wound 02/25/21 #2 left posterior upper thigh (Active)   Wound Image   04/08/21 1022   Wound Etiology Pressure Unstageable 04/08/21 1022   Wound Cleansed Cleansed with saline 04/08/21 2300   Dressing/Treatment Dry dressing 04/08/21 2300   Wound Length (cm) 3.3 cm 04/08/21 1022   Wound Width (cm) 4.2 cm 04/08/21 1022   Wound Depth (cm) 0.9 cm 04/08/21 1022   Wound Surface Area (cm^2) 13.86 cm^2 04/08/21 1022   Change in Wound Size % (l*w) -164 04/08/21 1022   Wound Volume (cm^3) 12.47 cm^3 04/08/21 1022   Wound Healing % -2298 04/08/21 1022   Post-Procedure Length (cm) 3.3 cm 04/08/21 1034   Post-Procedure Width (cm) 4.2 cm 04/08/21 1034   Post-Procedure Depth (cm) 0.9 cm 04/08/21 1034   Post-Procedure Surface Area (cm^2) 13.86 cm^2 04/08/21 1034   Post-Procedure Volume (cm^3) 12.47 cm^3 04/08/21 1034   Undermining Starts ___ O'Clock 10 04/08/21 1022   Undermining Ends___ O'Clock 11 04/08/21 1022 sized    Routes of Feeding: Oral  Liquids: Thin Liquids  Daily Fluid Restriction: no  Last Modified Barium Swallow with Video (Video Swallowing Test): not done    Treatments at the Time of Hospital Discharge:   Respiratory Treatments: 0  Oxygen Therapy:  is not on home oxygen therapy. Ventilator:    - No ventilator support    Rehab Therapies: Physical Therapy, Occupational Therapy and Speech/Language Therapy  Weight Bearing Status/Restrictions: No weight bearing restirctions  Other Medical Equipment (for information only, NOT a DME order):  wheelchair and hospital bed  Other Treatments: 0    Patient's personal belongings (please select all that are sent with patient):  None    RN SIGNATURE:  Electronically signed by Evelin Arita RN on 4/12/2021 at 6:11 PM      CASE MANAGEMENT/SOCIAL WORK SECTION    Inpatient Status Date: 4/8/21    Readmission Risk Assessment Score:  Readmission Risk              Risk of Unplanned Readmission:        18           Discharging to Facility/ Agency   · Name: [facility]  · Address:[address] [place] [address]  · Phone:[phone]  · Fax:      / signature: Electronically signed by Chelsea Marin RN on 4/10/21 at 10:27 AM EDT    PHYSICIAN SECTION    Prognosis: {Prognosis:7775299966}    Condition at Discharge: Rashi Giron Patient Condition:890985196}    Rehab Potential (if transferring to Rehab): {Prognosis:5441976466}    Recommended Labs or Other Treatments After Discharge: ***    Physician Certification: I certify the above information and transfer of Carlie Evans  is necessary for the continuing treatment of the diagnosis listed and that he requires {Admit to Appropriate Level of Care:17320} for {GREATER/LESS:089183184} 30 days.      Update Admission H&P: {CHP DME Changes in [facility]:265714694}     Attending Physician Certification:  I certify that I have reviewed the information contained herein, and that the information is a true and accurate reflection of the individual's condition. I certify that the level of care recommended above is required OR that the level of care checked below is required.     _X_Skilled   ___Intermediate   ___Intermediate/MR-DD   ___Protective  Please consider the patient's potential for returning to the community:    Prognosis:  _X_Good   ___Fair   ___Poor   ___Terminal  Convalescent Stay: _X_<30 days   ___31-180 days   ___>180 days    PHYSICIAN SIGNATURE:  Electronically signed by Donna Fitch DO on 4/10/21 at 11:14 AM EDT

## 2021-04-09 NOTE — PROGRESS NOTES
Shift assessment complete. VS are stable. Pt is alert, but unable to assess if he is orientated as pt is nonverbal.  Lungs are clear. Bowel sounds present. Pt has 2 ulcer one on his sacrum and one on his upper left thigh. He had a BM and the dressings were soiled so I put fresh dressing on both. Saline and 4x4s. And a mepilex over his sacrum dressing. Ha is intact. Pt took pills whole in applesauce. [facility] had reached out to his emergency contact regarding his MRI and meds and they never called back. Call light within reach. Will continue to monitor.      Electronically signed by Darya Lopez RN on 4/9/2021 at 12:25 AM

## 2021-04-09 NOTE — PROGRESS NOTES
Spoke to Dr Ml Dolan, radiologist  He stated MRI should not be performed on patient due to unidentifiable wire inside the penis  Please change the order to Nuclear Medicine bone scan  Notified Margaret Ge RN  MRI discontinued

## 2021-04-09 NOTE — PROGRESS NOTES
Physician Progress Note      PATIENT:               Jakob Jc  CSN #:                  [id number]  :                       1950  ADMIT DATE:       2021 11:29 AM  DISCH DATE:  RESPONDING  PROVIDER #:        Radha Neal DO          QUERY TEXT:    Patient admitted with worsening sacral wound with purulent drainage and foul   odor. Per Nursing Flowsheets the patient is noted to have pressure ulcers to   the left posterior upper thigh and left buttock. If possible, please document   in progress notes and discharge summary if the pressure ulcers to the left   posterior upper thigh and left buttock are: The medical record reflects the following:  Risk Factors: DM, chronic indwelling gayle catheter, sacral ulcer  Clinical Indicators:  NN: Left buttock pressure ulcer cleansed with saline   and dry dressing applied, left posterior upper thigh pressure ulcer cleansed   with saline with dry dressing applied  Treatment: Wound consult, wound cleansed w/dry dressing, PUP protocols,   monitoring    If you have any questions, please feel free to contact me at [phone]. Thank you,  Radames Mark RN BSN CDS  Options provided:  -- pressure ulcers to the left posterior upper thigh and left buttock present   on admission  -- pressure ulcers to the left posterior upper thigh and left buttock not   present on admission  -- pressure ulcers to the left posterior upper thigh and left buttock ruled   out  -- Other - I will add my own diagnosis  -- Disagree - Not applicable / Not valid  -- Disagree - Clinically unable to determine / Unknown  -- Refer to Clinical Documentation Reviewer    PROVIDER RESPONSE TEXT:    This patient has a decubitus pressure ulcer to the left posterior upper thigh   and left buttock.     Query created by: Ryanne Geronimo on 2021 9:59 AM      Electronically signed by:  Radha Neal DO 2021 5:06 PM

## 2021-04-09 NOTE — PROGRESS NOTES
Infectious Disease     Patient Name: Nadine Mak  Date: 4/9/2021  YOB: 1950  Medical Record Number: [id number]      Infected decubital ulcerations  Osteomyelitis pelvis          Patient presents longstanding decubital ulceration  On oral antibiotics        Review of Systems   Unable to perform ROS: Patient nonverbal            Physical Exam   Cardiovascular:   No murmur heard. Pulmonary/Chest: Breath sounds normal. No respiratory distress. He has no wheezes. He has no rales. Abdominal: He exhibits no distension and no mass. There is no abdominal tenderness. There is no rebound. Skin:            Blood pressure (!) 145/87, pulse 94, temperature 98 °F (36.7 °C), temperature source Oral, resp. rate 17, height 5' 10\" (1.778 m), weight 118 lb (53.5 kg), SpO2 99 %.       .   Lab Results   Component Value Date    WBC 10.0 04/09/2021    HGB 9.9 (L) 04/09/2021    HCT 29.6 (L) 04/09/2021    MCV 90.8 04/09/2021     04/09/2021     Lab Results   Component Value Date     04/09/2021    K 3.2 04/09/2021     04/09/2021    CO2 25 04/09/2021    BUN 19 04/09/2021    CREATININE 0.40 04/09/2021    GLUCOSE 203 04/09/2021    GLUCOSE 89 06/01/2012    CALCIUM 8.3 04/09/2021         Microscopic Urinalysis [[id number]] (Abnormal) Collected: 04/08/21 1200      Updated: 04/08/21 1711      Bacteria, UA MANY /HPF       WBC, UA >100 /HPF       Epithelial Cells, UA 6-10 /HPF        Culture, Urine [[id number]] (Abnormal) Collected: 04/08/21 1200   Order Status: Completed Specimen: Urine, clean catch Updated: 04/09/21 0946    Organism Pseudomonas aeruginosaAbnormal     Urine Culture, Routine --    50,000 CFU/ml   Sensitivity to follow     Organism Gram negative rodAbnormal     Urine Culture, Routine --    >100,000 CFU/ml   ID and sensitivity to follow            PLAN:    Infected decubital ulcerations  Osteomyelitis pelvs      vanco Zosyn  Extended course of therapy 6 to 8 weeks

## 2021-04-09 NOTE — PROGRESS NOTES
[facility]   Facility/Department: [facility] SURG UNIT  Speech Language Pathology  Clinical Bedside Swallow Evaluation    NAME:Job Doty  : 1950 (79 y.o.)   MRN: [id number]  ROOM: [facility]  ADMISSION DATE: 2021  PATIENT DIAGNOSIS(ES): Sacral decubitus ulcer [L89.159]  Chief Complaint   Patient presents with    Wound Infection     pt was sent to the from ID for PICC line placement and IV antibiotics      Patient Active Problem List    Diagnosis Date Noted    Severe malnutrition ([address] 2021    Sacral decubitus ulcer 2021    Buttock wound, left, subsequent encounter 2021    Uncontrolled type 2 diabetes mellitus with hyperglycemia ([address]     Wound, open, hip or thigh with complication     Sacral wound, subsequent encounter 2020    Pressure injury of heel, stage 1 10/15/2020    Chronic ulcer of sacral region, with unspecified severity ([address] 2020    Ulcer of perianal area, limited to breakdown of skin ([address] 2020    Foot deformity 02/10/2020    Neurogenic dysfunction of the urinary bladder 2019    Degenerative arthritis of hip 10/29/2013    Full code status 2013    Dietary restriction 2013    Osteopenia     Dermatitis     Eczema     Hypovitaminosis D     VANIA (iron deficiency anemia)     Chronic indwelling Ha catheter     Essential hypertension     Diabetes mellitus due to underlying condition with stage 1 chronic kidney disease, without long-term current use of insulin ([address] 10/05/2012    Seizure disorder ([address] 10/05/2012    GERD (gastroesophageal reflux disease) 10/05/2012    Chronic UTI 10/05/2012    Disorder of intellectual development      Past Medical History:   Diagnosis Date    Anemia     Chronic indwelling Ha catheter     Chronic UTI     Chronic UTI     Dermatitis     DM (diabetes mellitus) ([address]     DM type 2 (diabetes mellitus, type 2) ([address] 10/29/2013    Eczema     Gastric polyp     GERD (gastroesophageal reflux disease)     H/O bone density study 9.2.11    lumbar spine, lt femoral neck osteopenia, rt femoral neck normal    High blood pressure     HTN (hypertension)     Hypokalemia     Hypovitaminosis D     VANIA (iron deficiency anemia)     MR (mental retardation)     Neurogenic dysfunction of the urinary bladder 1/22/2019    Osteopenia     Pressure ulcer of coccygeal region, unstageable ([facility] 09/17/2020    Seizure disorder ([facility]     Wound of back      Past Surgical History:   Procedure Laterality Date    COLONOSCOPY  8.19.11    negative    UPPER GASTROINTESTINAL ENDOSCOPY  8.19.11    by Dr. Kemi Santos N/A 11/7/2019    EGD performed by Casimiro Allen MD at [facility]     No Known Allergies    DATE ONSET: 04/08/2021    Date of Evaluation: 4/9/2021   Evaluating Therapist: YOLY Montiel    Recommended Diet and Intervention  Diet Solids Recommendation: Dysphagia Minced and Moist (Dysphagia II)  Liquid Consistency Recommendation: Thin  Recommended Form of Meds: PO  Recommendations: Other (comments)(Meal monitor)  Therapeutic Interventions: Diet tolerance monitoring    Compensatory Swallowing Strategies  Compensatory Swallowing Strategies: Small bites/sips;Upright as possible for all oral intake;Lingual sweep    Reason for Referral  Ricardo Ramírez was referred for a bedside swallow evaluation to assess the efficiency of his swallow function, identify signs and symptoms of aspiration and make recommendations regarding safe dietary consistencies, effective compensatory strategies, and safe eating environment. General  Chart Reviewed: Yes  Subjective  Subjective: Patient was cooperative throughout evaluation.  Patient primarily nonverbal and demonstrated difficulty following instructions in order to complete OME  Behavior/Cognition: Alert  Respiratory Status: Room air  O2 Device: None (Room air)  Communication Observation: Non-verbal  Follows Directions: None  Dentition: Adequate  Patient Positioning: Upright in bed  Baseline Vocal Quality: Normal  Prior Dysphagia History: Patient was previously evaluated at [facility] on 01/23/2018. Results of the BSE indicated oral and pharyngeal dysphagia. It was recommended that patient be o a \"fine chopped\" diet with thin liquids via adaptive cup sips. No follow up therapy was recommended. Consistencies Administered: Dysphagia Soft and Bite-Sized (Dysphagia III); Dysphagia Minced and Moist (Dysphagia II); Dysphagia Pureed (Dysphagia I); Thin    Current Diet level:  Current Diet : Dysphagia Minced and Moist (Dysphagia II)  Current Liquid Diet : Thin    Oral Motor Deficits  Oral/Motor  Oral Motor: (Unable to complete examination due to difficulty following instructions)    Oral Phase Dysfunction  Oral Phase  Oral Phase: Exceptions  Oral Phase Dysfunction  Impaired Mastication: Mechanical soft  Lingual/Palatal Residue: Mechanical soft  Oral Phase  Oral Phase - Comment: Patient presents with mild oral dysphagia characterized by prolonged mastication and lingual residue with soft and bite size consistencies. Patient demonstrating difficulty masticating due to tongue protrusion while masticating. No s/s of aspiration noted with minced and moist diet. Indicators of Pharyngeal Phase Dysfunction   Pharyngeal Phase  Pharyngeal Phase: WFL  Pharyngeal Phase   Pharyngeal: Patient demonstrates adequate pharyngeal phase of the swallow. Impression  Dysphagia Diagnosis: Mild oral stage dysphagia  Dysphagia Impression : Patient presents with mild oral dysphagia characterized by prolonged mastication and lingual residue with soft and bite size consistencies. Patient demonstrating difficulty masticating due to tongue protrusion while masticating. No s/s of aspiration noted with minced and moist diet. Dysphagia Outcome Severity Scale: Level 5: Mild dysphagia- Distant supervision.  May need one diet consistency restricted Treatment Plan  Requires SLP Intervention: Yes  Duration/Frequency of Treatment: 1x meal monitor     Treatment/Goals  Short-term Goals  Timeframe for Short-term Goals: 1-2 weeks  Goal 1: Patient will tolerate recommended diet (minced/moist and thin) without s/s of aspiration in 10/10 trials. Long-term Goals  Timeframe for Long-term Goals: 1-2 weeks for LOS or until goals met  Goal 1: Patient will tolerate least restrictive diet without s/s of aspiration    Prognosis  Prognosis  Prognosis for safe diet advancement: fair  Individuals consulted  Consulted and agree with results and recommendations: Patient;RN(DEONNA Ledesma aware)    Education  Patient Education: Patient educated on results of BSE  Patient Education Response: No evidence of learning;Needs reinforcement  Safety Devices in place: Yes  Type of devices: Bed alarm in place;Call light within reach    Pain Assessment:  Initial Assessment:  Patient denies pain. Re-assessment:  Patient denies pain.     [x]  DEONNA Ledesma informed physician of recommended diet change         Therapy Time  SLP Individual Minutes  Time In: 9711  Time Out: [address]  Minutes: 15              Signature: Electronically signed by YOLY Mustafa on 4/9/2021 at 1:03 PM

## 2021-04-09 NOTE — PROGRESS NOTES
Physical Therapy Med Surg Initial Assessment  Facility/Department: [facility]  Room: [place] [id number]       NAME: Courtney Forde  : 1950 (78 y.o.)  MRN: [id number]  CODE STATUS: Full Code    Date of Service: 2021    Patient Diagnosis(es): Sacral decubitus ulcer [L89.159]   Chief Complaint   Patient presents with    Wound Infection     pt was sent to the from ID for PICC line placement and IV antibiotics      Patient Active Problem List    Diagnosis Date Noted    Severe malnutrition ([address] 2021    Sacral decubitus ulcer 2021    Buttock wound, left, subsequent encounter 2021    Uncontrolled type 2 diabetes mellitus with hyperglycemia ([address]     Wound, open, hip or thigh with complication     Sacral wound, subsequent encounter 2020    Pressure injury of heel, stage 1 10/15/2020    Chronic ulcer of sacral region, with unspecified severity ([address] 2020    Ulcer of perianal area, limited to breakdown of skin ([address] 2020    Foot deformity 02/10/2020    Neurogenic dysfunction of the urinary bladder 2019    Degenerative arthritis of hip 10/29/2013    Full code status 2013    Dietary restriction 2013    Osteopenia     Dermatitis     Eczema     Hypovitaminosis D     VANIA (iron deficiency anemia)     Chronic indwelling Ha catheter     Essential hypertension     Diabetes mellitus due to underlying condition with stage 1 chronic kidney disease, without long-term current use of insulin ([address] 10/05/2012    Seizure disorder ([address] 10/05/2012    GERD (gastroesophageal reflux disease) 10/05/2012    Chronic UTI 10/05/2012    Disorder of intellectual development         Past Medical History:   Diagnosis Date    Anemia     Chronic indwelling Ha catheter     Chronic UTI     Chronic UTI     Dermatitis     DM (diabetes mellitus) ([address]     DM type 2 (diabetes mellitus, type 2) ([address] 10/29/2013    Eczema     Gastric polyp     GERD (gastroesophageal reflux disease)     H/O bone density study 9.2.11    lumbar spine, lt femoral neck osteopenia, rt femoral neck normal    High blood pressure     HTN (hypertension)     Hypokalemia     Hypovitaminosis D     VANIA (iron deficiency anemia)     MR (mental retardation)     Neurogenic dysfunction of the urinary bladder 1/22/2019    Osteopenia     Pressure ulcer of coccygeal region, unstageable [facility] 09/17/2020    Seizure disorder ([facility]     Wound of back      Past Surgical History:   Procedure Laterality Date    COLONOSCOPY  8.19.11    negative    UPPER GASTROINTESTINAL ENDOSCOPY  8.19.11    by Dr. Yasmany Salcido N/A 11/7/2019    EGD performed by Enrico Stearns MD at [facility]     Chart Reviewed: Yes  Patient assessed for rehabilitation services?: Yes  Family / Caregiver Present: No  General Comment  Comments: Pt laying in bed  Restrictions:  Restrictions/Precautions: Fall Risk     SUBJECTIVE: Subjective: Pt non verbal, does not appear to be in acute distress    Pain: Pt is unable to communicate if he is having pain. Pt does not appear to be in any acute distress. Vocalized with attempt to perform PROM to LEs,      Prior Level of Function:  Social/Functional History  Type of Home: (Pt lives in a group home)  Additional Comments: Pt unable to provide PLOF or set up of group home. Pt from clinical assessment appears to be dependent at baseline    OBJECTIVE:   Vision: (unable to formally assess, pt attends to L side of visual field > R side.  follows writer in room inconsistantly)    Cognition: non-verbal    ROM:  RLE General PROM: ankle to neutral, knee lacking 5 degrees extension, flexion to 90 degrees, hip flexor and adductor contracture noted  LLE General PROM: ankle lacking 5 degrees to neutral, knee lacking ~30 degrees extension, flexion to 90 degrees, hip flexor and adductor contracture noted    Strength:  Strength RLE  Comment: no observed spontanous or purposeful movement  Strength LLE  Comment: no observed spontanous or purposeful movement    Neuro:  Balance  Sitting - Static: Poor  Sitting - Dynamic: Poor        Sensation  Overall Sensation Status: WFL(unable to assess, reacts to pressure and movement)    Bed mobility  Comment: dependent, repositioned with pillows to protect jerrica areas and maintain B LE joint alignment    Transfers  Comment: pt appears to be dependent at baseline    Ambulation  Ambulation?: No(pt is non ambulatory)     Wheelchair Activities  Propulsion: (unable to assess or gather pt's ability to propel self with w/c)    Activity Tolerance  Activity Tolerance: Patient Tolerated treatment well    ASSESSMENT:   Body structures, Functions, Activity limitations: Decreased ROM; Decreased strength;Decreased cognition  Decision Making: Low Complexity  History: med  Exam: med  Clinical Presentation: low  No Skilled PT: At baseline function    Prognosis: Poor  Barriers to Learning: cognitive/developmental delay    DISCHARGE RECOMMENDATIONS:  No Skilled PT: At baseline function    Assessment: Pt appears to be dependent at baseline with significant sacral decubitus noted. Pt is non-verbal and unable to provide information on his PLOF. Pt is from group home dependent on caregivers per chart review. Pt requires 24 hr care, assist with all aspects of mobility, positioning for pressure relief, and to maintain ROM of extremities.   REQUIRES PT FOLLOW UP: No      PLAN OF CARE:  Plan  Times per week: eval only  Safety Devices  Type of devices: Left in bed, Call light within reach, Bed alarm in place    Goals:  Patient goals : unable to assess    AMPAC (6 CLICK) BASIC MOBILITY  AM-PAC Inpatient Mobility Raw Score : 6     Therapy Time:   Individual   Time In 1433   Time Out 1448   Minutes 100 [facility], [address] [place] [address], 04/09/21 at 3:10 PM         Definitions for assistance levels  Independent = pt does not require any physical supervision or assistance from another person for activity completion. Device may be needed.   Stand by assistance = pt requires verbal cues or instructions from another person, close to but not touching, to perform the activity  Minimal assistance= pt performs 75% or more of the activity; assistance is required to complete the activity  Moderate assistance= pt performs 50% of the activity; assistance is required to complete the activity  Maximal assistance = pt performs 25% of the activity; assistance is required to complete the activity  Dependent = pt requires total physical assistance to accomplish the task

## 2021-04-09 NOTE — PROGRESS NOTES
Spoke to Adebayo Ledesma RN  Patient non verbal   Unable to complete MRI safety form  Patient has a metal wire inserted into his penis  Adebayo Ledesma will discuss with doctor to switch to another form of imaging instead of MRI

## 2021-04-09 NOTE — PROGRESS NOTES
[facility]ALUATION - ACUTE     NAME: Carlie Evans  : 1950 (78 y.o.)  MRN: [id number]  CODE STATUS: Full Code  Room: A638/I996-24    Date of Service: 2021    Patient Diagnosis(es): Sacral decubitus ulcer [L89.159]   Chief Complaint   Patient presents with    Wound Infection     pt was sent to the from ID for PICC line placement and IV antibiotics      Patient Active Problem List    Diagnosis Date Noted    Severe malnutrition ([address] 2021    Sacral decubitus ulcer 2021    Buttock wound, left, subsequent encounter 2021    Uncontrolled type 2 diabetes mellitus with hyperglycemia ([address]     Wound, open, hip or thigh with complication     Sacral wound, subsequent encounter 2020    Pressure injury of heel, stage 1 10/15/2020    Chronic ulcer of sacral region, with unspecified severity ([address] 2020    Ulcer of perianal area, limited to breakdown of skin ([address] 2020    Foot deformity 02/10/2020    Neurogenic dysfunction of the urinary bladder 2019    Degenerative arthritis of hip 10/29/2013    Full code status 2013    Dietary restriction 2013    Osteopenia     Dermatitis     Eczema     Hypovitaminosis D     VANIA (iron deficiency anemia)     Chronic indwelling Ha catheter     Essential hypertension     Diabetes mellitus due to underlying condition with stage 1 chronic kidney disease, without long-term current use of insulin ([address] 10/05/2012    Seizure disorder ([address] 10/05/2012    GERD (gastroesophageal reflux disease) 10/05/2012    Chronic UTI 10/05/2012    Disorder of intellectual development         Past Medical History:   Diagnosis Date    Anemia     Chronic indwelling Ha catheter     Chronic UTI     Chronic UTI     Dermatitis     DM (diabetes mellitus) ([address]     DM type 2 (diabetes mellitus, type 2) ([address] 10/29/2013    Eczema     Gastric polyp     GERD (gastroesophageal reflux disease)  H/O bone density study 9.2.11    lumbar spine, lt femoral neck osteopenia, rt femoral neck normal    High blood pressure     HTN (hypertension)     Hypokalemia     Hypovitaminosis D     VANIA (iron deficiency anemia)     MR (mental retardation)     Neurogenic dysfunction of the urinary bladder 1/22/2019    Osteopenia     Pressure ulcer of coccygeal region, unstageable ([facility] 09/17/2020    Seizure disorder ([facility]     Wound of back      Past Surgical History:   Procedure Laterality Date    COLONOSCOPY  8.19.11    negative    UPPER GASTROINTESTINAL ENDOSCOPY  8.19.11    by Dr. Andrea Kuhn N/A 11/7/2019    EGD performed by Nic White MD at [facility].        Safety Devices: 5 [facility] in place: Yes  Type of devices: All fall risk precautions in place   Initially in place: No    Subjective:Pt cooperative with evaluation       Pain Reassessment: no outward SOS of pain          Prior Level of Function:  Social/Functional History  Lives With: Alone  Type of Home: (Pt lives in a group home)  Additional Comments: Pt unable to provide PLOF or set up of group home. Pt from clinical assessment appears to be dependent at baseline    OBJECTIVE:     Orientation Status:  Orientation  Overall Orientation Status: (unable to formally assess. Pt identified by wrist band)    Observation:  Observation/Palpation  Posture: Poor  Edema: right hand and bilateral LE especially feet    Cognition Status:  Cognition  Cognition Comment: inconsistently turns head to stimuli    Perception Status:  Perception  Overall Perceptual Status: (Pt noted with minimal attention or turning of head to right side)    Sensation Status:  Sensation  Overall Sensation Status: (Pt exhibits reaction to movement of joints)    Vision and Hearing Status:  Vision  Vision: (unable to formally assess, pt attends to L side of visual field > R side.  follows writer in room inconsistantly)  Hearing  Hearing: (Pt turns head to stimuli)     ROM:   LUE PROM (degrees)  LUE PROM: WFL  Left Hand PROM (degrees)  Left Hand PROM: WFL  RUE PROM (degrees)  RUE General PROM: limited shoulder flexion and abduction  Right Hand PROM (degrees)  Right Hand General PROM: limited flexion    Strength:  LUE Strength  LUE Strength Comment: unable to formally assess  RUE Strength  RUE Strength Comment: unable to formally assess    Coordination, Tone, Quality of Movement:    Tone RUE  RUE Tone: Hypotonic  Tone LUE  LUE Tone: Hypotonic  Coordination  Movements Are Fluid And Coordinated: No(minimal purposeful movment noted)    Hand Dominance:  Hand Dominance  Hand Dominance: (unable to provide)    ADL Status:  ADL  Feeding: Unable to assess(comment)  Grooming: Dependent/Total  UE Bathing: Dependent/Total  LE Bathing: Dependent/Total  UE Dressing: Dependent/Total  LE Dressing: Dependent/Total  Toileting: Unable to assess(comment)          Therapy key for assistance levels -   Independent = Pt. is able to perform task with no assistance but may require a device   Stand by assistance = Pt. does not perform task at an independent level but does not need physical assistance, requires verbal cues  Minimal, Moderate, Maximal Assistance = Pt. requires physical assistance (25%, 50%, 75% assist from helper) for task but is able to actively participate in task   Dependent = Pt. requires total assistance with task and is not able to actively participate with task completion     Functional Mobility:          Bed Mobility       Seated and Standing Balance:  Balance  Sitting Balance: Unable to assess(comment)  Standing Balance: Unable to assess(comment)    Functional Endurance:  Activity Tolerance  Activity Tolerance: Treatment limited secondary to decreased cognition    D/C Recommendations:  OT D/C RECOMMENDATIONS  REQUIRES OT FOLLOW UP: No    Equipment Recommendations:       OT Education:        OT Follow Up:  OT D/C RECOMMENDATIONS  REQUIRES OT FOLLOW UP: No       Assessment/Discharge Disposition:     Performance deficits / Impairments: Decreased ROM, Decreased balance, Decreased functional mobility , Decreased ADL status, Decreased strength, Decreased fine motor control, Decreased posture, Decreased cognition, Decreased endurance  Prognosis: Guarded  Discharge Recommendations: Continue to assess pending progress  No Skilled OT: At baseline function  Decision Making: Low Complexity  History: multiple  Exam: multiple  Assistance / Modification: dependent    Six Click Score    How much help for putting on and taking off regular lower body clothing?: Total  How much help for Bathing?: Total  How much help for Toileting?: Total  How much help for putting on and taking off regular upper body clothing?: Total  How much help for taking care of personal grooming?: Total  How much help for eating meals?: Total  AM-PAC Inpatient Daily Activity Raw Score: 6  AM-PAC Inpatient ADL T-Scale Score : 17.07  ADL Inpatient CMS 0-100% Score: 100    Plan:  Plan  Times per week: N/A    Goals:   Patient will:      Patient Goal: Patient goals : Not stated      Discussed and agreed upon: No Comments: Cognition at this time    Therapy Time:   OT Individual Minutes  Time In: 5983  Time Out: 1448  Minutes: 15    Eval: 15 minutes     Electronically signed by:    SHAWN Whalen  6/1/8110, 3:06 PM Electronically signed by SHAWN Whalen on 2/3/09 at 3:05 PM EDT

## 2021-04-09 NOTE — CONSULTS
PODIATRIC MEDICINE AND SURGERY  CONSULT HISTORY AND PHYSICAL    Consulting Service: Medicine  Requesting Provider: Alex Garcia  Opinion/advice regarding: Left hallux ingrown nail  Staff Doctor:  Dr. Roberson Graft:  78 y.o. male with PMH significant for MRDD, HTN, Dm2, neurogenic bladder with indwelling gayle and chronic UTI's, seizures, and sacral ulcer growing proteus/ pseudomonas with ingrown left lateral nail border without signs of infection. PLAN AND RECOMMENDATIONS[de-identified]  Patient's case to be discussed with staff, Dr. Rodo Adams, who will provide final recommendations going forward  After cleaning with alcohol swab, left hallux lateral nail border was excised with sterile nail nippers and impeding fragment removed in total. No need for anesthesia. No drainage or bleeding following nail procedure. Podiatry to sign off; please re-consult with new or worsening pedal issues. HPI: This 78y.o. year old male was seen today for ingrown left hallux nail. Has past medical history of MRDD, HTN, Dm2, neurogenic bladder with indwelling gayle and chronic UTI's, seizures who was sent to the ED at the request of Dr. Erica Contreras for PICC, IV Zosyn and likely placement. Pt has been on PO abx for his sacral pressurs ulcer, but recent cultures came back with proteus and pseudomonas so ID recommended admission for IV abx.       Pt is nonverbal at baseline so history obtained through EMR    Past Medical History:   Diagnosis Date    Anemia     Chronic indwelling Gayle catheter     Chronic UTI     Chronic UTI     Dermatitis     [facility]     DM type 2 (diabetes mellitus, type 2) ([facility] 10/29/2013    Eczema     Gastric polyp     GERD (gastroesophageal reflux disease)     H/O bone density study 9.2.11    lumbar spine, lt femoral neck osteopenia, rt femoral neck normal    High blood pressure     HTN (hypertension)     Hypokalemia     Hypovitaminosis D     VANIA (iron deficiency anemia)      (mental retardation)     Neurogenic dysfunction of the urinary bladder 1/22/2019    Osteopenia     Pressure ulcer of coccygeal region, unstageable ([facility] 09/17/2020    Seizure disorder ([facility]     Wound of back        Past Surgical History:   Procedure Laterality Date    COLONOSCOPY  8.19.11    negative    UPPER GASTROINTESTINAL ENDOSCOPY  8.19.11    by Dr. Anup Vogel N/A 11/7/2019    EGD performed by Alma Quinteros MD at [facility]       No current facility-administered medications on file prior to encounter. Current Outpatient Medications on File Prior to Encounter   Medication Sig Dispense Refill    sertraline (ZOLOFT) 100 MG tablet TAKE ONE (1) TABLET BY MOUTH ONCE (1) DAILY IN THE MORNING (ANTI-DEPRESSANT) 90 tablet 0    loratadine (CLARITIN) 10 MG tablet TAKE ONE (1) TABLET BY MOUTH ONCE (1) DAILY (PRURITIC DERMATITUS) 90 tablet 0    vitamin B-12 (CYANOCOBALAMIN) 1000 MCG tablet Take 1,000 mcg by mouth daily      JANUVIA 100 MG tablet TAKE ONE (1) TABLET BY MOUTH ONCE (1) DAILY 30 tablet 0    loperamide (IMODIUM) 2 MG capsule Take 2 mg by mouth 4 times daily as needed for Diarrhea      Na Sulfate-K Sulfate-Mg Sulf 17.5-3.13-1.6 GM/177ML SOLN As directed 1 Bottle 0    Lancet Device MISC 1 Device by Does not apply route once for 1 dose 1 Device 0    ammonium lactate (LAC-HYDRIN) 12 % lotion Apply topically as needed for Dry Skin Apply topically as needed.  aspirin 81 MG chewable tablet Take 81 mg by mouth daily      folic acid (FOLVITE) 1 MG tablet Take 1 mg by mouth daily      Mis. Devices [facility]) MISC Manual wheel chair with foot petals  A50.49 1 each 0    Elastic Bandages & Supports (T.E.D. KNEE LENGTH/M-REGULAR) MISC 1 set of MINERVA stockings. Apply daily while upright in wheelchair as needed for dependent edema control.   Pharmacy/ DME may adjust size as appropriate with corrected measurement 1 each 0    amLODIPine (NORVASC) 5 MG tablet Take 5 mg by mouth daily      acetaminophen (TYLENOL) 325 MG tablet Take 325 mg by mouth daily      atorvastatin (LIPITOR) 10 MG tablet Take 10 mg by mouth daily      busPIRone (BUSPAR) 15 MG tablet Take 15 mg by mouth 3 times daily      cloNIDine (CATAPRES) 0.1 MG tablet Take 0.1 mg by mouth 3 times daily      Cranberry 500 MG CAPS Take 500 mg by mouth 2 times daily      ferrous sulfate 325 (65 Fe) MG tablet Take 325 mg by mouth 2 times daily      levETIRAcetam (KEPPRA) 750 MG tablet Take 750 mg by mouth 2 times daily      lisinopril (PRINIVIL;ZESTRIL) 40 MG tablet Take 40 mg by mouth 2 times daily      metFORMIN (GLUCOPHAGE) 500 MG tablet Take 1,000 mg by mouth 2 times daily (with meals)       Multiple Vitamin (MULTIVITAMINS PO) Take by mouth daily      calcium carbonate (OSCAL) 500 MG TABS tablet Take 500 mg by mouth 2 times daily      pantoprazole (PROTONIX) 40 MG tablet Take 40 mg by mouth daily      pioglitazone (ACTOS) 30 MG tablet Take 45 mg by mouth daily       Pyridoxine HCl (VITAMIN B-6) 50 MG tablet Take 25 mg by mouth daily      Cholecalciferol (VITAMIN D3) 2000 units CAPS Take by mouth Daily with lunch      ondansetron (ZOFRAN) 4 MG tablet Take 4 mg by mouth every 6 hours as needed for Nausea or Vomiting      promethazine (PHENERGAN) 25 MG suppository Place 25 mg rectally every 6 hours as needed for Nausea         No Known Allergies    History reviewed. No pertinent family history.     Social History     Socioeconomic History    Marital status: Single     Spouse name: Not on file    Number of children: Not on file    Years of education: Not on file    Highest education level: Not on file   Occupational History    Not on file   Social Needs    Financial resource strain: Not on file    Food insecurity     Worry: Not on file     Inability: Not on file    Transportation needs     Medical: Not on file     Non-medical: Not on file   Tobacco Use    Smoking status: Never Smoker    Smokeless 04/09/2021     04/09/2021     Lab Results   Component Value Date     04/09/2021    K 3.2 04/09/2021     04/09/2021    CO2 25 04/09/2021    BUN 19 04/09/2021    CREATININE 0.40 04/09/2021    GLUCOSE 203 04/09/2021    GLUCOSE 89 06/01/2012    CALCIUM 8.3 04/09/2021      Lab Results   Component Value Date    LABALBU 3.0 (L) 04/08/2021     Lab Results   Component Value Date    SEDRATE 63 (H) 04/08/2021     Lab Results   Component Value Date    CRP 9.4 (H) 04/08/2021     Lab Results   Component Value Date    LABA1C 8.8 (H) 03/04/2021       Claudell Lente, DPM PGY-2  Podiatric Surgery Resident  Podiatry On Call Pager: [phone]  April 9, 2021  11:09 AM

## 2021-04-10 LAB
ANION GAP SERPL CALCULATED.3IONS-SCNC: 10 MEQ/L (ref 9–15)
BUN BLDV-MCNC: 17 MG/DL (ref 8–23)
CALCIUM SERPL-MCNC: 8.9 MG/DL (ref 8.5–9.9)
CHLORIDE BLD-SCNC: 104 MEQ/L (ref 95–107)
CO2: 28 MEQ/L (ref 20–31)
CREAT SERPL-MCNC: 0.37 MG/DL (ref 0.7–1.2)
GFR AFRICAN AMERICAN: >60
GFR NON-AFRICAN AMERICAN: >60
GLUCOSE BLD-MCNC: 195 MG/DL (ref 70–99)
GLUCOSE BLD-MCNC: 197 MG/DL (ref 60–115)
GLUCOSE BLD-MCNC: 216 MG/DL (ref 60–115)
GLUCOSE BLD-MCNC: 265 MG/DL (ref 60–115)
GLUCOSE BLD-MCNC: 286 MG/DL (ref 60–115)
GLUCOSE BLD-MCNC: 324 MG/DL (ref 60–115)
HCT VFR BLD CALC: 32.8 % (ref 42–52)
HEMOGLOBIN: 10.9 G/DL (ref 14–18)
MAGNESIUM: 1.2 MG/DL (ref 1.7–2.4)
MCH RBC QN AUTO: 29.8 PG (ref 27–31.3)
MCHC RBC AUTO-ENTMCNC: 33.3 % (ref 33–37)
MCV RBC AUTO: 89.3 FL (ref 80–100)
PDW BLD-RTO: 16.7 % (ref 11.5–14.5)
PERFORMED ON: ABNORMAL
PLATELET # BLD: 341 K/UL (ref 130–400)
POTASSIUM REFLEX MAGNESIUM: 2.7 MEQ/L (ref 3.4–4.9)
RBC # BLD: 3.67 M/UL (ref 4.7–6.1)
SODIUM BLD-SCNC: 142 MEQ/L (ref 135–144)
VANCOMYCIN RANDOM: 18.1 UG/ML (ref 10–40)
VANCOMYCIN TROUGH: 33.3 UG/ML (ref 10–20)
WBC # BLD: 13 K/UL (ref 4.8–10.8)

## 2021-04-10 PROCEDURE — 2580000003 HC RX 258: Performed by: INTERNAL MEDICINE

## 2021-04-10 PROCEDURE — 6360000002 HC RX W HCPCS: Performed by: INTERNAL MEDICINE

## 2021-04-10 PROCEDURE — 36592 COLLECT BLOOD FROM PICC: CPT

## 2021-04-10 PROCEDURE — 80048 BASIC METABOLIC PNL TOTAL CA: CPT

## 2021-04-10 PROCEDURE — 85027 COMPLETE CBC AUTOMATED: CPT

## 2021-04-10 PROCEDURE — 80202 ASSAY OF VANCOMYCIN: CPT

## 2021-04-10 PROCEDURE — 83735 ASSAY OF MAGNESIUM: CPT

## 2021-04-10 PROCEDURE — 6370000000 HC RX 637 (ALT 250 FOR IP): Performed by: INTERNAL MEDICINE

## 2021-04-10 PROCEDURE — 1210000000 HC MED SURG R&B

## 2021-04-10 RX ORDER — MAGNESIUM SULFATE 1 G/100ML
1000 INJECTION INTRAVENOUS PRN
Status: DISCONTINUED | OUTPATIENT
Start: 2021-04-10 | End: 2021-04-12 | Stop reason: HOSPADM

## 2021-04-10 RX ORDER — POTASSIUM CHLORIDE 20 MEQ/1
40 TABLET, EXTENDED RELEASE ORAL PRN
Status: DISCONTINUED | OUTPATIENT
Start: 2021-04-10 | End: 2021-04-12 | Stop reason: HOSPADM

## 2021-04-10 RX ORDER — POTASSIUM CHLORIDE 7.45 MG/ML
10 INJECTION INTRAVENOUS PRN
Status: DISCONTINUED | OUTPATIENT
Start: 2021-04-10 | End: 2021-04-12 | Stop reason: HOSPADM

## 2021-04-10 RX ADMIN — POTASSIUM CHLORIDE 10 MEQ: 7.46 INJECTION, SOLUTION INTRAVENOUS at 16:11

## 2021-04-10 RX ADMIN — AMLODIPINE BESYLATE 5 MG: 5 TABLET ORAL at 08:57

## 2021-04-10 RX ADMIN — POTASSIUM CHLORIDE 10 MEQ: 7.46 INJECTION, SOLUTION INTRAVENOUS at 15:00

## 2021-04-10 RX ADMIN — INSULIN LISPRO 4 UNITS: 100 INJECTION, SOLUTION INTRAVENOUS; SUBCUTANEOUS at 16:47

## 2021-04-10 RX ADMIN — INSULIN LISPRO 3 UNITS: 100 INJECTION, SOLUTION INTRAVENOUS; SUBCUTANEOUS at 12:01

## 2021-04-10 RX ADMIN — ATORVASTATIN CALCIUM 10 MG: 10 TABLET, FILM COATED ORAL at 00:52

## 2021-04-10 RX ADMIN — ASPIRIN 81 MG: 81 TABLET, COATED ORAL at 08:58

## 2021-04-10 RX ADMIN — ATORVASTATIN CALCIUM 10 MG: 10 TABLET, FILM COATED ORAL at 22:35

## 2021-04-10 RX ADMIN — LEVETIRACETAM 750 MG: 500 TABLET ORAL at 08:57

## 2021-04-10 RX ADMIN — POTASSIUM CHLORIDE 10 MEQ: 7.46 INJECTION, SOLUTION INTRAVENOUS at 18:23

## 2021-04-10 RX ADMIN — CLONIDINE HYDROCHLORIDE 0.1 MG: 0.1 TABLET ORAL at 00:52

## 2021-04-10 RX ADMIN — CLONIDINE HYDROCHLORIDE 0.1 MG: 0.1 TABLET ORAL at 14:01

## 2021-04-10 RX ADMIN — CLONIDINE HYDROCHLORIDE 0.1 MG: 0.1 TABLET ORAL at 08:57

## 2021-04-10 RX ADMIN — POTASSIUM CHLORIDE 10 MEQ: 7.46 INJECTION, SOLUTION INTRAVENOUS at 17:18

## 2021-04-10 RX ADMIN — ENOXAPARIN SODIUM 40 MG: 40 INJECTION SUBCUTANEOUS at 08:56

## 2021-04-10 RX ADMIN — SODIUM CHLORIDE, PRESERVATIVE FREE 10 ML: 5 INJECTION INTRAVENOUS at 22:35

## 2021-04-10 RX ADMIN — LISINOPRIL 40 MG: 20 TABLET ORAL at 08:57

## 2021-04-10 RX ADMIN — LEVETIRACETAM 750 MG: 500 TABLET ORAL at 00:52

## 2021-04-10 RX ADMIN — LEVETIRACETAM 750 MG: 500 TABLET ORAL at 22:35

## 2021-04-10 RX ADMIN — MAGNESIUM SULFATE HEPTAHYDRATE 1000 MG: 1 INJECTION, SOLUTION INTRAVENOUS at 10:41

## 2021-04-10 RX ADMIN — CLONIDINE HYDROCHLORIDE 0.1 MG: 0.1 TABLET ORAL at 22:35

## 2021-04-10 RX ADMIN — PIPERACILLIN AND TAZOBACTAM 3375 MG: 3; .375 INJECTION, POWDER, LYOPHILIZED, FOR SOLUTION INTRAVENOUS at 10:41

## 2021-04-10 RX ADMIN — PIPERACILLIN AND TAZOBACTAM 3375 MG: 3; .375 INJECTION, POWDER, LYOPHILIZED, FOR SOLUTION INTRAVENOUS at 18:23

## 2021-04-10 RX ADMIN — MAGNESIUM SULFATE HEPTAHYDRATE 1000 MG: 1 INJECTION, SOLUTION INTRAVENOUS at 11:48

## 2021-04-10 RX ADMIN — PIPERACILLIN AND TAZOBACTAM 3375 MG: 3; .375 INJECTION, POWDER, LYOPHILIZED, FOR SOLUTION INTRAVENOUS at 03:49

## 2021-04-10 RX ADMIN — FOLIC ACID 1 MG: 1 TABLET ORAL at 08:57

## 2021-04-10 RX ADMIN — PANTOPRAZOLE SODIUM 40 MG: 40 TABLET, DELAYED RELEASE ORAL at 05:43

## 2021-04-10 RX ADMIN — INSULIN LISPRO 2 UNITS: 100 INJECTION, SOLUTION INTRAVENOUS; SUBCUTANEOUS at 08:58

## 2021-04-10 RX ADMIN — MAGNESIUM SULFATE HEPTAHYDRATE 1000 MG: 1 INJECTION, SOLUTION INTRAVENOUS at 15:39

## 2021-04-10 RX ADMIN — MAGNESIUM SULFATE HEPTAHYDRATE 1000 MG: 1 INJECTION, SOLUTION INTRAVENOUS at 14:35

## 2021-04-10 RX ADMIN — POTASSIUM CHLORIDE 10 MEQ: 7.46 INJECTION, SOLUTION INTRAVENOUS at 12:51

## 2021-04-10 RX ADMIN — SERTRALINE HYDROCHLORIDE 100 MG: 50 TABLET ORAL at 08:56

## 2021-04-10 RX ADMIN — VANCOMYCIN HYDROCHLORIDE 1000 MG: 1 INJECTION, POWDER, LYOPHILIZED, FOR SOLUTION INTRAVENOUS at 22:36

## 2021-04-10 RX ADMIN — POTASSIUM CHLORIDE 10 MEQ: 7.46 INJECTION, SOLUTION INTRAVENOUS at 14:00

## 2021-04-10 NOTE — PROGRESS NOTES
Pharmacy Vancomycin Consult     Vancomycin Day: 3  Current Dosing: vancomycin 1000mg IV q8hr    Temp max:  98.2F    Recent Labs     04/08/21  1200 04/09/21  0600   BUN 19 19   CREATININE 0.36* 0.40*   WBC 11.1* 10.0       Intake/Output Summary (Last 24 hours) at 4/10/2021 4382  Last data filed at 4/10/2021 0123  Gross per 24 hour   Intake 1290 ml   Output 1550 ml   Net -260 ml     Culture Date      Source                       Results  Blood Culture, Routine   Date/Time Value Ref Range Status   04/08/2021 12:39 PM   Preliminary    No Growth to date. Any change in status will be called. Culture, Blood 2   Date/Time Value Ref Range Status   04/08/2021 12:39 PM   Preliminary    No Growth to date. Any change in status will be called. Ht Readings from Last 1 Encounters:   04/08/21 5' 10\" (1.778 m)        Wt Readings from Last 1 Encounters:   04/08/21 118 lb (53.5 kg)       Body mass index is 16.93 kg/m². Estimated Creatinine Clearance: 134 mL/min (A) (based on SCr of 0.4 mg/dL (L)). Trough: 33.3 (drawn 1 hour before dose)    Assessment/Plan:  Trough was drawn 1 hour early. Calculations estimate trough still critical level around 29 mcg/ml. Will stop therapy at this time and follow up with random level tonight around 1800, 24 hours since last dose. Will resume therapy at lower dose once random level < 20. Thank you,    JOY Mcrae. Ph.  4/10/2021  3:15 AM

## 2021-04-10 NOTE — PROGRESS NOTES
Shift assessment complete. Pt nonverbal. meds given per mar. BP elevated will recheck in an hour. Ha intact, drained 1000ml. Pt had lg BM, greyish-green in color. Call light within reach. Will continue to monitor.      Electronically signed by La Nena Hayes RN on 4/10/2021 at 1:26 AM

## 2021-04-10 NOTE — PROGRESS NOTES
Department of Internal Medicine  General Internal Medicine  Attending Progress Note      SUBJECTIVE:  Pt seen and examined.  Nonverbal. No issues per nursing    OBJECTIVE      Medications    Current Facility-Administered Medications: potassium chloride (KLOR-CON M) extended release tablet 40 mEq, 40 mEq, Oral, PRN **OR** potassium bicarb-citric acid (EFFER-K) effervescent tablet 40 mEq, 40 mEq, Oral, PRN **OR** potassium chloride 10 mEq/100 mL IVPB (Peripheral Line), 10 mEq, Intravenous, PRN  magnesium sulfate 1000 mg in dextrose 5% 100 mL IVPB, 1,000 mg, Intravenous, PRN  cloNIDine (CATAPRES) tablet 0.1 mg, 0.1 mg, Oral, TID  sodium chloride flush 0.9 % injection 5-40 mL, 5-40 mL, Intravenous, 2 times per day  sodium chloride flush 0.9 % injection 5-40 mL, 5-40 mL, Intravenous, PRN  0.9 % sodium chloride infusion, 25 mL, Intravenous, PRN  enoxaparin (LOVENOX) injection 40 mg, 40 mg, Subcutaneous, Daily  acetaminophen (TYLENOL) tablet 650 mg, 650 mg, Oral, Q6H PRN **OR** acetaminophen (TYLENOL) suppository 650 mg, 650 mg, Rectal, Q6H PRN  piperacillin-tazobactam (ZOSYN) 3,375 mg in dextrose 5 % 50 mL IVPB extended infusion (mini-bag), 3,375 mg, Intravenous, Q8H  sodium chloride flush 0.9 % injection 5-40 mL, 5-40 mL, Intravenous, 2 times per day  sodium chloride flush 0.9 % injection 5-40 mL, 5-40 mL, Intravenous, PRN  0.9 % sodium chloride infusion, 25 mL, Intravenous, PRN  insulin lispro (HUMALOG) injection vial 0-6 Units, 0-6 Units, Subcutaneous, TID WC  insulin lispro (HUMALOG) injection vial 0-3 Units, 0-3 Units, Subcutaneous, Nightly  glucose (GLUTOSE) 40 % oral gel 15 g, 15 g, Oral, PRN  dextrose 50 % IV solution, 12.5 g, Intravenous, PRN  glucagon (rDNA) injection 1 mg, 1 mg, Intramuscular, PRN  dextrose 5 % solution, 100 mL/hr, Intravenous, PRN  sertraline (ZOLOFT) tablet 100 mg, 100 mg, Oral, Daily  aspirin EC tablet 81 mg, 81 mg, Oral, Daily  folic acid (FOLVITE) tablet 1 mg, 1 mg, Oral, Daily  amLODIPine (NORVASC) tablet 5 mg, 5 mg, Oral, Daily  atorvastatin (LIPITOR) tablet 10 mg, 10 mg, Oral, Nightly  levETIRAcetam (KEPPRA) tablet 750 mg, 750 mg, Oral, BID  pantoprazole (PROTONIX) tablet 40 mg, 40 mg, Oral, QAM AC  lisinopril (PRINIVIL;ZESTRIL) tablet 40 mg, 40 mg, Oral, Daily  vancomycin (VANCOCIN) intermittent dosing (placeholder), , Other, RX Placeholder  Physical    VITALS:  BP (!) 151/92   Pulse 93   Temp 98.1 °F (36.7 °C) (Oral)   Resp 18   Ht 5' 10\" (1.778 m)   Wt 118 lb (53.5 kg)   SpO2 95%   BMI 16.93 kg/m²   Constitutional: Lying in bed comfortably, nonverbal at baseline  Head: Normocephalic, atraumatic  Eyes: EOMI, PERRLA  ENT: moist mucous membranes  Neck: neck supple, trachea midline  Lungs: Good inspiratory effort, no wheeze, no rhonchi, no rales  Heart: RRR, normal S1 and S2  GI: Soft, non-distended, non tender, no guarding, no rebound, +BS  MSK: no edema noted  SKIN: stage 3 sacral decubitus ulcer with clean dressing in place  Data    CBC:   Lab Results   Component Value Date    WBC 13.0 04/10/2021    RBC 3.67 04/10/2021    RBC 3.63 06/01/2012    HGB 10.9 04/10/2021    HCT 32.8 04/10/2021    MCV 89.3 04/10/2021    MCH 29.8 04/10/2021    MCHC 33.3 04/10/2021    RDW 16.7 04/10/2021     04/10/2021    MPV 10.5 08/15/2013     CMP:    Lab Results   Component Value Date     04/10/2021    K 2.7 04/10/2021     04/10/2021    CO2 28 04/10/2021    BUN 17 04/10/2021    CREATININE 0.37 04/10/2021    GFRAA >60.0 04/10/2021    LABGLOM >60.0 04/10/2021    GLUCOSE 195 04/10/2021    GLUCOSE 89 06/01/2012    PROT 6.8 04/08/2021    LABALBU 3.0 04/08/2021    LABALBU 4.0 06/01/2012    CALCIUM 8.9 04/10/2021    BILITOT <0.2 04/08/2021    ALKPHOS 156 04/08/2021    AST 15 04/08/2021    ALT <5 04/08/2021       ASSESSMENT AND PLAN      # Pseudomonas sacracl decubitus ulcer  - was on PO abx outpatient  - WCx from 3/25 shows proteus and pseudomonas  - started on IV Zosyn, vanco  - ID consulted- will likely need IV abx for 4-6 weeks  - repeat cultures drawn  - PICC ordered for long term IV abx  - PT/OT  - luca consulted for debridement  - wound care  - daily labs     # HTN, HLD, DM2  - cont home meds  - ISS, holding PO diabetic meds     # Seizures  - cont home meds     # Neurogenic bladder with indwelling gayle  - chronic UTI's  - UA with bacteria, LE, nitrite - UTI vs colonization?  - on zosyn     DVT: lovenox     Disposition: pt with sacral ulcer growing pseudomonas admitted for PICC and IV abx and likely placement. ID and surgery consulted. Wound care, PT/OT. Will need placement for IV abx.       Haily Almendarez,   Internal Medicine

## 2021-04-11 LAB
ANION GAP SERPL CALCULATED.3IONS-SCNC: 9 MEQ/L (ref 9–15)
BUN BLDV-MCNC: 16 MG/DL (ref 8–23)
CALCIUM SERPL-MCNC: 8.4 MG/DL (ref 8.5–9.9)
CHLORIDE BLD-SCNC: 103 MEQ/L (ref 95–107)
CO2: 28 MEQ/L (ref 20–31)
CREAT SERPL-MCNC: 0.33 MG/DL (ref 0.7–1.2)
GFR AFRICAN AMERICAN: >60
GFR NON-AFRICAN AMERICAN: >60
GLUCOSE BLD-MCNC: 114 MG/DL (ref 70–99)
GLUCOSE BLD-MCNC: 147 MG/DL (ref 60–115)
GLUCOSE BLD-MCNC: 242 MG/DL (ref 60–115)
GLUCOSE BLD-MCNC: 248 MG/DL (ref 60–115)
GLUCOSE BLD-MCNC: 280 MG/DL (ref 60–115)
HCT VFR BLD CALC: 29.7 % (ref 42–52)
HEMOGLOBIN: 10 G/DL (ref 14–18)
MAGNESIUM: 1.9 MG/DL (ref 1.7–2.4)
MCH RBC QN AUTO: 30.1 PG (ref 27–31.3)
MCHC RBC AUTO-ENTMCNC: 33.8 % (ref 33–37)
MCV RBC AUTO: 89.2 FL (ref 80–100)
ORGANISM: ABNORMAL
ORGANISM: ABNORMAL
PDW BLD-RTO: 16.3 % (ref 11.5–14.5)
PERFORMED ON: ABNORMAL
PLATELET # BLD: 314 K/UL (ref 130–400)
POTASSIUM REFLEX MAGNESIUM: 3.3 MEQ/L (ref 3.4–4.9)
RBC # BLD: 3.33 M/UL (ref 4.7–6.1)
SODIUM BLD-SCNC: 140 MEQ/L (ref 135–144)
URINE CULTURE, ROUTINE: ABNORMAL
URINE CULTURE, ROUTINE: ABNORMAL
WBC # BLD: 11.3 K/UL (ref 4.8–10.8)

## 2021-04-11 PROCEDURE — 80048 BASIC METABOLIC PNL TOTAL CA: CPT

## 2021-04-11 PROCEDURE — 6360000002 HC RX W HCPCS: Performed by: INTERNAL MEDICINE

## 2021-04-11 PROCEDURE — XW033N5 INTRODUCTION OF MEROPENEM-VABORBACTAM ANTI-INFECTIVE INTO PERIPHERAL VEIN, PERCUTANEOUS APPROACH, NEW TECHNOLOGY GROUP 5: ICD-10-PCS | Performed by: INTERNAL MEDICINE

## 2021-04-11 PROCEDURE — 2580000003 HC RX 258: Performed by: INTERNAL MEDICINE

## 2021-04-11 PROCEDURE — 85027 COMPLETE CBC AUTOMATED: CPT

## 2021-04-11 PROCEDURE — 6370000000 HC RX 637 (ALT 250 FOR IP): Performed by: INTERNAL MEDICINE

## 2021-04-11 PROCEDURE — 99232 SBSQ HOSP IP/OBS MODERATE 35: CPT | Performed by: INTERNAL MEDICINE

## 2021-04-11 PROCEDURE — 83735 ASSAY OF MAGNESIUM: CPT

## 2021-04-11 PROCEDURE — 1210000000 HC MED SURG R&B

## 2021-04-11 RX ADMIN — PANTOPRAZOLE SODIUM 40 MG: 40 TABLET, DELAYED RELEASE ORAL at 06:05

## 2021-04-11 RX ADMIN — LISINOPRIL 40 MG: 20 TABLET ORAL at 09:08

## 2021-04-11 RX ADMIN — LEVETIRACETAM 750 MG: 500 TABLET ORAL at 21:00

## 2021-04-11 RX ADMIN — POTASSIUM CHLORIDE 40 MEQ: 1500 TABLET, EXTENDED RELEASE ORAL at 12:10

## 2021-04-11 RX ADMIN — ASPIRIN 81 MG: 81 TABLET, COATED ORAL at 09:08

## 2021-04-11 RX ADMIN — AMLODIPINE BESYLATE 5 MG: 5 TABLET ORAL at 09:08

## 2021-04-11 RX ADMIN — CLONIDINE HYDROCHLORIDE 0.1 MG: 0.1 TABLET ORAL at 09:08

## 2021-04-11 RX ADMIN — PIPERACILLIN AND TAZOBACTAM 3375 MG: 3; .375 INJECTION, POWDER, LYOPHILIZED, FOR SOLUTION INTRAVENOUS at 02:14

## 2021-04-11 RX ADMIN — INSULIN LISPRO 1 UNITS: 100 INJECTION, SOLUTION INTRAVENOUS; SUBCUTANEOUS at 09:20

## 2021-04-11 RX ADMIN — SODIUM CHLORIDE, PRESERVATIVE FREE 10 ML: 5 INJECTION INTRAVENOUS at 21:01

## 2021-04-11 RX ADMIN — INSULIN LISPRO 2 UNITS: 100 INJECTION, SOLUTION INTRAVENOUS; SUBCUTANEOUS at 17:46

## 2021-04-11 RX ADMIN — ATORVASTATIN CALCIUM 10 MG: 10 TABLET, FILM COATED ORAL at 21:01

## 2021-04-11 RX ADMIN — ENOXAPARIN SODIUM 40 MG: 40 INJECTION SUBCUTANEOUS at 09:07

## 2021-04-11 RX ADMIN — CLONIDINE HYDROCHLORIDE 0.1 MG: 0.1 TABLET ORAL at 13:21

## 2021-04-11 RX ADMIN — MEROPENEM 1000 MG: 1 INJECTION, POWDER, FOR SOLUTION INTRAVENOUS at 23:24

## 2021-04-11 RX ADMIN — CLONIDINE HYDROCHLORIDE 0.1 MG: 0.1 TABLET ORAL at 21:01

## 2021-04-11 RX ADMIN — LEVETIRACETAM 750 MG: 500 TABLET ORAL at 09:08

## 2021-04-11 RX ADMIN — PIPERACILLIN AND TAZOBACTAM 3375 MG: 3; .375 INJECTION, POWDER, LYOPHILIZED, FOR SOLUTION INTRAVENOUS at 09:09

## 2021-04-11 RX ADMIN — INSULIN LISPRO 2 UNITS: 100 INJECTION, SOLUTION INTRAVENOUS; SUBCUTANEOUS at 12:10

## 2021-04-11 RX ADMIN — VANCOMYCIN HYDROCHLORIDE 1000 MG: 1 INJECTION, POWDER, LYOPHILIZED, FOR SOLUTION INTRAVENOUS at 21:00

## 2021-04-11 RX ADMIN — SERTRALINE HYDROCHLORIDE 100 MG: 50 TABLET ORAL at 09:07

## 2021-04-11 RX ADMIN — MEROPENEM 1000 MG: 1 INJECTION, POWDER, FOR SOLUTION INTRAVENOUS at 15:51

## 2021-04-11 RX ADMIN — VANCOMYCIN HYDROCHLORIDE 1000 MG: 1 INJECTION, POWDER, LYOPHILIZED, FOR SOLUTION INTRAVENOUS at 09:09

## 2021-04-11 RX ADMIN — FOLIC ACID 1 MG: 1 TABLET ORAL at 09:08

## 2021-04-11 ASSESSMENT — PAIN SCALES - GENERAL: PAINLEVEL_OUTOF10: 0

## 2021-04-11 NOTE — PROGRESS NOTES
Infectious Disease Progress Note       4/11/2021    Patient is a followup regarding infected decubitus ulcer of the sacrum with probable OM pelvis. CX from the wound with proteus and pseudomonas  + UTI with pseudomonas and providentia. Called the micro lab for sensitivities. The pseudomonas is R from the urine to Zosyn. + neurogenic bladder    Currently on Vanco and Zosyn x 6 weeks. Seen with nurse at bedside. Patient is nonverbal.     General: Patient appears in no acute distress  Skin: no new rashes   HEENTNeck is supple  Lungs: clear bilaterally  Abdomen: soft, ND, NTTP, +BS  Extrem:bilateral LE swelling  Neuro exam: CN II-XII intact          Lab Results   Component Value Date    WBC 11.3 (H) 04/11/2021    HGB 10.0 (L) 04/11/2021    HCT 29.7 (L) 04/11/2021    MCV 89.2 04/11/2021     04/11/2021     Lab Results   Component Value Date     04/11/2021    K 3.3 04/11/2021     04/11/2021    CO2 28 04/11/2021    BUN 16 04/11/2021    CREATININE 0.33 04/11/2021    GLUCOSE 114 04/11/2021    GLUCOSE 89 06/01/2012    CALCIUM 8.4 04/11/2021        WBC trends are being monitored. Antibiotic doses are being adjusted per most recent renal labs.      Vitals:    04/11/21 0801   BP: 122/80   Pulse: 69   Resp: 18   Temp: 97.5 °F (36.4 °C)   SpO2: 99%           Patient Active Problem List   Diagnosis    Disorder of intellectual development    Diabetes mellitus due to underlying condition with stage 1 chronic kidney disease, without long-term current use of insulin (HCC)    Seizure disorder (HCC)    GERD (gastroesophageal reflux disease)    Chronic UTI    Osteopenia    Dermatitis    Eczema    Hypovitaminosis D    VANIA (iron deficiency anemia)    Chronic indwelling Ha catheter    Essential hypertension    Full code status    Dietary restriction    Degenerative arthritis of hip    Neurogenic dysfunction of the urinary bladder    Foot deformity    Chronic ulcer of sacral region, with unspecified severity ([address]    Ulcer of perianal area, limited to breakdown of skin ([address]    Pressure injury of heel, stage 1    Sacral wound, subsequent encounter    Wound, open, hip or thigh with complication    Uncontrolled type 2 diabetes mellitus with hyperglycemia ([address]    Buttock wound, left, subsequent encounter    Sacral decubitus ulcer    Severe malnutrition (HCC)           ASSESSMENT:  Patient is a followup regarding infected decubitus ulcer of the sacrum with probable OM pelvis. CX from the wound with proteus and pseudomonas  + UTI with pseudomonas. Called the micro lab for sensitivities. The pseudomonas is R from the urine to Zosyn.  + neurogenic bladder    PLAN:  Vanco  Change Zosyn to Meropenem  Planning 4-6 weeks and re-evaluation  Weekly CRP on discharge please          Darcy Delgadillo DO

## 2021-04-11 NOTE — PLAN OF CARE
Problem: Skin Integrity:  Goal: Will show no infection signs and symptoms  Outcome: Ongoing  Goal: Absence of new skin breakdown  Outcome: Ongoing     Problem: Falls - Risk of:  Goal: Will remain free from falls  Outcome: Ongoing  Goal: Absence of physical injury  Outcome: Ongoing     Problem: Nutrition  Goal: Optimal nutrition therapy  Outcome: Ongoing     Problem: IP SWALLOWING  Goal: LTG - patient will tolerate the least restrictive diet consistency to allow for safe consumption of daily meals  Outcome: Ongoing

## 2021-04-11 NOTE — PROGRESS NOTES
Pt resting in bed. Non verbal. Turned and repotioned q 2 hours. meds given whole in applesauce. IV antibiotics per orders. Dressing to sacral area CDI. Ha draining yellow urine. Hourly rounds continue. Call light in reach.

## 2021-04-11 NOTE — PROGRESS NOTES
Hospitalist Progress Note      Date of Admission: 4/8/2021  Chief Complaint:    Chief Complaint   Patient presents with    Wound Infection     pt was sent to the from ID for PICC line placement and IV antibiotics      Subjective:  No new complaints.   No nausea, vomiting, chest pain, or headache      Medications:    Infusion Medications    sodium chloride      sodium chloride      dextrose       Scheduled Medications    meropenem  1,000 mg Intravenous Q8H    vancomycin  1,000 mg Intravenous Q12H    cloNIDine  0.1 mg Oral TID    sodium chloride flush  5-40 mL Intravenous 2 times per day    enoxaparin  40 mg Subcutaneous Daily    sodium chloride flush  5-40 mL Intravenous 2 times per day    insulin lispro  0-6 Units Subcutaneous TID WC    insulin lispro  0-3 Units Subcutaneous Nightly    sertraline  100 mg Oral Daily    aspirin  81 mg Oral Daily    folic acid  1 mg Oral Daily    amLODIPine  5 mg Oral Daily    atorvastatin  10 mg Oral Nightly    levETIRAcetam  750 mg Oral BID    pantoprazole  40 mg Oral QAM AC    lisinopril  40 mg Oral Daily    vancomycin (VANCOCIN) intermittent dosing (placeholder)   Other RX Placeholder     PRN Meds: potassium chloride **OR** potassium alternative oral replacement **OR** potassium chloride, magnesium sulfate, sodium chloride flush, sodium chloride, acetaminophen **OR** acetaminophen, sodium chloride flush, sodium chloride, glucose, dextrose, glucagon (rDNA), dextrose    Intake/Output Summary (Last 24 hours) at 4/11/2021 1600  Last data filed at 4/11/2021 1252  Gross per 24 hour   Intake 1130 ml   Output 600 ml   Net 530 ml     Exam:  /80   Pulse 69   Temp 97.5 °F (36.4 °C) (Oral)   Resp 18   Ht 5' 10\" (1.778 m)   Wt 118 lb (53.5 kg)   SpO2 99%   BMI 16.93 kg/m²   Head: Normocephalic, atraumatic  Sclera clear  Neck JVD flat  Lungs: charis effort of breathing    Labs:   Recent Labs     04/09/21  0600 04/10/21  0600 04/11/21  0538   WBC 10.0 13.0* 11.3* HGB 9.9* 10.9* 10.0*   HCT 29.6* 32.8* 29.7*    341 314     Recent Labs     04/09/21  0600 04/10/21  0600 04/11/21  0536    142 140   K 3.2* 2.7* 3.3*    104 103   CO2 25 28 28   BUN 19 17 16   CREATININE 0.40* 0.37* 0.33*   CALCIUM 8.3* 8.9 8.4*     No results for input(s): INR in the last 72 hours. No results for input(s): Rosmery Hind in the last 72 hours. Radiology:  IR PICC WO SQ PORT/PUMP > 5 YEARS   Final Result      XR SACRUM COCCYX (MIN 2 VIEWS)   Final Result      NO RADIOGRAPHIC EVIDENCE OF OSTEOMYELITIS OR FRACTURE. Assessment/Plan:    Sacral decubitus infection with Pseudomonas, following with infectious disease. PICC line for IV antibiotics.     Hypertension: Monitor blood pressure, titrate meds as needed  Diabetes: Monitor glucose accordion, on insulin sliding scale  Seizure history: Continue home medications  MRDD history noted    Disposition: Awaiting placement    35 minutes total care time, >1/2 in unit/floor time and care coordination     Donna Saab MD

## 2021-04-12 VITALS
OXYGEN SATURATION: 100 % | SYSTOLIC BLOOD PRESSURE: 129 MMHG | HEIGHT: 70 IN | DIASTOLIC BLOOD PRESSURE: 99 MMHG | WEIGHT: 118 LBS | BODY MASS INDEX: 16.89 KG/M2 | RESPIRATION RATE: 12 BRPM | TEMPERATURE: 97.5 F | HEART RATE: 78 BPM

## 2021-04-12 LAB
ANION GAP SERPL CALCULATED.3IONS-SCNC: 10 MEQ/L (ref 9–15)
BUN BLDV-MCNC: 24 MG/DL (ref 8–23)
CALCIUM SERPL-MCNC: 8.2 MG/DL (ref 8.5–9.9)
CHLORIDE BLD-SCNC: 101 MEQ/L (ref 95–107)
CO2: 26 MEQ/L (ref 20–31)
CREAT SERPL-MCNC: 0.39 MG/DL (ref 0.7–1.2)
GFR AFRICAN AMERICAN: >60
GFR NON-AFRICAN AMERICAN: >60
GLUCOSE BLD-MCNC: 203 MG/DL (ref 60–115)
GLUCOSE BLD-MCNC: 205 MG/DL (ref 70–99)
GLUCOSE BLD-MCNC: 286 MG/DL (ref 60–115)
GLUCOSE BLD-MCNC: 363 MG/DL (ref 60–115)
HCT VFR BLD CALC: 32.1 % (ref 42–52)
HEMOGLOBIN: 10.8 G/DL (ref 14–18)
MCH RBC QN AUTO: 30.3 PG (ref 27–31.3)
MCHC RBC AUTO-ENTMCNC: 33.7 % (ref 33–37)
MCV RBC AUTO: 89.8 FL (ref 80–100)
PDW BLD-RTO: 16.7 % (ref 11.5–14.5)
PERFORMED ON: ABNORMAL
PLATELET # BLD: 338 K/UL (ref 130–400)
POTASSIUM REFLEX MAGNESIUM: 3.6 MEQ/L (ref 3.4–4.9)
RBC # BLD: 3.58 M/UL (ref 4.7–6.1)
SARS-COV-2, NAAT: NOT DETECTED
SODIUM BLD-SCNC: 137 MEQ/L (ref 135–144)
WBC # BLD: 13.4 K/UL (ref 4.8–10.8)

## 2021-04-12 PROCEDURE — 6360000002 HC RX W HCPCS: Performed by: INTERNAL MEDICINE

## 2021-04-12 PROCEDURE — 2580000003 HC RX 258: Performed by: INTERNAL MEDICINE

## 2021-04-12 PROCEDURE — 6370000000 HC RX 637 (ALT 250 FOR IP): Performed by: INTERNAL MEDICINE

## 2021-04-12 PROCEDURE — 87635 SARS-COV-2 COVID-19 AMP PRB: CPT

## 2021-04-12 PROCEDURE — 85027 COMPLETE CBC AUTOMATED: CPT

## 2021-04-12 PROCEDURE — 99212 OFFICE O/P EST SF 10 MIN: CPT

## 2021-04-12 PROCEDURE — 11042 DBRDMT SUBQ TIS 1ST 20SQCM/<: CPT | Performed by: NURSE PRACTITIONER

## 2021-04-12 PROCEDURE — 80048 BASIC METABOLIC PNL TOTAL CA: CPT

## 2021-04-12 PROCEDURE — 99232 SBSQ HOSP IP/OBS MODERATE 35: CPT | Performed by: INTERNAL MEDICINE

## 2021-04-12 RX ADMIN — ASPIRIN 81 MG: 81 TABLET, COATED ORAL at 09:08

## 2021-04-12 RX ADMIN — SERTRALINE HYDROCHLORIDE 100 MG: 50 TABLET ORAL at 09:08

## 2021-04-12 RX ADMIN — INSULIN LISPRO 5 UNITS: 100 INJECTION, SOLUTION INTRAVENOUS; SUBCUTANEOUS at 17:15

## 2021-04-12 RX ADMIN — FOLIC ACID 1 MG: 1 TABLET ORAL at 09:10

## 2021-04-12 RX ADMIN — CLONIDINE HYDROCHLORIDE 0.1 MG: 0.1 TABLET ORAL at 17:19

## 2021-04-12 RX ADMIN — MEROPENEM 1000 MG: 1 INJECTION, POWDER, FOR SOLUTION INTRAVENOUS at 06:41

## 2021-04-12 RX ADMIN — ENOXAPARIN SODIUM 40 MG: 40 INJECTION SUBCUTANEOUS at 09:07

## 2021-04-12 RX ADMIN — PANTOPRAZOLE SODIUM 40 MG: 40 TABLET, DELAYED RELEASE ORAL at 06:41

## 2021-04-12 RX ADMIN — MEROPENEM 1000 MG: 1 INJECTION, POWDER, FOR SOLUTION INTRAVENOUS at 17:17

## 2021-04-12 RX ADMIN — LISINOPRIL 40 MG: 20 TABLET ORAL at 09:08

## 2021-04-12 RX ADMIN — CLONIDINE HYDROCHLORIDE 0.1 MG: 0.1 TABLET ORAL at 09:08

## 2021-04-12 RX ADMIN — AMLODIPINE BESYLATE 5 MG: 5 TABLET ORAL at 09:08

## 2021-04-12 RX ADMIN — LEVETIRACETAM 750 MG: 500 TABLET ORAL at 09:08

## 2021-04-12 RX ADMIN — INSULIN LISPRO 2 UNITS: 100 INJECTION, SOLUTION INTRAVENOUS; SUBCUTANEOUS at 09:29

## 2021-04-12 ASSESSMENT — PAIN SCALES - GENERAL: PAINLEVEL_OUTOF10: 0

## 2021-04-12 NOTE — CARE COORDINATION
LSW spoke with guardian Norris Wilkins who confirmed DC to [facility] who can accept. LOC received. Transport to [place] for 5 pm.  Notified [facility] nurse Estiven Hopper. Phone call to [facility] to notify.

## 2021-04-12 NOTE — FLOWSHEET NOTE
Handoff of care to Johnie Castro - supervisor at [facility].  Electronically signed by Whit Payan RN on 4/12/2021 at 4:16 PM

## 2021-04-12 NOTE — PROGRESS NOTES
aZina Dickerson 39                                                   Consult Note      [facility] RECORD NUMBER:  [id number]  AGE: 78 y.o. GENDER: male  : 1950  EPISODE DATE:  2021    Subjective:     Chief Complaint   Patient presents with    Wound Infection     pt was sent to the from ID for PICC line placement and IV antibiotics          HISTORY of PRESENT ILLNESS HPI     Judie Tarango is a 78 y.o. male who presents today for wound/ulcer evaluation. History of Wound Context: Patient known to wound center- currently being followed for non-healing sacral wound with recent onset of thigh/buttock wound. At last appointment wound was covered with eschar. Debrided outer aspect and began application of santyl (collagenase) to soften eschar to allow more in depth debridement. Wound cultured at that time, returned positive for Proteus mirabilis and Pseudomonas aeruginosa. Started oral augmentin to cover the Proteus, however, the Pseudomonas not sensitive to any oral antibiotics. Referred to Dr. Walter Tena. Dr. Walter Tena evaluated patient via virtual visit on 21 and requested following his 21 appointment, the patient be sent to ER to facilitate admit to insert PICC line. The buttock wound was debrided in the [facility] before sending to ER. Today, the wound bed remains mostly granular with small amount of slough at outer edge of wound- will re-debride. The sacral wound is primarily granular with some soft yellow slough covering. Both wounds debrided today prior to transfer to [facility] for IV antibiotics/skilled care. Wound/Ulcer Pain Timing/Severity: intermittent  Quality of pain: tender  Severity:  2 / 10   Modifying Factors: Pain worsens with care and subsides following completion of.    Associated Signs/Symptoms: drainage and odor    Ulcer Identification:  Ulcer Type: pressure  Contributing Factors: chronic pressure, decreased mobility, shear force and incontinence of stool    Wound: N/A        PAST MEDICAL HISTORY        Diagnosis Date    Anemia     Chronic indwelling Ha catheter     Chronic UTI     Chronic UTI     Dermatitis     DM (diabetes mellitus) ([facility]     DM type 2 (diabetes mellitus, type 2) ([facility] 10/29/2013    Eczema     Gastric polyp     GERD (gastroesophageal reflux disease)     H/O bone density study 9.2.11    lumbar spine, lt femoral neck osteopenia, rt femoral neck normal    High blood pressure     HTN (hypertension)     Hypokalemia     Hypovitaminosis D     VANIA (iron deficiency anemia)     MR (mental retardation)     Neurogenic dysfunction of the urinary bladder 1/22/2019    Osteopenia     Pressure ulcer of [facility]geal region, un[facility]able [facility] 09/17/2020    Seizure disorder ([facility]     Wound of back        PAST SURGICAL HISTORY    Past Surgical History:   Procedure Laterality Date    COLONOSCOPY  8.19.11    negative    UPPER GASTROINTESTINAL ENDOSCOPY  8.19.11    by Dr. Chris Jackson N/A 11/7/2019    EGD performed by Kael Gunderson MD at [address] [place] [address] Rd    History reviewed. No pertinent family history. SOCIAL HISTORY    Social History     Tobacco Use    Smoking status: Never Smoker    Smokeless tobacco: Never Used   Substance Use Topics    Alcohol use: No    Drug use: No       ALLERGIES    No Known Allergies    MEDICATIONS    No current facility-administered medications on file prior to encounter.       Current Outpatient Medications on File Prior to Encounter   Medication Sig Dispense Refill    docusate sodium (COLACE) 100 MG capsule Take 100 mg by mouth as needed for Constipation      sertraline (ZOLOFT) 100 MG tablet TAKE ONE (1) TABLET BY MOUTH ONCE (1) DAILY IN THE MORNING (ANTI-DEPRESSANT) 90 tablet 0    loratadine (CLARITIN) 10 MG tablet TAKE ONE (1) TABLET BY MOUTH ONCE (1) DAILY (PRURITIC DERMATITUS) 90 tablet 0    vitamin B-12 (CYANOCOBALAMIN) 1000 MCG tablet Take 1,000 mcg by mouth daily  JANUVIA 100 MG tablet TAKE ONE (1) TABLET BY MOUTH ONCE (1) DAILY 30 tablet 0    loperamide (IMODIUM) 2 MG capsule Take 2 mg by mouth 4 times daily as needed for Diarrhea      Lancet Device MISC 1 Device by Does not apply route once for 1 dose 1 Device 0    ammonium lactate (LAC-HYDRIN) 12 % lotion Apply topically as needed for Dry Skin Apply topically as needed.  aspirin 81 MG chewable tablet Take 81 mg by mouth daily      folic acid (FOLVITE) 1 MG tablet Take 1 mg by mouth daily      [facility]. Devices [facility]) MISC Manual wheel chair with foot petals  A50.49 1 each 0    Elastic Bandages & Supports (T.E.D. KNEE LENGTH/M-REGULAR) MISC 1 set of MINERVA stockings. Apply daily while upright in wheelchair as needed for dependent edema control.   Pharmacy/ DME may adjust size as appropriate with corrected measurement 1 each 0    amLODIPine (NORVASC) 5 MG tablet Take 5 mg by mouth daily      acetaminophen (TYLENOL) 325 MG tablet Take 325 mg by mouth daily      atorvastatin (LIPITOR) 10 MG tablet Take 10 mg by mouth daily      busPIRone (BUSPAR) 15 MG tablet Take 15 mg by mouth 3 times daily      cloNIDine (CATAPRES) 0.1 MG tablet Take 0.1 mg by mouth 3 times daily      Cranberry 500 MG CAPS Take 500 mg by mouth 2 times daily      ferrous sulfate 325 (65 Fe) MG tablet Take 325 mg by mouth 2 times daily      levETIRAcetam (KEPPRA) 750 MG tablet Take 750 mg by mouth 2 times daily      lisinopril (PRINIVIL;ZESTRIL) 40 MG tablet Take 40 mg by mouth 2 times daily      metFORMIN (GLUCOPHAGE) 500 MG tablet Take 1,000 mg by mouth 2 times daily (with meals)       Multiple Vitamin (MULTIVITAMINS PO) Take by mouth daily      calcium carbonate (OSCAL) 500 MG TABS tablet Take 500 mg by mouth 2 times daily      pantoprazole (PROTONIX) 40 MG tablet Take 40 mg by mouth daily      pioglitazone (ACTOS) 30 MG tablet Take 45 mg by mouth daily       Pyridoxine HCl (VITAMIN B-6) 50 MG tablet Take 25 mg by mouth daily  Cholecalciferol (VITAMIN D3) 2000 units CAPS Take by mouth Daily with lunch      ondansetron (ZOFRAN) 4 MG tablet Take 4 mg by mouth every 6 hours as needed for Nausea or Vomiting      promethazine (PHENERGAN) 25 MG suppository Place 25 mg rectally every 6 hours as needed for Nausea      Na Sulfate-K Sulfate-Mg Sulf 17.5-3.13-1.6 GM/177ML SOLN As directed 1 Bottle 0       REVIEW OF SYSTEMS    Pertinent items are noted in HPI. Objective:      BP (!) 129/99   Pulse 78   Temp 97.5 °F (36.4 °C) (Oral)   Resp 12   Ht 5' 10\" (1.778 m)   Wt 118 lb (53.5 kg)   SpO2 100%   BMI 16.93 kg/m²     Wt Readings from Last 3 Encounters:   04/08/21 118 lb (53.5 kg)   03/06/21 117 lb 15.1 oz (53.5 kg)   09/11/20 155 lb (70.3 kg)       PHYSICAL EXAM    Constitutional:   Well nourished and well developed. Appears neat and clean. Patient is alert, and in no apparent distress. Patient is normally non-verbal, makes eye contact, and rare one word responses such as Yes. Respiratory:  Respiratory effort is easy and symmetric bilaterally. Rate is normal at rest and on room air. Vascular:    Extremities positive for 2+ pitting edema. Right hand noted to be edematous and cool. Neurological:   Unable to accurately evaluate due to non-verbal status. Dermatological:  Wound description noted in wound assessment. The wound sacral wound is primarily granular, with some slough in wound bed- debrided. The left buttock/thigh wound is red/granular with small area of stringy gray slough remaining, debrided. Psychiatric:    Patient is calm, cooperative, and communicative. Appropriate interactions and affect.         Assessment:      Problem List Items Addressed This Visit     None      Visit Diagnoses     Pressure injury of sacral region, unstageable ([address]    -  Primary    Wound infection        Urinary tract infection associated with indwelling urethral catheter, initial encounter [facility])               Procedure Note  Indications:  Based on my examination of this patient's wound(s)/ulcer(s) today, debridement is required to promote healing and evaluate the wound base. Performed by: WILI Martin NP    Consent obtained:  Yes    Time out taken:  Yes    Pain Control:   none required. Debridement:Excisional Debridement    Using curette, scissors and forceps the wound(s)/ulcer(s) was/were sharply debrided down through and including the removal of epidermis, dermis and subcutaneous tissue. Devitalized Tissue Debrided:  fibrin, biofilm, slough and necrotic/eschar    Pre Debridement Measurements:  Are located in the [facility]  Documentation Flow Sheet    Wound/Ulcer #: 1 and 2    Post Debridement Measurements:  Wound/Ulcer Descriptions are Pre Debridement except measurements:    Wound 09/17/20 Coccyx #1 (Active)   Wound Image   04/08/21 1022   Wound Etiology Pressure Stage  3 04/12/21 0748   Dressing Status Clean;Dry; Intact 04/10/21 2200   Wound Cleansed Cleansed with saline 04/10/21 0100   Dressing/Treatment Dry dressing 04/10/21 2200   Wound Length (cm) 2.5 cm 04/08/21 1022   Wound Width (cm) 2 cm 04/08/21 1022   Wound Depth (cm) 0.8 cm 04/08/21 1022   Wound Surface Area (cm^2) 5 cm^2 04/08/21 1022   Change in Wound Size % (l*w) 75 04/08/21 1022   Wound Volume (cm^3) 4 cm^3 04/08/21 1022   Wound Healing % 0 04/08/21 1022   Post-Procedure Length (cm) 2.5 cm 04/08/21 1034   Post-Procedure Width (cm) 2 cm 04/08/21 1034   Post-Procedure Depth (cm) 0.8 cm 04/08/21 1034   Post-Procedure Surface Area (cm^2) 5 cm^2 04/08/21 1034   Post-Procedure Volume (cm^3) 4 cm^3 04/08/21 1034   Undermining Starts ___ O'Clock 4 04/08/21 1022   Undermining Ends___ O'Clock 12 04/08/21 1022   Undermining Maxium Distance (cm) 0.4 04/08/21 1022   Wound Assessment Granulation tissue;Slough 04/08/21 1022   Drainage Amount Moderate 04/10/21 2200   Drainage Description Serous; Yellow;Serosanguinous 04/10/21 2200   Odor Mild 04/10/21

## 2021-04-12 NOTE — PROGRESS NOTES
Infectious Disease     Patient Name: Dinesh Kenney  Date: 4/12/2021  YOB: 1950  Medical Record Number: [id number]      Infected decubital ulcerations  Osteomyelitis pelvis  UTI with Pseudomonas        Patient presents longstanding decubital ulceration  On oral antibiotics        Review of Systems   Unable to perform ROS: Patient nonverbal            Physical Exam   Cardiovascular:   No murmur heard. Pulmonary/Chest: Breath sounds normal. No respiratory distress. He has no wheezes. He has no rales. Abdominal: He exhibits no distension and no mass. There is no abdominal tenderness. There is no rebound. Skin:            Blood pressure (!) 129/99, pulse 78, temperature 97.5 °F (36.4 °C), resp. rate 18, height 5' 10\" (1.778 m), weight 118 lb (53.5 kg), SpO2 100 %.       .   Lab Results   Component Value Date    WBC 13.4 (H) 04/12/2021    HGB 10.8 (L) 04/12/2021    HCT 32.1 (L) 04/12/2021    MCV 89.8 04/12/2021     04/12/2021     Lab Results   Component Value Date     04/12/2021    K 3.6 04/12/2021     04/12/2021    CO2 26 04/12/2021    BUN 24 04/12/2021    CREATININE 0.39 04/12/2021    GLUCOSE 205 04/12/2021    GLUCOSE 89 06/01/2012    CALCIUM 8.2 04/12/2021         Microscopic Urinalysis [[id number]] (Abnormal) Collected: 04/08/21 1200      Updated: 04/08/21 1711      Bacteria, UA MANY /HPF       WBC, UA >100 /HPF       Epithelial Cells, UA 6-10 /HPF        Component Collected Lab   Organism Abnormal  04/08/2021 12:00 PM [address] [facility]   Pseudomonas aeruginosa    Urine Culture, Routine 04/08/2021 12:00 PM [address] [facility]   50,000 CFU/ml    Organism Abnormal  04/08/2021 12:00 PM [address] [facility]   Providencia stuartii    Urine Culture, Routine 04/08/2021 12:00 PM [address] [facility]   >100,000 CFU/ml    Testing Performed By    Lab - [address] [place] Name Director Address Valid Date Range   [address] [place] [address] Paresh Roldan MD [address] [facility] [address] 07/12/18 0959-Present   Narrative  Performed by: Syd HOUSER [facility]  ORDER#: [id number]                          ORDERED BY: KANG MEEHAN   SOURCE: Urine Clean Catch                  COLLECTED:  04/08/21 12:00   ANTIBIOTICS AT BERE. :                      RECEIVED :  04/08/21 13:13   Susceptibility    Pseudomonas aeruginosa (1)    Antibiotic Interpretation KRISTOPHER Status    amikacin Resistant >=64 mcg/mL     cefepime Intermediate 8 mcg/mL     ciprofloxacin Sensitive 0.5 mcg/mL     gentamicin Resistant >=16 mcg/mL     imipenem Sensitive 2 mcg/mL     piperacillin-tazobactam Resistant >=128 mcg/mL     tobramycin Intermediate 8 mcg/mL     Providencia stuartii (2)    Antibiotic Interpretation KRISTOPHER Status    ampicillin Resistant >=32 mcg/mL     ceFAZolin Resistant >=64 mcg/mL     cefepime Sensitive <=1 mcg/mL     cefTRIAXone Sensitive 8 mcg/mL     ciprofloxacin Resistant >=4 mcg/mL     gentamicin Resistant 8 mcg/mL     imipenem Sensitive 1 mcg/mL     levofloxacin Resistant >=8 mcg/mL     nitrofurantoin Resistant 128 mcg/mL     tobramycin Resistant 8 mcg/mL     trimethoprim-sulfamethoxazole Sensitive <=20 mcg/mL               PLAN:    Infected decubital ulcerations  Osteomyelitis pelvs  UTI with Pseudomonas    vanco meropenem  Extended course of therapy 6 to 8 weeks

## 2021-04-12 NOTE — PROGRESS NOTES
Hospitalist Progress Note      Date of Admission: 4/8/2021  Chief Complaint:    Chief Complaint   Patient presents with    Wound Infection     pt was sent to the from ID for PICC line placement and IV antibiotics      Subjective:  No new complaints.   No nausea, vomiting, chest pain, or headache      Medications:    Infusion Medications    sodium chloride      sodium chloride      dextrose       Scheduled Medications    meropenem  1,000 mg Intravenous Q8H    cloNIDine  0.1 mg Oral TID    sodium chloride flush  5-40 mL Intravenous 2 times per day    enoxaparin  40 mg Subcutaneous Daily    sodium chloride flush  5-40 mL Intravenous 2 times per day    insulin lispro  0-6 Units Subcutaneous TID WC    insulin lispro  0-3 Units Subcutaneous Nightly    sertraline  100 mg Oral Daily    aspirin  81 mg Oral Daily    folic acid  1 mg Oral Daily    amLODIPine  5 mg Oral Daily    atorvastatin  10 mg Oral Nightly    levETIRAcetam  750 mg Oral BID    pantoprazole  40 mg Oral QAM AC    lisinopril  40 mg Oral Daily    vancomycin (VANCOCIN) intermittent dosing (placeholder)   Other RX Placeholder     PRN Meds: potassium chloride **OR** potassium alternative oral replacement **OR** potassium chloride, magnesium sulfate, sodium chloride flush, sodium chloride, acetaminophen **OR** acetaminophen, sodium chloride flush, sodium chloride, glucose, dextrose, glucagon (rDNA), dextrose    Intake/Output Summary (Last 24 hours) at 4/12/2021 1707  Last data filed at 4/12/2021 2018  Gross per 24 hour   Intake 650 ml   Output 900 ml   Net -250 ml     Exam:  BP (!) 129/99   Pulse 78   Temp 97.5 °F (36.4 °C) (Oral)   Resp 12   Ht 5' 10\" (1.778 m)   Wt 118 lb (53.5 kg)   SpO2 100%   BMI 16.93 kg/m²   Head: Normocephalic, atraumatic  Sclera clear  Neck JVD flat  Lungs: charis effort of breathing    Labs:   Recent Labs     04/10/21  0600 04/11/21  0538 04/12/21  0600   WBC 13.0* 11.3* 13.4*   HGB 10.9* 10.0* 10.8*   HCT 32.8* 29.7* 32.1*    314 338     Recent Labs     04/10/21  0600 04/11/21  0536 04/12/21  0600    140 137   K 2.7* 3.3* 3.6    103 101   CO2 28 28 26   BUN 17 16 24*   CREATININE 0.37* 0.33* 0.39*   CALCIUM 8.9 8.4* 8.2*     No results for input(s): INR in the last 72 hours. No results for input(s): Nelda Tatamy in the last 72 hours. Radiology:  IR PICC WO SQ PORT/PUMP > 5 YEARS   Final Result      XR SACRUM COCCYX (MIN 2 VIEWS)   Final Result      NO RADIOGRAPHIC EVIDENCE OF OSTEOMYELITIS OR FRACTURE. Assessment/Plan:    Sacral decubitus infection with Pseudomonas, following with infectious disease. PICC line for IV antibiotics.     Hypertension: Monitor blood pressure, titrate meds as needed  Diabetes: Monitor glucose accordion, on insulin sliding scale  Seizure history: Continue home medications  MRDD history noted    Disposition: Awaiting placement    25 minutes total care time, >1/2 in unit/floor time and care coordination     Heike Stuart MD

## 2021-04-12 NOTE — PROGRESS NOTES
Comprehensive Nutrition Assessment    Type and Reason for Visit:  Reassess    Nutrition Recommendations/Plan:    Continue Current plan of care  Assist feed  DIET CARB CONTROL; Carb Control: 4 carb choices (60 gms)/meal; Dysphagia Soft and Bite-Sized  Dietary Nutrition Supplements: Diabetic Oral Supplement @ all meals  Dietary Nutrition Supplements: Wound Healing Oral Supplement @ B& D    Nutrition Assessment:  Severe malnutition continues related to chronic disease ( DM , MRDD0 with chronic wounds.  Current plan of care apporpiate    Malnutrition Assessment:  Malnutrition Status:  Severe malnutrition    Context:  Chronic Illness     Findings of the 6 clinical characteristics of malnutrition:  Energy Intake:  Unable to assess  Weight Loss:  No significant weight loss     Body Fat Loss:  7 - Severe body fat loss Triceps, Fat Overlying Ribs   Muscle Mass Loss:  7 - Severe muscle mass loss Temples (temporalis), Clavicles (pectoralis & deltoids), Thigh (quadraceps)  Fluid Accumulation:  No significant fluid accumulation     Strength:  Not Performed    Estimated Daily Nutrient Needs:  Energy (kcal):  4655-4787 kcals @ 30-35 kcal/ kg; Weight Used for Energy Requirements:  Current(54 kg)     Protein (g):  ~ 108 g protein @ 2 g/ kg BW; Weight Used for Protein Requirements:  Current(54 kg)        Fluid (ml/day):  ~1800; Method Used for Fluid Requirements:  1 ml/kcal      Nutrition Related Findings:  Nonverbal, assist feed, eats well per staff, taking ONS when offered, Anticipated BMI much lower as pt continues to have significant edema, Awaiting d/c back to NH      Wounds:  Multiple, Pressure Injury, Stage III, Unstageable, Wound Consult Pending(See wound care flowsheet)       Current Nutrition Therapies:    DIET CARB CONTROL; Carb Control: 4 carb choices (60 gms)/meal; Dysphagia Soft and Bite-Sized  Dietary Nutrition Supplements: Diabetic Oral Supplement x3   Dietary Nutrition Supplements: Wound Healing Oral Supplement B & D  Anthropometric Measures:  · Height: 5' 10\" (177.8 cm)  · Current Body Weight: 118 lb (53.5 kg)   · Admission Body Weight: 118 lb (53.5 kg)    · Usual Body Weight: 117 lb (53.1 kg)(3/3/21.  Limted acutal wt in EMR)     · Ideal Body Weight: 166 lbs    · BMI: 16.9  · BMI Categories: Underweight (BMI less than 22) age over 72       Nutrition Diagnosis:   · Severe malnutrition, In context of chronic illness related to increase demand for energy/nutrients, endocrine dysfuntion as evidenced by severe muscle loss, severe loss of subcutaneous fat, BMI    · Increased nutrient needs related to increase demand for energy/nutrients as evidenced by wounds    Nutrition Interventions:   Food and/or Nutrient Delivery:  Continue Oral Nutrition Supplement, Continue Current Diet  Nutrition Education/Counseling:  Education not indicated   Coordination of Nutrition Care:  No recommendation at this time, Continue to monitor while inpatient    Goals:  po > 75% meals and supplements to support wound helaing, gluc < 200, anticipated weight loss as edema resovles       Nutrition Monitoring and Evaluation:     Food/Nutrient Intake Outcomes:  Food and Nutrient Intake, Supplement Intake  Physical Signs/Symptoms Outcomes:  Skin, Weight, Meal Time Behavior, Biochemical Data     Discharge Planning:    Continue Oral Nutrition Supplement     Electronically signed by Inez Spatz, RD, LD on 4/12/21 at 1:37 PM EDT

## 2021-04-12 NOTE — PROGRESS NOTES
Vancomycin Day: 5    Patient's labs, cultures, vitals, and vancomycin regimen reviewed. Also receiving Meropenem 1 gm IV q8h based on Urine C/S showing P. aeruginosa Resistant to Zosyn. Anticipated duration of IV Abx 4-6 wk. Recent Labs     04/11/21  0536 04/11/21  0538 04/12/21  0600   CREATININE 0.33*  --  0.39*   WBC  --  11.3* 13.4*     Moved trough to 24h post last dose; discontinued order for 1gm IV q12h.   If trough less than 17, consider 1 gm IV x 1 with random level 18 hrs post.    Myla Castillo, PharmD 4/12/2021 9:27 AM

## 2021-04-12 NOTE — PLAN OF CARE
Nutrition Problem #1: Severe malnutrition, In context of chronic illness  Intervention: Food and/or Nutrient Delivery: Continue Oral Nutrition Supplement, Continue Current Diet  Nutritional Goals: po > 75% meals and supplements to support wound helaing, gluc < 200, anticipated weight loss as edema resovles

## 2021-04-12 NOTE — PROGRESS NOTES
Pt resting in bed on specialty air mattress. Turned and repositioned frequently. Inc of stool. Dressing to buttocks CDI. Ha draining cynthia urine. Antibiotics given per order. Hourly rounds continue. Call light in reach.

## 2021-04-12 NOTE — PROGRESS NOTES
Wound Ostomy Continence Nursing    This [id number]  Nurse consulted for evaluation of chronic pressure injuries to the Left thigh and left buttocks. Patient followed by Nicolás Burr NP in [facility], who is also consulted this admission. Generally surgery and ID are also on the case. No dressing changes ordered yet, so plurogel dressing change daily ordered, per Regimen order by Nicolás Burr NP in wound center. Patient also having incontinence of stool, protective barrier cream with zinc ordered at this time. Measurements and photos in [address] [place] [address] are up-to-date from admission date, and pressure injury prevention orders are in place. No other needs from this [address]  Nurse at this time.     Electronically signed by JONNA Polk, RN, Lito Elder on 4/12/2021 at 9:38 AM

## 2021-04-13 LAB
BLOOD CULTURE, ROUTINE: NORMAL
CULTURE, BLOOD 2: NORMAL

## 2021-04-14 NOTE — PROGRESS NOTES
(temporalis), Clavicles (pectoralis & deltoids),   Thigh (quadraceps)  Treatment: Dietician consult with recommendations    If you have any questions, please feel free to contact me at [phone]. Thank you,  Thor Howe RN BSN CDS  Options provided:  -- Protein calorie malnutrition severe with BMI 16.9  -- Other - I will add my own diagnosis  -- Disagree - Not applicable / Not valid  -- Disagree - Clinically unable to determine / Unknown  -- Refer to Clinical Documentation Reviewer    PROVIDER RESPONSE TEXT:    This patient has severe protein calorie malnutrition with BMI 16.9.     Query created by: Art Scott on 4/12/2021 12:10 PM      Electronically signed by:  Donna Saab MD 4/13/2021 10:24 PM [id number]

## 2021-04-15 LAB — VANCOMYCIN TROUGH: 23.5 UG/ML (ref 10–20)

## 2021-04-16 ENCOUNTER — TELEPHONE (OUTPATIENT)
Dept: INFECTIOUS DISEASES | Age: 71
End: 2021-04-16

## 2021-04-16 NOTE — TELEPHONE ENCOUNTER
Per consult with Dr Baylee Murphy, New Orders are to change the IV Vanco to 1-G, Q-24 hours. Draw next Vanco trough with weekly Labs and than continue weekly, until further notice. Return call to [facility], no one available to take the Orders, Faxed update to [phone], [facility].

## 2021-04-22 LAB — VANCOMYCIN TROUGH: 22 UG/ML (ref 10–20)

## 2021-05-12 NOTE — DISCHARGE SUMMARY
Hospital Medicine Discharge Summary    Teena Toledo  :  1950  MRN:  [id number]    Admit date:  2021  Discharge date: 2021    Admitting Physician:  Tom Gonsales DO  Primary Care Physician:  Mani Duron MD    Teena Toledo is a 78 y.o. male that was admitted and treated at [facility] for the following medical issues: Active Problems:    Disorder of intellectual development    Diabetes mellitus due to underlying condition with stage 1 chronic kidney disease, without long-term current use of insulin (HCC)    Chronic ulcer of sacral region, with unspecified severity (HCC)    Wound, open, hip or thigh with complication    Buttock wound, left, subsequent encounter    Sacral decubitus ulcer    Severe malnutrition ([address]  Resolved Problems:    * No resolved hospital problems. *      Discharge Diagnoses: Active Problems:    Disorder of intellectual development    Diabetes mellitus due to underlying condition with stage 1 chronic kidney disease, without long-term current use of insulin (HCC)    Chronic ulcer of sacral region, with unspecified severity (HCC)    Wound, open, hip or thigh with complication    Buttock wound, left, subsequent encounter    Sacral decubitus ulcer    Severe malnutrition ([address]  Resolved Problems:    * No resolved hospital problems. *    Chief Complaint   Patient presents with    Wound Infection     pt was sent to the from ID for PICC line placement and IV antibiotics        Hospital Course:   Teena Toledo is a 78 y.o. male who was admitted with  Sacral decubitus infection with Pseudomonas, following with infectious disease. PICC line for IV antibiotics.     Hypertension: Monitor blood pressure, titrate meds as needed  Diabetes: Monitor glucose accordion, on insulin sliding scale  Seizure history: Continue home medications  MRDD history noted  Pt was discharge in a stable condition.        BP (!) 129/99   Pulse 78   Temp 97.5 °F (36.4 °C) (Oral) Resp 12   Ht 5' 10\" (1.778 m)   Wt 118 lb (53.5 kg)   SpO2 100%   BMI 16.93 kg/m²     Patient was seen by the following consultants while admitted to [facility]:   Consults:  [address]  IP CONSULT TO INFECTIOUS DISEASES  IP CONSULT TO PODIATRY  IP CONSULT TO WOUND CARE PROVIDER  PHARMACY TO DOSE VANCOMYCIN    Significant Diagnostic Studies:    Refer to chart if  Xr Sacrum Coccyx (min 2 Views)    Result Date: 4/9/2021  XR SACRUM COCCYX (MIN 2 VIEWS) : 4/8/2021 CLINICAL HISTORY:  Decubitus ulcer. R/o osteo . COMPARISON: 9/11/2020. TECHNIQUE: AP and lateral radiographs of the sacrum and coccyx were obtained. FINDINGS: There is no worrisome bone destruction, fracture or significant change from the prior study identified. Chronic remodeling of the right hip presumably from developmental hip dysplasia with advanced degenerative changes are again noted. NO RADIOGRAPHIC EVIDENCE OF OSTEOMYELITIS OR FRACTURE. Ir Picc Wo Sq Port/pump > 5 Years    Result Date: 4/8/2021  PERIPHERALLY INSERTED CENTRAL CATHETER (PICC) PLACEMENT WITH ULTRASOUND GUIDANCE CLINICAL HISTORY:  REQUIRES VENOUS ACCESS FOR IV ANTIBIOTICS. After discussing the procedure and possible complications with the patient, informed consent was obtained. The patient was placed on the Special Procedures table. The right upper extremity was sterilely prepared using a maximal sterile barrier technique which includes cap, mask, sterile gown, sterile gloves, sterile full-body drape, hand hygiene and 2% chlorhexidine for cutaneous antisepsis. A sterile ultrasound technique with sterile gel and sterile probe covers was also utilized. A pre-procedure time out was performed in order to assure the correct patient and procedure. Local anesthetic was administered. A peripheral vein was accessed with sonographic guidance. A sonographic spot image was obtained for documentation.   A guidewire was advanced into the vein control. Pharmacy/ DME may adjust size as appropriate with corrected measurement             ferrous sulfate 325 (65 Fe) MG tablet  Take 325 mg by mouth 2 times daily             folic acid (FOLVITE) 1 MG tablet  Take 1 mg by mouth daily             JANUVIA 100 MG tablet  TAKE ONE (1) TABLET BY MOUTH ONCE (1) DAILY             Lancet Device MISC  1 Device by Does not apply route once for 1 dose             levETIRAcetam (KEPPRA) 750 MG tablet  Take 750 mg by mouth 2 times daily             lisinopril (PRINIVIL;ZESTRIL) 40 MG tablet  Take 40 mg by mouth 2 times daily             loperamide (IMODIUM) 2 MG capsule  Take 2 mg by mouth 4 times daily as needed for Diarrhea             loratadine (CLARITIN) 10 MG tablet  TAKE ONE (1) TABLET BY MOUTH ONCE (1) DAILY (PRURITIC DERMATITUS)             metFORMIN (GLUCOPHAGE) 500 MG tablet  Take 1,000 mg by mouth 2 times daily (with meals)              Mis. Devices [facility]) MISC  Manual wheel chair with foot petals  A50.49             Multiple Vitamin (MULTIVITAMINS PO)  Take by mouth daily             Na Sulfate-K Sulfate-Mg Sulf 17.5-3.13-1.6 GM/177ML SOLN  As directed             ondansetron (ZOFRAN) 4 MG tablet  Take 4 mg by mouth every 6 hours as needed for Nausea or Vomiting             pantoprazole (PROTONIX) 40 MG tablet  Take 40 mg by mouth daily             pioglitazone (ACTOS) 30 MG tablet  Take 45 mg by mouth daily              promethazine (PHENERGAN) 25 MG suppository  Place 25 mg rectally every 6 hours as needed for Nausea             Pyridoxine HCl (VITAMIN B-6) 50 MG tablet  Take 25 mg by mouth daily             sertraline (ZOLOFT) 100 MG tablet  TAKE ONE (1) TABLET BY MOUTH ONCE (1) DAILY IN THE MORNING (ANTI-DEPRESSANT)             vitamin B-12 (CYANOCOBALAMIN) 1000 MCG tablet  Take 1,000 mcg by mouth daily                 Disposition:   If discharged to Home, Any [place] [address] needs that were indicated and/or required as been addressed and set up by Social Work. Condition at discharge: Pt was medically stable at the time of discharge. Activity: activity as tolerated    Total time taken for discharging this patient: 40 minutes. Greater than 70% of time was spent focused exclusively on this patient. Time was taken to review chart, discuss plans with consultants, reconciling medications, discussing plan answering questions with patient.      Signed:  Kendra Arnold

## 2021-07-21 ENCOUNTER — TELEPHONE (OUTPATIENT)
Dept: FAMILY MEDICINE CLINIC | Age: 71
End: 2021-07-21

## 2021-07-21 NOTE — TELEPHONE ENCOUNTER
Jenelle from [place] [address] called checking on status of CMN for re-cert for patient's wheelchair. She said it was faxed on 7/13. Please advise, thank you.

## (undated) DEVICE — CONMED SCOPE SAVER BITE BLOCK, 20X27 MM: Brand: SCOPE SAVER

## (undated) DEVICE — ADAPTER FLSH PMP FLD MGMT GI IRRIG OFP 2 DISPOSABLE

## (undated) DEVICE — SONY PRINTER PAPER

## (undated) DEVICE — TUBE SET 96 MM 64 MM H2O PERISTALTIC STD AUX CHANNEL

## (undated) DEVICE — Device: Brand: ENDO SMARTCAP

## (undated) DEVICE — ENDO CARRY-ON PROCEDURE KIT: Brand: ENDO CARRY-ON PROCEDURE KIT

## (undated) DEVICE — GLOVE ORANGE PI 8 1/2   MSG9085

## (undated) DEVICE — COVER DUST PERIMETER HUMPREY

## (undated) DEVICE — 4-PORT MANIFOLD: Brand: NEPTUNE 2

## (undated) DEVICE — BRUSH ENDO CLN L90.5IN SHTH DIA1.7MM BRIST DIA5-7MM 2-6MM

## (undated) DEVICE — 1200CC COLLECTION UNIT 6MM X 6' PATIENT: Brand: MEDI-VAC